# Patient Record
Sex: FEMALE | Race: WHITE | Employment: FULL TIME | URBAN - METROPOLITAN AREA
[De-identification: names, ages, dates, MRNs, and addresses within clinical notes are randomized per-mention and may not be internally consistent; named-entity substitution may affect disease eponyms.]

---

## 2017-10-02 RX ORDER — BUPROPION HYDROCHLORIDE 300 MG/1
TABLET ORAL
Qty: 90 TAB | Refills: 3 | Status: SHIPPED | OUTPATIENT
Start: 2017-10-02 | End: 2018-11-15 | Stop reason: SDUPTHER

## 2017-10-02 RX ORDER — SPIRONOLACTONE 50 MG/1
TABLET, FILM COATED ORAL
Qty: 90 TAB | Refills: 3 | Status: SHIPPED | OUTPATIENT
Start: 2017-10-02 | End: 2018-11-15 | Stop reason: SDUPTHER

## 2017-10-02 RX ORDER — FAMOTIDINE 40 MG/1
TABLET, FILM COATED ORAL
Qty: 90 TAB | Refills: 3 | Status: SHIPPED | OUTPATIENT
Start: 2017-10-02 | End: 2018-11-15 | Stop reason: SDUPTHER

## 2018-07-24 ENCOUNTER — OFFICE VISIT (OUTPATIENT)
Dept: INTERNAL MEDICINE CLINIC | Age: 48
End: 2018-07-24

## 2018-07-24 VITALS
OXYGEN SATURATION: 97 % | DIASTOLIC BLOOD PRESSURE: 80 MMHG | HEART RATE: 74 BPM | SYSTOLIC BLOOD PRESSURE: 140 MMHG | WEIGHT: 193 LBS | BODY MASS INDEX: 36.44 KG/M2 | HEIGHT: 61 IN

## 2018-07-24 DIAGNOSIS — F32.A DEPRESSION, UNSPECIFIED DEPRESSION TYPE: ICD-10-CM

## 2018-07-24 DIAGNOSIS — I10 ESSENTIAL HYPERTENSION: Primary | ICD-10-CM

## 2018-07-24 PROBLEM — E66.01 SEVERE OBESITY (BMI 35.0-39.9): Status: ACTIVE | Noted: 2018-07-24

## 2018-07-24 RX ORDER — CHOLECALCIFEROL TAB 125 MCG (5000 UNIT) 125 MCG
TAB ORAL DAILY
COMMUNITY
End: 2022-05-26 | Stop reason: SDUPTHER

## 2018-07-24 RX ORDER — LEVOCETIRIZINE DIHYDROCHLORIDE 5 MG/1
TABLET, FILM COATED ORAL
COMMUNITY

## 2018-07-24 NOTE — PROGRESS NOTES
Jaymie Talya presents with   Chief Complaint   Patient presents with    Follow-up     discuss medications   Patient here for an overdue followup & medication evaluation. States she is going through a divorce at this time and is tearful. Depression screening needs to be completed. 1. Have you been to the ER, urgent care clinic since your last visit? Hospitalized since your last visit? No    2. Have you seen or consulted any other health care providers outside of the [address] [place] [address] since your last visit? Include any pap smears or colon screening.  No

## 2018-07-24 NOTE — PATIENT INSTRUCTIONS
Depression and Chronic Disease: Care Instructions  Your Care Instructions    A chronic disease is one that you have for a long time. Some chronic diseases can be controlled, but they usually cannot be cured. Depression is common in people with chronic diseases, but it often goes unnoticed. Many people have concerns about seeking treatment for a mental health problem. You may think it's a sign of weakness, or you don't want people to know about it. It's important to overcome these reasons for not seeking treatment. Treating depression or anxiety is good for your health. Follow-up care is a key part of your treatment and safety. Be sure to make and go to all appointments, and call your doctor if you are having problems. It's also a good idea to know your test results and keep a list of the medicines you take. How can you care for yourself at home? Watch for symptoms of depression  The symptoms of depression are often subtle at first. You may think they are caused by your disease rather than depression. Or you may think it is normal to be depressed when you have a chronic disease. If you are depressed you may:  · Feel sad or hopeless. · Feel guilty or worthless. · Not enjoy the things you used to enjoy. · Feel hopeless, as though life is not worth living. · Have trouble thinking or remembering. · Have low energy, and you may not eat or sleep well. · Pull away from others. · Think often about death or killing yourself. (Keep the numbers for these national suicide hotlines: 8-819-580-TALK [1-811.226.9199] and 1-873-QVDYQGA [1-140.981.7446]. )  Get treatment  By treating your depression, you can feel more hopeful and have more energy. If you feel better, you may take better care of yourself, so your health may improve. · Talk to your doctor if you have any changes in mood during treatment for your disease. · Ask your doctor for help.  Counseling, antidepressant medicine, or a combination of the two can help most people with depression. Often a combination works best. Counseling can also help you cope with having a chronic disease. When should you call for help? Call 911 anytime you think you may need emergency care. For example, call if:    · You feel like hurting yourself or someone else.     · Someone you know has depression and is about to attempt or is attempting suicide.   [facility] your doctor now or seek immediate medical care if:    · You hear voices.     · Someone you know has depression and:  ¨ Starts to give away his or her possessions. ¨ Uses illegal drugs or drinks alcohol heavily. ¨ Talks or writes about death, including writing suicide notes or talking about guns, knives, or pills. ¨ Starts to spend a lot of time alone. ¨ Acts very aggressively or suddenly appears calm.    Watch closely for changes in your health, and be sure to contact your doctor if:    · You do not get better as expected. Where can you learn more? Go to http://anton-esperanza.info/. Enter J230 in the search box to learn more about \"Depression and Chronic Disease: Care Instructions. \"  Current as of: December 7, 2017  Content Version: 11.7  © 2544-6530 [employer]. Care instructions adapted under license by [employer] (which disclaims liability or warranty for this information). If you have questions about a medical condition or this instruction, always ask your healthcare professional. [address] any warranty or liability for your use of this information.

## 2018-07-24 NOTE — MR AVS SNAPSHOT
[address] [facility] 
 
 
 [address] [id number] [phone] Patient: Mustapha Stafford MRN: [id number] [facility]:[birth year] Visit Information Date & Time Provider Department Dept. Phone Encounter #  
 7/24/2018  3:00 PM Manuel Lopez NP 20 [facility] [phone] [id number] Follow-up Instructions Return in about 2 weeks (around 8/7/2018). Your Appointments 8/8/2018  9:30 AM  
Complete Physical with Manuel Lopez NP  
[facility] ([address]) Appt Note: CPE, fasting [address] [address] [address] [address] Upcoming Health Maintenance Date Due DTaP/Tdap/Td series (1 - Tdap) 1/30/1991 PAP AKA CERVICAL CYTOLOGY 1/30/1991 Influenza Age 5 to Adult 8/1/2018 Allergies as of 7/24/2018  Review Complete On: 7/24/2018 By: Manuel Lopez NP Severity Noted Reaction Type Reactions Sulfa Dyne  04/06/2012    Hives, Itching Current Immunizations  Never Reviewed No immunizations on file. Not reviewed this visit Vitals BP Pulse Height(growth percentile) Weight(growth percentile) LMP SpO2  
 140/80 74 5' 1\" (1.549 m) 193 lb (87.5 kg) 11/01/2012 97% BMI OB Status Smoking Status 36.47 kg/m2 Hysterectomy Never Smoker Vitals History BMI and BSA Data Body Mass Index Body Surface Area  
 36.47 kg/m 2 1.94 m 2 Preferred Pharmacy Pharmacy Name Phone Christoph Larios, [facility] [phone] Your Updated Medication List  
  
   
This list is accurate as of 7/24/18  4:05 PM.  Always use your most recent med list.  
  
  
  
  
 buPROPion  mg XL tablet Commonly known as:  WELLBUTRIN XL  
TAKE 1 TABLET DAILY calcium citrate-vitamin d3 315-200 mg-unit Tab Commonly known as:  CITRACAL+D Take 1 Tab by mouth daily (with breakfast). CENTRUM 0.4-162-18 mg Tab Generic drug:  OY-DG-RG-Fe-Min-Lycopen-Lutein Take  by mouth daily. cetirizine 10 mg tablet Commonly known as:  ZYRTEC Take 10 mg by mouth nightly as needed. cholecalciferol (VITAMIN D3) 5,000 unit Tab tablet Commonly known as:  VITAMIN D3 Take  by mouth daily. famotidine 40 mg tablet Commonly known as:  PEPCID  
TAKE 1 TABLET DAILY  
  
 lisinopril 5 mg tablet Commonly known as:  Kristy Vincenzo Take 2.5 mg by mouth daily. NASONEX 50 mcg/actuation nasal spray Generic drug:  mometasone 2 Sprays by Both Nostrils route daily. spironolactone 50 mg tablet Commonly known as:  ALDACTONE  
TAKE 1 TABLET DAILY XYZAL 5 mg tablet Generic drug:  levocetirizine Take  by mouth. Follow-up Instructions Return in about 2 weeks (around 8/7/2018). Introducing [facility] HEALTH SERVICES! Dear Jonathan Duke: Thank you for requesting a Zhanzuo account. Our records indicate that you already have an active Zhanzuo account. You can access your account anytime at [email or web address] Did you know that you can access your hospital and ER discharge instructions at any time in Zhanzuo? You can also review all of your test results from your hospital stay or ER visit. Additional Information If you have questions, please visit the Frequently Asked Questions section of the Zhanzuo website at [email or web address] Remember, Zhanzuo is NOT to be used for urgent needs. For medical emergencies, dial 911. Now available from your iPhone and Android! Please provide this summary of care documentation to your next provider. Your primary care clinician is listed as Felix Ivory. If you have any questions after today's visit, please call [phone].

## 2018-07-24 NOTE — PROGRESS NOTES
Subjective:  Ms. Stephanie Ojeda is a 50year old lady who comes in today to discuss her ongoing depression. She tells me that she is currently going through a divorce and in the past week it has become a little more difficult. She is finding herself being much more tearful. She denies any suicidal thoughts. In the past she has been managed effectively on Wellbutrin  mg daily. She is feeling less energetic. She does have a friend that has been very supportive and she tells me that her family is also very supportive. Despite all of this she is having difficulty coping. Past Medical History:   Diagnosis Date    Asthma     SYMPTOM-FREE SINCE 2007    Chronic pain     lower back    Depression     TYPE A DEPRESSION    GERD (gastroesophageal reflux disease)     Hypertension     Other ill-defined conditions(799.89)     VITILIGO HANDS,FACE, JOINTS    Psychiatric disorder     DEPRESSION     Past Surgical History:   Procedure Laterality Date    HX CHOLECYSTECTOMY      HX GYN  7/09    NOVASURE ABLATION    HX GYN      D & C    HX HYSTERECTOMY      2012    HX TONSILLECTOMY  AGE 7       Current Outpatient Prescriptions on File Prior to Visit   Medication Sig Dispense Refill    spironolactone (ALDACTONE) 50 mg tablet TAKE 1 TABLET DAILY 90 Tab 3    buPROPion XL (WELLBUTRIN XL) 300 mg XL tablet TAKE 1 TABLET DAILY 90 Tab 3    famotidine (PEPCID) 40 mg tablet TAKE 1 TABLET DAILY 90 Tab 3    JG-LZ-VJ-Fe-Min-Lycopen-Lutein (CENTRUM) 0.4-162-18 mg Tab Take  by mouth daily.  lisinopril (PRINIVIL, ZESTRIL) 5 mg tablet Take 2.5 mg by mouth daily.  cetirizine (ZYRTEC) 10 mg tablet Take 10 mg by mouth nightly as needed.  mometasone (NASONEX) 50 mcg/actuation nasal spray 2 Sprays by Both Nostrils route daily.  calcium citrate-vitamin d3 (CITRACAL+D) 315-200 mg-unit Tab Take 1 Tab by mouth daily (with breakfast). No current facility-administered medications on file prior to visit. Allergies   Allergen Reactions    Sulfa Dyne Hives and Itching     Physical Examination:  GENERAL:  Pleasant lady in no acute distress. She is alert and oriented. VITALS:  BP: initially 155/97, repeated by myself 140/80. P: 74.  O2 sat: 97%. HEENT:  Normocephalic, atraumatic. NECK:  Supple without adenopathy. CHEST:  Lungs clear to auscultation, no rales or wheezes. CV:  Heart regular rhythm without murmur. EXTREMITIES:  No edema or calf tenderness. Distal pulses were present. Impression:  1. Hypertension. 2. Depression. 3. Allergic rhinitis. Plan:  1. I have opted to refer her to Joanie Vanegas, psychiatric nurse practitioner. She is in agreement. 2. She will come back in two weeks for blood pressure check, at which time we will do her H&P, as well as lab studies since she we have not seen her for almost two years. She is in agreement.

## 2018-08-08 ENCOUNTER — OFFICE VISIT (OUTPATIENT)
Dept: INTERNAL MEDICINE CLINIC | Age: 48
End: 2018-08-08

## 2018-08-08 VITALS
BODY MASS INDEX: 36.63 KG/M2 | SYSTOLIC BLOOD PRESSURE: 130 MMHG | HEIGHT: 61 IN | OXYGEN SATURATION: 96 % | DIASTOLIC BLOOD PRESSURE: 82 MMHG | HEART RATE: 65 BPM | WEIGHT: 194 LBS

## 2018-08-08 DIAGNOSIS — F32.A DEPRESSION, UNSPECIFIED DEPRESSION TYPE: ICD-10-CM

## 2018-08-08 DIAGNOSIS — E78.5 HYPERLIPIDEMIA, UNSPECIFIED HYPERLIPIDEMIA TYPE: ICD-10-CM

## 2018-08-08 DIAGNOSIS — Z13.29 SCREENING FOR THYROID DISORDER: ICD-10-CM

## 2018-08-08 DIAGNOSIS — E55.9 VITAMIN D DEFICIENCY: ICD-10-CM

## 2018-08-08 DIAGNOSIS — Z13.1 SCREENING FOR DIABETES MELLITUS: ICD-10-CM

## 2018-08-08 DIAGNOSIS — I10 ESSENTIAL HYPERTENSION: Primary | ICD-10-CM

## 2018-08-08 DIAGNOSIS — J30.9 ALLERGIC RHINITIS, UNSPECIFIED SEASONALITY, UNSPECIFIED TRIGGER: ICD-10-CM

## 2018-08-08 PROBLEM — F32.1 MODERATE MAJOR DEPRESSION (HCC): Status: ACTIVE | Noted: 2018-08-08

## 2018-08-08 LAB
BACTERIA UA POCT, BACTPOCT: NORMAL
BILIRUB UR QL STRIP: NEGATIVE
CASTS UA POCT: 0
CLUE CELLS, CLUEPOCT: NEGATIVE
CRYSTALS UA POCT, CRYSPOCT: NEGATIVE
EPITHELIAL CELLS POCT: NEGATIVE
GLUCOSE UR-MCNC: NEGATIVE MG/DL
GRAN# POC: 5.6 K/UL (ref 2–7.8)
GRAN% POC: 74.5 % (ref 37–92)
HCT VFR BLD CALC: 43.4 % (ref 37–51)
HGB BLD-MCNC: 14.6 G/DL (ref 12–18)
KETONES P FAST UR STRIP-MCNC: NEGATIVE MG/DL
LY# POC: 1.5 K/UL (ref 0.6–4.1)
LY% POC: 22 % (ref 10–58.5)
MCH RBC QN: 31.2 PG (ref 26–32)
MCHC RBC-ENTMCNC: 33.6 G/DL (ref 30–36)
MCV RBC: 93 FL (ref 80–97)
MID #, POC: 0.2 K/UL (ref 0–1.8)
MID% POC: 3.5 % (ref 0.1–24)
MUCUS UA POCT, MUCPOCT: NORMAL
PH UR STRIP: 6 [PH] (ref 5–7)
PLATELET # BLD: 260 K/UL (ref 140–440)
PROT UR QL STRIP: NEGATIVE
RBC # BLD: 4.67 M/UL (ref 4.2–6.3)
RBC UA POCT, RBCPOCT: 0
SP GR UR STRIP: 1.01 (ref 1.01–1.02)
TRICH UA POCT, TRICHPOC: NEGATIVE
UA UROBILINOGEN AMB POC: NORMAL (ref 0.2–1)
URINALYSIS CLARITY POC: CLEAR
URINALYSIS COLOR POC: NORMAL
URINE BLOOD POC: NEGATIVE
URINE CULT COMMENT, POCT: NORMAL
URINE LEUKOCYTES POC: NEGATIVE
URINE NITRITES POC: NEGATIVE
WBC # BLD: 7.3 K/UL (ref 4.1–10.9)
WBC UA POCT, WBCPOCT: 0
YEAST UA POCT, YEASTPOC: NEGATIVE

## 2018-08-08 NOTE — MR AVS SNAPSHOT
[address] [facility] 
 
 
 [address] [address] [phone] Patient: Raj Spivey MRN: [id number] MIGDALIA:[birth year] Visit Information Date & Time Provider Department Dept. Phone Encounter #  
 8/8/2018  9:30 AM [phone] [id number] Follow-up Instructions Return in about 6 months (around 2/8/2019). Your Appointments 2/11/2019  8:30 AM  
Follow Up with NIKO[facility] ([address]) Appt Note: 6 month follow-up [address] [address] [address] . [facility] [address] Upcoming Health Maintenance Date Due DTaP/Tdap/Td series (1 - Tdap) 1/30/1991 PAP AKA CERVICAL CYTOLOGY 1/30/1991 Influenza Age 5 to Adult 8/1/2018 Allergies as of 8/8/2018  Review Complete On: 8/8/2018 By: Sheela Mcknight NP Severity Noted Reaction Type Reactions Sulfa Dyne  04/06/2012    Hives, Itching Current Immunizations  Never Reviewed No immunizations on file. Not reviewed this visit You Were Diagnosed With   
  
 Codes Comments Essential hypertension    -  Primary ICD-10-CM: I10 
ICD-9-CM: 401.9 Depression, unspecified depression type     ICD-10-CM: F32.9 ICD-9-CM: 060 Allergic rhinitis, unspecified seasonality, unspecified trigger     ICD-10-CM: J30.9 ICD-9-CM: 477.9 Vitamin D deficiency     ICD-10-CM: E55.9 ICD-9-CM: 268.9 Hyperlipidemia, unspecified hyperlipidemia type     ICD-10-CM: E78.5 ICD-9-CM: 272.4 Screening for thyroid disorder     ICD-10-CM: Z13.29 ICD-9-CM: V77.0 Screening for diabetes mellitus     ICD-10-CM: Z13.1 ICD-9-CM: V77.1 Vitals BP Pulse Height(growth percentile) Weight(growth percentile) LMP SpO2 130/82 65 5' 1\" (1.549 m) 194 lb (88 kg) 11/01/2012 96% BMI OB Status Smoking Status 36.66 kg/m2 Hysterectomy Never Smoker Vitals History BMI and BSA Data Body Mass Index Body Surface Area  
 36.66 kg/m 2 1.95 m 2 Preferred Pharmacy Pharmacy Name Phone Christoph Larios, [facility] [phone] Your Updated Medication List  
  
   
This list is accurate as of 8/8/18 11:17 AM.  Always use your most recent med list.  
  
  
  
  
 buPROPion  mg XL tablet Commonly known as:  WELLBUTRIN XL  
TAKE 1 TABLET DAILY  
  
 calcium citrate-vitamin d3 315-200 mg-unit Tab Commonly known as:  CITRACAL+D Take 1 Tab by mouth daily (with breakfast). CENTRUM 0.4-162-18 mg Tab Generic drug:  AO-VN-CX-Fe-Min-Lycopen-Lutein Take  by mouth daily. cetirizine 10 mg tablet Commonly known as:  ZYRTEC Take 10 mg by mouth nightly as needed. cholecalciferol (VITAMIN D3) 5,000 unit Tab tablet Commonly known as:  VITAMIN D3 Take  by mouth daily. famotidine 40 mg tablet Commonly known as:  PEPCID  
TAKE 1 TABLET DAILY  
  
 NASONEX 50 mcg/actuation nasal spray Generic drug:  mometasone 2 Sprays by Both Nostrils route daily. spironolactone 50 mg tablet Commonly known as:  ALDACTONE  
TAKE 1 TABLET DAILY TRINTELLIX 5 mg tablet Generic drug:  vortioxetine Take  by mouth daily. XYZAL 5 mg tablet Generic drug:  levocetirizine Take  by mouth. We Performed the Following AMB POC COMPLETE CBC,AUTOMATED ENTER [id number] CPT(R)] AMB POC URINALYSIS DIP STICK AUTO W/ MICRO  [06454 CPT(R)] HEMOGLOBIN A1C WITH EAG [56995 CPT(R)] LIPID PANEL [75946 CPT(R)] METABOLIC PANEL, COMPREHENSIVE [47002 CPT(R)] T4, FREE [id number] CPT(R)] TSH 3RD GENERATION [91574 CPT(R)] VITAMIN D, 25 HYDROXY [id number] CPT(R)] Follow-up Instructions Return in about 6 months (around 2/8/2019). Patient Instructions Seasonal Allergies: Care Instructions Your Care Instructions Allergies occur when your body's defense system (immune system) overreacts to certain substances. The immune system treats a harmless substance as if it were a harmful germ or virus. Many things can cause this to happen. Examples include pollens, medicine, food, dust, animal dander, and mold. Your allergies are seasonal if you have symptoms just at certain times of the year. In that case, you are probably allergic to pollens from certain trees, grasses, or weeds. Allergies can be mild or severe. Over-the-counter allergy medicine may help with some symptoms. Read and follow all instructions on the label. Managing your allergies is an important part of staying healthy. Your doctor may suggest that you have tests to help find the cause of your allergies. When you know what things trigger your symptoms, you can avoid them. This can prevent allergy symptoms and other health problems. In some cases, immunotherapy might help. For this treatment, you get shots or use pills that have a small amount of certain allergens in them. Your body \"gets used to\" the allergen, so you react less to it over time. This kind of treatment may help prevent or reduce some allergy symptoms. Follow-up care is a key part of your treatment and safety. Be sure to make and go to all appointments, and call your doctor if you are having problems. It's also a good idea to know your test results and keep a list of the medicines you take. How can you care for yourself at home? · Be safe with medicines. Take your medicines exactly as prescribed. Call your doctor if you think you are having a problem with your medicine. · During your allergy season, keep windows closed. If you need to use air-conditioning, change or clean all filters every month.  Take a shower and change your clothes after you have been outside. · Stay inside when pollen counts are high. Vacuum once or twice a week. Use a vacuum  with a HEPA filter or a double-thickness filter. When should you call for help? Give an epinephrine shot if: 
  · You think you are having a severe allergic reaction.  
 After giving an epinephrine shot, call 911, even if you feel better. 
 Call 911 if: 
  · You have symptoms of a severe allergic reaction. These may include: 
¨ Sudden raised, red areas (hives) all over your body. ¨ Swelling of the throat, mouth, lips, or tongue. ¨ Trouble breathing. ¨ Passing out (losing consciousness). Or you may feel very lightheaded or suddenly feel weak, confused, or restless.  
  · You have been given an epinephrine shot, even if you feel better.  
 Call your doctor now or seek immediate medical care if: 
  · You have symptoms of an allergic reaction, such as: ¨ A rash or hives (raised, red areas on the skin). ¨ Itching. ¨ Swelling. ¨ Belly pain, nausea, or vomiting.  
 Watch closely for changes in your health, and be sure to contact your doctor if: 
  · You do not get better as expected. Where can you learn more? Go to http://anton-esperanza.info/. Enter J912 in the search box to learn more about \"Seasonal Allergies: Care Instructions. \" Current as of: October 6, 2017 Content Version: 11.7 © 8733-7130 [employer]. Care instructions adapted under license by [employer] (which disclaims liability or warranty for this information). If you have questions about a medical condition or this instruction, always ask your healthcare professional. [address] any warranty or liability for your use of this information. Introducing [facility]! Dear Kacie Wilburn: Thank you for requesting a LaunchTrack account. Our records indicate that you already have an active LaunchTrack account.   You can access your account anytime at [email or web address] Did you know that you can access your hospital and ER discharge instructions at any time in CloudCover? You can also review all of your test results from your hospital stay or ER visit. Additional Information If you have questions, please visit the Frequently Asked Questions section of the CloudCover website at [email or web address] Remember, CloudCover is NOT to be used for urgent needs. For medical emergencies, dial 911. Now available from your iPhone and Android! Please provide this summary of care documentation to your next provider. Your primary care clinician is listed as Debby Dias. If you have any questions after today's visit, please call [phone].

## 2018-08-08 NOTE — PROGRESS NOTES
Tresa Grecia presents with   Chief Complaint   Patient presents with    Complete Physical   Patient here for a complete physical.  She is fasting. ALLERGY to Sulfa Dyne. She saw Branden Mcgraw on Monday & was started on Trintellix 5mg. Has followup with her in 6 weeks. Medication reviewed & updated. Flowsheets & Questionnaires reviewed & updated. Medical, social, family & surgical history reviewed & updated. She is overdue for Mammogram and pap & pelvic at [facility]. Encouraged to make that appointment. 1. Have you been to the ER, urgent care clinic since your last visit? Hospitalized since your last visit? No    2. Have you seen or consulted any other health care providers outside of the [address] [place] [address] since your last visit? Include any pap smears or colon screening.  Yes When: 08/06/2018 Where: Brnaden Mcgraw Reason for visit: anxiety

## 2018-08-08 NOTE — PATIENT INSTRUCTIONS
Seasonal Allergies: Care Instructions  Your Care Instructions  Allergies occur when your body's defense system (immune system) overreacts to certain substances. The immune system treats a harmless substance as if it were a harmful germ or virus. Many things can cause this to happen. Examples include pollens, medicine, food, dust, animal dander, and mold. Your allergies are seasonal if you have symptoms just at certain times of the year. In that case, you are probably allergic to pollens from certain trees, grasses, or weeds. Allergies can be mild or severe. Over-the-counter allergy medicine may help with some symptoms. Read and follow all instructions on the label. Managing your allergies is an important part of staying healthy. Your doctor may suggest that you have tests to help find the cause of your allergies. When you know what things trigger your symptoms, you can avoid them. This can prevent allergy symptoms and other health problems. In some cases, immunotherapy might help. For this treatment, you get shots or use pills that have a small amount of certain allergens in them. Your body \"gets used to\" the allergen, so you react less to it over time. This kind of treatment may help prevent or reduce some allergy symptoms. Follow-up care is a key part of your treatment and safety. Be sure to make and go to all appointments, and call your doctor if you are having problems. It's also a good idea to know your test results and keep a list of the medicines you take. How can you care for yourself at home? · Be safe with medicines. Take your medicines exactly as prescribed. Call your doctor if you think you are having a problem with your medicine. · During your allergy season, keep windows closed. If you need to use air-conditioning, change or clean all filters every month. Take a shower and change your clothes after you have been outside. · Stay inside when pollen counts are high.  Vacuum once or twice a week. Use a vacuum  with a HEPA filter or a double-thickness filter. When should you call for help? Give an epinephrine shot if:    · You think you are having a severe allergic reaction.    After giving an epinephrine shot, call 911, even if you feel better.   Call 911 if:    · You have symptoms of a severe allergic reaction. These may include:  ¨ Sudden raised, red areas (hives) all over your body. ¨ Swelling of the throat, mouth, lips, or tongue. ¨ Trouble breathing. ¨ Passing out (losing consciousness). Or you may feel very lightheaded or suddenly feel weak, confused, or restless.     · You have been given an epinephrine shot, even if you feel better.    Call your doctor now or seek immediate medical care if:    · You have symptoms of an allergic reaction, such as:  ¨ A rash or hives (raised, red areas on the skin). ¨ Itching. ¨ Swelling. ¨ Belly pain, nausea, or vomiting.    Watch closely for changes in your health, and be sure to contact your doctor if:    · You do not get better as expected. Where can you learn more? Go to http://anton-esperanza.info/. Enter J912 in the search box to learn more about \"Seasonal Allergies: Care Instructions. \"  Current as of: October 6, 2017  Content Version: 11.7  © 0618-1823 [employer]. Care instructions adapted under license by [employer] (which disclaims liability or warranty for this information). If you have questions about a medical condition or this instruction, always ask your healthcare professional. [place] [address] any warranty or liability for your use of this information.

## 2018-08-08 NOTE — PROGRESS NOTES
Subjective:  Ms. Natali Mederos is a pleasant 50year old lady who comes in today for a complete H&P. History of Present Illness:  I saw Ms. Natali Mederos two weeks ago in regards to her ongoing depression. She at that time was having some difficulty as she was going through a divorce. I did refer her to Ankit Felton, psychiatric nurse practitioner, and she started her on Trintellix 5 mg daily. She is also on Wellbutrin  mg daily. She tells me that meeting with Sean was very helpful. She is feeling much better. Past Medical History:   Diagnosis Date    Asthma     SYMPTOM-FREE SINCE 2007    Chronic pain     lower back    Depression     TYPE A DEPRESSION    GERD (gastroesophageal reflux disease)     Hypertension     Other ill-defined conditions(519.21)     VITILIGO HANDS,FACE, JOINTS    Psychiatric disorder     DEPRESSION     Past Surgical History:   Procedure Laterality Date    HX CHOLECYSTECTOMY      HX GYN  7/09    NOVASURE ABLATION    HX GYN      D & C    HX HYSTERECTOMY      2012    HX TONSILLECTOMY  AGE 7       Current Outpatient Prescriptions on File Prior to Visit   Medication Sig Dispense Refill    cholecalciferol, VITAMIN D3, (VITAMIN D3) 5,000 unit tab tablet Take  by mouth daily.  levocetirizine (XYZAL) 5 mg tablet Take  by mouth.  spironolactone (ALDACTONE) 50 mg tablet TAKE 1 TABLET DAILY 90 Tab 3    buPROPion XL (WELLBUTRIN XL) 300 mg XL tablet TAKE 1 TABLET DAILY 90 Tab 3    famotidine (PEPCID) 40 mg tablet TAKE 1 TABLET DAILY 90 Tab 3    HW-NZ-AK-Fe-Min-Lycopen-Lutein (CENTRUM) 0.4-162-18 mg Tab Take  by mouth daily.  calcium citrate-vitamin d3 (CITRACAL+D) 315-200 mg-unit Tab Take 1 Tab by mouth daily (with breakfast).  cetirizine (ZYRTEC) 10 mg tablet Take 10 mg by mouth nightly as needed.  mometasone (NASONEX) 50 mcg/actuation nasal spray 2 Sprays by Both Nostrils route daily.         lisinopril (PRINIVIL, ZESTRIL) 5 mg tablet Take 2.5 mg by mouth daily. No current facility-administered medications on file prior to visit. Allergies   Allergen Reactions    Sulfa Dyne Hives and Itching     Past Medical History/Surgeries:    1. Partial hysterectomy. 2. Cholecystectomy. 3. T&A. Illnesses:  1. Hypertension, managed on Spironolactone 50 mg daily. 2. Chronic depression as noted previously. 3. GERD. 4. Vitamin D deficiency. 5. Allergic rhinitis. 6. History of Lyme disease in 2013 with resulting intermittent arthritis. Family History:  Father is 70years of age, alive with hypertension. He had a sarcoma many years ago, but has done well from that standpoint. Mother is 70years of age, alive with hypertension, diabetes and heart disease. Two siblings are living and well. Social History:  She is . She lives alone. She has two children. She works as an  for [employer] [place]. Allergies:  Sulfa drug, which causes a rash. Medications:  1. Trintellix 5 mg daily. 2. Vitamin D3 5,000 units daily. 3. Xyzal prn  4. Spironolactone 50 mg daily. 5. Wellbutrin  mg daily. 6. Pepcid 40 mg daily. 7. Multivitamin daily. 8. Citracal plus D 315/200 daily. 9. Zyrtec 10 mg daily as needed. 10. Nasonex nasal spray, two sprays both nostrils daily. Habits:  Nonsmoker and non drinker. Review of Systems:  HEENT:  Denies any headaches, dizziness or blurred vision. She does wear contact lenses. She does have an upcoming eye appointment. CVR:  Denies any chest pain or palpitations. Denies any shortness of breath, cough, wheezing, PND or orthopnea. She notes occasional ankle edema. GI:  Appetite is good, weight is stable. Does have a normal bowel pattern without presence of blood in stools or melena. :  Denies any urinary symptoms. Nocturia x one. GYN:  She is in need of getting a pelvic and pap, as well as mammogram.     Physical Examination:  GENERAL:  Pleasant lady in no acute distress.   She is alert and oriented. VITALS:  BP: initially 141/92, repeated by myself 130/82. P: 65.  O2 sat: 96.  WT: 194 lbs. HT: 5'1\". HEENT:  Normocephalic, atraumatic. PERRLA, EOMI. TMs normal.  Mouth mucosa pink. Tongue midline. Pharynx normal.  NECK:  Supple without adenopathy, thyromegaly or carotid bruits. CHEST:  Lungs clear to ausculation, no rales or wheezes. CV:  Heart regular rhythm without murmur or gallop. BREASTS:  Symmetrical without mass or nipple discharge. There is no axillary, infra or supraclavicular adenopathy noted. ABDOMEN:  Bowel sounds present. Soft, non tender, no organomegaly or masses. EXTREMITIES:  No edema or calf tenderness. Distal pulses were present and symmetrical.  NEUROLOGIC:  Cranial nerves II-XII intact. Excellent strength in the upper and lower extremities against resistance. Reflexes 2+ and symmetrical.  Sensation is preserved. Romberg is negative. She had excellent coordination. SKIN:  She has vitiligo on hands, face, as well as trunk. Impression:  1. Hypertension. 2. GERD. 3. Chronic depression. 4. Allergic rhinitis. 5. Vitamin D deficiency. 6. Vitiligo. Plan:  1. She was fasting this morning so therefore it was opted to get all of her lab results. I will call her as soon as I have the results. 2. [facility] for her pelvic, pap and mammogram.  3. She did have a TDAP through [facility], so therefore will obtain her results. 4. Otherwise I will continue to see her every six months.

## 2018-08-09 LAB
25(OH)D3+25(OH)D2 SERPL-MCNC: 59 NG/ML (ref 30–100)
ALBUMIN SERPL-MCNC: 4.9 G/DL (ref 3.5–5.5)
ALBUMIN/GLOB SERPL: 2.5 {RATIO} (ref 1.2–2.2)
ALP SERPL-CCNC: 120 IU/L (ref 39–117)
ALT SERPL-CCNC: 17 IU/L (ref 0–32)
AST SERPL-CCNC: 17 IU/L (ref 0–40)
BILIRUB SERPL-MCNC: 0.7 MG/DL (ref 0–1.2)
BUN SERPL-MCNC: 14 MG/DL (ref 6–24)
BUN/CREAT SERPL: 14 (ref 9–23)
CALCIUM SERPL-MCNC: 9.7 MG/DL (ref 8.7–10.2)
CHLORIDE SERPL-SCNC: 105 MMOL/L (ref 96–106)
CHOLEST SERPL-MCNC: 178 MG/DL (ref 100–199)
CO2 SERPL-SCNC: 23 MMOL/L (ref 20–29)
CREAT SERPL-MCNC: 1.03 MG/DL (ref 0.57–1)
EST. AVERAGE GLUCOSE BLD GHB EST-MCNC: 97 MG/DL
GLOBULIN SER CALC-MCNC: 2 G/DL (ref 1.5–4.5)
GLUCOSE SERPL-MCNC: 84 MG/DL (ref 65–99)
HBA1C MFR BLD: 5 % (ref 4.8–5.6)
HDLC SERPL-MCNC: 77 MG/DL
LDLC SERPL CALC-MCNC: 88 MG/DL (ref 0–99)
POTASSIUM SERPL-SCNC: 4.7 MMOL/L (ref 3.5–5.2)
PROT SERPL-MCNC: 6.9 G/DL (ref 6–8.5)
SODIUM SERPL-SCNC: 143 MMOL/L (ref 134–144)
T4 FREE SERPL-MCNC: 1.11 NG/DL (ref 0.82–1.77)
TRIGL SERPL-MCNC: 65 MG/DL (ref 0–149)
TSH SERPL DL<=0.005 MIU/L-ACNC: 1.16 UIU/ML (ref 0.45–4.5)
VLDLC SERPL CALC-MCNC: 13 MG/DL (ref 5–40)

## 2018-11-15 RX ORDER — BUPROPION HYDROCHLORIDE 300 MG/1
300 TABLET ORAL DAILY
Qty: 90 TAB | Refills: 3 | Status: SHIPPED | OUTPATIENT
Start: 2018-11-15 | End: 2019-11-21 | Stop reason: SDUPTHER

## 2018-11-15 RX ORDER — SPIRONOLACTONE 50 MG/1
50 TABLET, FILM COATED ORAL DAILY
Qty: 90 TAB | Refills: 3 | Status: SHIPPED | OUTPATIENT
Start: 2018-11-15 | End: 2019-11-21 | Stop reason: SDUPTHER

## 2018-11-15 RX ORDER — FAMOTIDINE 40 MG/1
40 TABLET, FILM COATED ORAL DAILY
Qty: 90 TAB | Refills: 3 | Status: SHIPPED | OUTPATIENT
Start: 2018-11-15 | End: 2019-11-21 | Stop reason: SDUPTHER

## 2019-02-11 ENCOUNTER — OFFICE VISIT (OUTPATIENT)
Dept: INTERNAL MEDICINE CLINIC | Age: 49
End: 2019-02-11

## 2019-02-11 VITALS
OXYGEN SATURATION: 98 % | HEART RATE: 73 BPM | SYSTOLIC BLOOD PRESSURE: 123 MMHG | DIASTOLIC BLOOD PRESSURE: 81 MMHG | HEIGHT: 61 IN | BODY MASS INDEX: 40.44 KG/M2 | WEIGHT: 214.2 LBS | TEMPERATURE: 98 F

## 2019-02-11 DIAGNOSIS — F32.1 MODERATE MAJOR DEPRESSION (HCC): ICD-10-CM

## 2019-02-11 DIAGNOSIS — I10 ESSENTIAL HYPERTENSION: Primary | ICD-10-CM

## 2019-02-11 DIAGNOSIS — E66.01 SEVERE OBESITY WITH BODY MASS INDEX (BMI) OF 35.0 TO 39.9 WITH SERIOUS COMORBIDITY (HCC): ICD-10-CM

## 2019-02-11 RX ORDER — LISINOPRIL 2.5 MG/1
2.5 TABLET ORAL DAILY
Qty: 30 TAB | Refills: 2 | Status: SHIPPED | OUTPATIENT
Start: 2019-02-11 | End: 2019-04-07 | Stop reason: SDUPTHER

## 2019-02-11 NOTE — PROGRESS NOTES
Felicia Carrillo presents with Chief Complaint Patient presents with  Hypertension 6 month followup, fasting Patient here for a six month followup and fasting labs. Medications reviewed & updated. States her home BP readings have been trending high. 1. Have you been to the ER, urgent care clinic since your last visit? Hospitalized since your last visit? No 
 
2. Have you seen or consulted any other health care providers outside of the [address] [place] [address] since your last visit? Include any pap smears or colon screening.  No

## 2019-02-11 NOTE — PROGRESS NOTES
Subjective:  Ms. Nida Smith is a pleasant 52year old lady who comes in today concerned because in the last two months or so her blood pressure has persistently been in the upper 224W systolic and upper 01V diastolic. She was seen at her dentist a few days ago and her blood pressure was 150/90. In the past she had been managed effectively on Spironolactone 50 mg daily. She denies any headaches, dizziness or blurred vision. She denies chest pain or palpitations. She denies shortness of breath, cough, wheezing, PND or orthopnea. Denies any ankle edema. She has gained some weight in the last few months. Past Medical History:  
Diagnosis Date  Asthma SYMPTOM-FREE SINCE 2007  Chronic pain   
 lower back  Depression TYPE A DEPRESSION  
 GERD (gastroesophageal reflux disease)  Hypertension  Other ill-defined conditions(799.89) VITILIGO HANDS,FACE, JOINTS  Psychiatric disorder DEPRESSION Past Surgical History:  
Procedure Laterality Date  HX CHOLECYSTECTOMY  HX GYN  7/09 NOVASURE ABLATION  
 HX GYN    
 D & C  
 HX HYSTERECTOMY    
 2012  HX TONSILLECTOMY  AGE 7 Current Outpatient Medications on File Prior to Visit Medication Sig Dispense Refill  fluticasone propionate (FLONASE NA) by Nasal route daily.  buPROPion XL (WELLBUTRIN XL) 300 mg XL tablet Take 1 Tab by mouth daily. 90 Tab 3  
 famotidine (PEPCID) 40 mg tablet Take 1 Tab by mouth daily. 90 Tab 3  
 spironolactone (ALDACTONE) 50 mg tablet Take 1 Tab by mouth daily. 90 Tab 3  
 vortioxetine (TRINTELLIX) 5 mg tablet Take  by mouth daily.  cholecalciferol, VITAMIN D3, (VITAMIN D3) 5,000 unit tab tablet Take  by mouth daily.  levocetirizine (XYZAL) 5 mg tablet Take  by mouth.  OS-QH-WO-Fe-Min-Lycopen-Lutein (CENTRUM) 0.4-162-18 mg Tab Take  by mouth daily.  cetirizine (ZYRTEC) 10 mg tablet Take 10 mg by mouth nightly as needed.  mometasone (NASONEX) 50 mcg/actuation nasal spray 2 Sprays by Both Nostrils route daily.  calcium citrate-vitamin d3 (CITRACAL+D) 315-200 mg-unit Tab Take 1 Tab by mouth daily (with breakfast). No current facility-administered medications on file prior to visit. Allergies Allergen Reactions  Sulfa Dyne Hives and Itching Physical Examination: 
GENERAL:  Pleasant, obese lady in no acute distress. She is alert and oriented. She answers my questions appropriately. VITALS:  BP: initially 123/81. P: 73.  O2 sat: 98%. T: 98.  BP repeated by myself with a large cuff is 140/90. NECK:  Supple without adenopathy, thyromegaly or carotid bruits. CHEST:  Lungs clear to auscultation, no rales or wheezes. CV:  Heart regular rhythm without murmur. EXTREMITIES:  No edema or calf tenderness. Distal pulses were present. Impression: 1. Hypertension. Plan: 1. It was opted to start Lisinopril 2.5 mg daily. She has taken it in the past without any difficulty. She was taken off Lisinopril after she lost 40 lbs. Unfortunately in this past year she has been under a tremendous amount of stress, undergoing a divorce and this has not helped her diet. 2. I do want to see her back in two weeks. 3. While in the office today I did check a basic metabolic and we will call her as soon as I have the results. 4. We also discussed the importance of a prudent diet, exercise and weight loss to keep her BMI within an acceptable level.

## 2019-02-11 NOTE — PATIENT INSTRUCTIONS
Body Mass Index: Care Instructions Your Care Instructions Body mass index (BMI) can help you see if your weight is raising your risk for health problems. It uses a formula to compare how much you weigh with how tall you are. · A BMI lower than 18.5 is considered underweight. · A BMI between 18.5 and 24.9 is considered healthy. · A BMI between 25 and 29.9 is considered overweight. A BMI of 30 or higher is considered obese. If your BMI is in the normal range, it means that you have a lower risk for weight-related health problems. If your BMI is in the overweight or obese range, you may be at increased risk for weight-related health problems, such as high blood pressure, heart disease, stroke, arthritis or joint pain, and diabetes. If your BMI is in the underweight range, you may be at increased risk for health problems such as fatigue, lower protection (immunity) against illness, muscle loss, bone loss, hair loss, and hormone problems. BMI is just one measure of your risk for weight-related health problems. You may be at higher risk for health problems if you are not active, you eat an unhealthy diet, or you drink too much alcohol or use tobacco products. Follow-up care is a key part of your treatment and safety. Be sure to make and go to all appointments, and call your doctor if you are having problems. It's also a good idea to know your test results and keep a list of the medicines you take. How can you care for yourself at home? · Practice healthy eating habits. This includes eating plenty of fruits, vegetables, whole grains, lean protein, and low-fat dairy. · If your doctor recommends it, get more exercise. Walking is a good choice. Bit by bit, increase the amount you walk every day. Try for at least 30 minutes on most days of the week. · Do not smoke. Smoking can increase your risk for health problems.  If you need help quitting, talk to your doctor about stop-smoking programs and medicines. These can increase your chances of quitting for good. · Limit alcohol to 2 drinks a day for men and 1 drink a day for women. Too much alcohol can cause health problems. If you have a BMI higher than 25 · Your doctor may do other tests to check your risk for weight-related health problems. This may include measuring the distance around your waist. A waist measurement of more than 40 inches in men or 35 inches in women can increase the risk of weight-related health problems. · Talk with your doctor about steps you can take to stay healthy or improve your health. You may need to make lifestyle changes to lose weight and stay healthy, such as changing your diet and getting regular exercise. If you have a BMI lower than 18.5 · Your doctor may do other tests to check your risk for health problems. · Talk with your doctor about steps you can take to stay healthy or improve your health. You may need to make lifestyle changes to gain or maintain weight and stay healthy, such as getting more healthy foods in your diet and doing exercises to build muscle. Where can you learn more? Go to http://anton-esperanza.info/. Enter S176 in the search box to learn more about \"Body Mass Index: Care Instructions. \" Current as of: June 25, 2018 Content Version: 11.9 © 8634-1168 [employer]. Care instructions adapted under license by [employer] (which disclaims liability or warranty for this information). If you have questions about a medical condition or this instruction, always ask your healthcare professional. [address] any warranty or liability for your use of this information.

## 2019-02-12 LAB
BUN SERPL-MCNC: 15 MG/DL (ref 6–24)
BUN/CREAT SERPL: 16 (ref 9–23)
CALCIUM SERPL-MCNC: 9.2 MG/DL (ref 8.7–10.2)
CHLORIDE SERPL-SCNC: 105 MMOL/L (ref 96–106)
CO2 SERPL-SCNC: 20 MMOL/L (ref 20–29)
CREAT SERPL-MCNC: 0.95 MG/DL (ref 0.57–1)
GLUCOSE SERPL-MCNC: 86 MG/DL (ref 65–99)
POTASSIUM SERPL-SCNC: 4.2 MMOL/L (ref 3.5–5.2)
SODIUM SERPL-SCNC: 140 MMOL/L (ref 134–144)

## 2019-04-07 DIAGNOSIS — I10 ESSENTIAL HYPERTENSION: ICD-10-CM

## 2019-04-08 RX ORDER — LISINOPRIL 2.5 MG/1
TABLET ORAL
Qty: 30 TAB | Refills: 1 | Status: SHIPPED | OUTPATIENT
Start: 2019-04-08 | End: 2019-04-23 | Stop reason: SDUPTHER

## 2019-04-23 DIAGNOSIS — I10 ESSENTIAL HYPERTENSION: ICD-10-CM

## 2019-04-23 RX ORDER — LISINOPRIL 2.5 MG/1
2.5 TABLET ORAL DAILY
Qty: 90 TAB | Refills: 3 | Status: SHIPPED | OUTPATIENT
Start: 2019-04-23 | End: 2020-04-10

## 2019-07-25 ENCOUNTER — HOSPITAL ENCOUNTER (OUTPATIENT)
Dept: GENERAL RADIOLOGY | Age: 49
Discharge: HOME | End: 2019-07-25
Attending: PAIN MEDICINE
Payer: COMMERCIAL

## 2019-07-25 DIAGNOSIS — M47.817 LUMBOSACRAL SPONDYLOSIS WITHOUT MYELOPATHY: ICD-10-CM

## 2019-07-25 DIAGNOSIS — M62.830 BACK MUSCLE SPASM: ICD-10-CM

## 2019-07-25 DIAGNOSIS — M47.816 LUMBAR SPONDYLOSIS: ICD-10-CM

## 2019-07-25 PROCEDURE — 72114 X-RAY EXAM L-S SPINE BENDING: CPT

## 2019-10-16 ENCOUNTER — OFFICE VISIT (OUTPATIENT)
Dept: INTERNAL MEDICINE CLINIC | Age: 49
End: 2019-10-16

## 2019-10-16 VITALS
BODY MASS INDEX: 38.48 KG/M2 | TEMPERATURE: 98.5 F | DIASTOLIC BLOOD PRESSURE: 82 MMHG | HEIGHT: 61 IN | SYSTOLIC BLOOD PRESSURE: 140 MMHG | OXYGEN SATURATION: 97 % | HEART RATE: 82 BPM | WEIGHT: 203.8 LBS | RESPIRATION RATE: 16 BRPM

## 2019-10-16 DIAGNOSIS — R05.9 COUGH: Primary | ICD-10-CM

## 2019-10-16 DIAGNOSIS — J20.9 ACUTE BRONCHITIS, UNSPECIFIED ORGANISM: ICD-10-CM

## 2019-10-16 RX ORDER — PREDNISONE 10 MG/1
TABLET ORAL
Qty: 21 TAB | Refills: 0 | Status: SHIPPED | OUTPATIENT
Start: 2019-10-16 | End: 2020-01-06

## 2019-10-16 RX ORDER — IBUPROFEN AND FAMOTIDINE 26.6; 8 MG/1; MG/1
TABLET, FILM COATED ORAL
COMMUNITY
End: 2020-01-06

## 2019-10-16 RX ORDER — METHOCARBAMOL 500 MG/1
500 TABLET, FILM COATED ORAL DAILY
COMMUNITY
Start: 2019-09-26 | End: 2022-05-26

## 2019-10-16 RX ORDER — ALBUTEROL SULFATE 90 UG/1
2 AEROSOL, METERED RESPIRATORY (INHALATION)
Qty: 1 INHALER | Refills: 6 | Status: SHIPPED | OUTPATIENT
Start: 2019-10-16 | End: 2020-05-13

## 2019-10-16 RX ORDER — AZITHROMYCIN 250 MG/1
TABLET, FILM COATED ORAL
Qty: 6 TAB | Refills: 0 | Status: SHIPPED | OUTPATIENT
Start: 2019-10-16 | End: 2020-01-06

## 2019-10-16 RX ORDER — HYDROCODONE POLISTIREX AND CHLORPHENIRAMINE POLISTIREX 10; 8 MG/5ML; MG/5ML
1 SUSPENSION, EXTENDED RELEASE ORAL
Qty: 100 ML | Refills: 0 | Status: SHIPPED | OUTPATIENT
Start: 2019-10-16 | End: 2019-10-26

## 2019-10-16 NOTE — PROGRESS NOTES
Subjective:  Ms. Fer Mariee is a pleasant 52year old lady who comes in today with a one week history of what started out as some nasal congestion and postnasal drainage, has now finally settled down in her chest.  She is now coughing this yellowish sputum. She denies any shortness of breath, but has had some wheezing and chest tight ness. Denies any chills or fever. Denies nausea or vomiting. Past Medical History:   Diagnosis Date    Asthma     SYMPTOM-FREE SINCE 2007    Chronic pain     lower back    Depression     TYPE A DEPRESSION    GERD (gastroesophageal reflux disease)     Hypertension     Other ill-defined conditions(799.89)     VITILIGO HANDS,FACE, JOINTS    Psychiatric disorder     DEPRESSION     Past Surgical History:   Procedure Laterality Date    HX CHOLECYSTECTOMY      HX GYN  7/09    NOVASURE ABLATION    HX GYN      D & C    HX HYSTERECTOMY      2012    HX TONSILLECTOMY  AGE 7       Current Outpatient Medications on File Prior to Visit   Medication Sig Dispense Refill    levocetirizine dihydrochloride (XYZAL PO) Take 5 mg by mouth daily.  methocarbamol (ROBAXIN) 500 mg tablet Take 500 mg by mouth daily.  ibuprofen-famotidine (DUEXIS) 800-26.6 mg tab Take  by mouth.  lisinopril (PRINIVIL, ZESTRIL) 2.5 mg tablet Take 1 Tab by mouth daily. 90 Tab 3    fluticasone propionate (FLONASE NA) by Nasal route daily.  buPROPion XL (WELLBUTRIN XL) 300 mg XL tablet Take 1 Tab by mouth daily. 90 Tab 3    famotidine (PEPCID) 40 mg tablet Take 1 Tab by mouth daily. 90 Tab 3    spironolactone (ALDACTONE) 50 mg tablet Take 1 Tab by mouth daily. 90 Tab 3    cholecalciferol, VITAMIN D3, (VITAMIN D3) 5,000 unit tab tablet Take  by mouth daily.  PO-IH-UL-Fe-Min-Lycopen-Lutein (CENTRUM) 0.4-162-18 mg Tab Take  by mouth daily.  vortioxetine (TRINTELLIX) 5 mg tablet Take  by mouth daily.  levocetirizine (XYZAL) 5 mg tablet Take  by mouth.       cetirizine (ZYRTEC) 10 mg tablet Take 10 mg by mouth nightly as needed.  mometasone (NASONEX) 50 mcg/actuation nasal spray 2 Sprays by Both Nostrils route daily.  calcium citrate-vitamin d3 (CITRACAL+D) 315-200 mg-unit Tab Take 1 Tab by mouth daily (with breakfast). No current facility-administered medications on file prior to visit. Allergies   Allergen Reactions    Sulfa Dyne Hives and Itching       Physical Examination:  GENERAL:  Pleasant lady in no acute distress. She is alert and oriented. She answers my questions appropriately. VITALS:  Blood pressure:  Initially 156/94, repeated by myself 140/82. Pulse:  82.  Respirations:  16.  Temperature:  98.5. O2 sat:  97%. HEENT:  Normocephalic, atraumatic. TMs normal.  Mouth mucosa pink. Tongue midline. Pharynx minimally injected without presence of exudates. No sinus tenderness. NECK:  Supple without adenopathy. CHEST:  Lungs clear to auscultation, no rales or wheezes. Good chest excursion. CV:  Heart regular rhythm without murmur. EXTREMITIES:  No edema or calf tenderness. Distal pulses were present. Impression:  1. Acute bronchitis. Plan:  1. It was opted to start her on a Z-Rogerio along with a steroid pack. 2. She is to increase fluids and rest.  Additionally I did give her a prescription for some Tussionex to use at bedtime for cough. 3. She may use Tylenol alternating with ibuprofen as needed for fever or aches. 4. She certainly will contact us should she fail to improve or if her condition worsens.

## 2019-10-16 NOTE — PATIENT INSTRUCTIONS
Bronchitis: Care Instructions  Your Care Instructions    Bronchitis is inflammation of the bronchial tubes, which carry air to the lungs. The tubes swell and produce mucus, or phlegm. The mucus and inflamed bronchial tubes make you cough. You may have trouble breathing. Most cases of bronchitis are caused by viruses like those that cause colds. Antibiotics usually do not help and they may be harmful. Bronchitis usually develops rapidly and lasts about 2 to 3 weeks in otherwise healthy people. Follow-up care is a key part of your treatment and safety. Be sure to make and go to all appointments, and call your doctor if you are having problems. It's also a good idea to know your test results and keep a list of the medicines you take. How can you care for yourself at home? · Take all medicines exactly as prescribed. Call your doctor if you think you are having a problem with your medicine. · Get some extra rest.  · Take an over-the-counter pain medicine, such as acetaminophen (Tylenol), ibuprofen (Advil, Motrin), or naproxen (Aleve) to reduce fever and relieve body aches. Read and follow all instructions on the label. · Do not take two or more pain medicines at the same time unless the doctor told you to. Many pain medicines have acetaminophen, which is Tylenol. Too much acetaminophen (Tylenol) can be harmful. · Take an over-the-counter cough medicine that contains dextromethorphan to help quiet a dry, hacking cough so that you can sleep. Avoid cough medicines that have more than one active ingredient. Read and follow all instructions on the label. · Breathe moist air from a humidifier, hot shower, or sink filled with hot water. The heat and moisture will thin mucus so you can cough it out. · Do not smoke. Smoking can make bronchitis worse. If you need help quitting, talk to your doctor about stop-smoking programs and medicines. These can increase your chances of quitting for good.   When should you call for help? Call 911 anytime you think you may need emergency care. For example, call if:    · You have severe trouble breathing.    Call your doctor now or seek immediate medical care if:    · You have new or worse trouble breathing.     · You cough up dark brown or bloody mucus (sputum).     · You have a new or higher fever.     · You have a new rash.    Watch closely for changes in your health, and be sure to contact your doctor if:    · You cough more deeply or more often, especially if you notice more mucus or a change in the color of your mucus.     · You are not getting better as expected. Where can you learn more? Go to http://anton-esperanza.info/. Enter H333 in the search box to learn more about \"Bronchitis: Care Instructions. \"  Current as of: June 9, 2019  Content Version: 12.2  © 6371-4566 [employer]. Care instructions adapted under license by [employer] (which disclaims liability or warranty for this information). If you have questions about a medical condition or this instruction, always ask your healthcare professional. [address] any warranty or liability for your use of this information.

## 2019-10-16 NOTE — PROGRESS NOTES
Chief Complaint   Patient presents with    Cough     congestion, feels like its tight in her chest, sweats         1. Have you been to the ER, urgent care clinic since your last visit? Hospitalized since your last visit? no    2. Have you seen or consulted any other health care providers outside of the [address] [place] [address] since your last visit? Include any pap smears or colon screening.  no

## 2019-10-21 RX ORDER — PREDNISONE 10 MG/1
TABLET ORAL
Qty: 18 TAB | Refills: 0 | Status: SHIPPED | OUTPATIENT
Start: 2019-10-21 | End: 2020-01-06

## 2019-10-21 RX ORDER — CEFUROXIME AXETIL 500 MG/1
500 TABLET ORAL 2 TIMES DAILY
Qty: 20 TAB | Refills: 0 | Status: SHIPPED | OUTPATIENT
Start: 2019-10-21 | End: 2020-01-06

## 2019-11-21 RX ORDER — FAMOTIDINE 40 MG/1
40 TABLET, FILM COATED ORAL DAILY
Qty: 90 TAB | Refills: 3 | Status: SHIPPED | OUTPATIENT
Start: 2019-11-21 | End: 2020-12-16

## 2019-11-21 RX ORDER — BUPROPION HYDROCHLORIDE 300 MG/1
300 TABLET ORAL DAILY
Qty: 90 TAB | Refills: 3 | Status: SHIPPED | OUTPATIENT
Start: 2019-11-21 | End: 2020-12-16

## 2019-11-21 RX ORDER — SPIRONOLACTONE 50 MG/1
50 TABLET, FILM COATED ORAL DAILY
Qty: 90 TAB | Refills: 3 | Status: SHIPPED | OUTPATIENT
Start: 2019-11-21 | End: 2020-12-16

## 2019-11-21 NOTE — TELEPHONE ENCOUNTER
PCP: James Swann NP    Last appt: 10/16/2019  No future appointments. Requested Prescriptions     Pending Prescriptions Disp Refills    famotidine (PEPCID) 40 mg tablet 90 Tab 3     Sig: Take 1 Tab by mouth daily.  buPROPion XL (WELLBUTRIN XL) 300 mg XL tablet 90 Tab 3     Sig: Take 1 Tab by mouth daily.  spironolactone (ALDACTONE) 50 mg tablet 90 Tab 3     Sig: Take 1 Tab by mouth daily.        Prior labs and Blood pressures:  BP Readings from Last 3 Encounters:   10/16/19 140/82   02/11/19 123/81   08/08/18 130/82     Lab Results   Component Value Date/Time    Sodium 140 02/11/2019 09:16 AM    Potassium 4.2 02/11/2019 09:16 AM    Chloride 105 02/11/2019 09:16 AM    CO2 20 02/11/2019 09:16 AM    Anion gap 6 11/02/2012 01:55 PM    Glucose 86 02/11/2019 09:16 AM    BUN 15 02/11/2019 09:16 AM    Creatinine 0.95 02/11/2019 09:16 AM    BUN/Creatinine ratio 16 02/11/2019 09:16 AM    GFR est AA 81 02/11/2019 09:16 AM    GFR est non-AA 71 02/11/2019 09:16 AM    Calcium 9.2 02/11/2019 09:16 AM     Lab Results   Component Value Date/Time    Hemoglobin A1c 5.0 08/08/2018 10:50 AM     Lab Results   Component Value Date/Time    Cholesterol, total 178 08/08/2018 10:50 AM    HDL Cholesterol 77 08/08/2018 10:50 AM    LDL, calculated 88 08/08/2018 10:50 AM    VLDL, calculated 13 08/08/2018 10:50 AM    Triglyceride 65 08/08/2018 10:50 AM     Lab Results   Component Value Date/Time    VITAMIN D, 25-HYDROXY 59.0 08/08/2018 10:50 AM       Lab Results   Component Value Date/Time    TSH 1.160 08/08/2018 10:50 AM

## 2020-01-06 ENCOUNTER — OFFICE VISIT (OUTPATIENT)
Dept: URGENT CARE | Age: 50
End: 2020-01-06

## 2020-01-06 VITALS
HEART RATE: 92 BPM | DIASTOLIC BLOOD PRESSURE: 73 MMHG | HEIGHT: 60 IN | TEMPERATURE: 98.6 F | WEIGHT: 207 LBS | BODY MASS INDEX: 40.64 KG/M2 | OXYGEN SATURATION: 98 % | SYSTOLIC BLOOD PRESSURE: 140 MMHG | RESPIRATION RATE: 16 BRPM

## 2020-01-06 DIAGNOSIS — J20.9 ACUTE BRONCHITIS, UNSPECIFIED ORGANISM: Primary | ICD-10-CM

## 2020-01-06 RX ORDER — PREDNISONE 10 MG/1
TABLET ORAL
Qty: 21 TAB | Refills: 0 | Status: SHIPPED | OUTPATIENT
Start: 2020-01-06 | End: 2020-02-13

## 2020-01-06 RX ORDER — AZITHROMYCIN 250 MG/1
TABLET, FILM COATED ORAL
Qty: 6 TAB | Refills: 0 | Status: SHIPPED | OUTPATIENT
Start: 2020-01-06 | End: 2020-02-13

## 2020-01-06 RX ORDER — BENZONATATE 200 MG/1
200 CAPSULE ORAL
Qty: 21 CAP | Refills: 0 | Status: SHIPPED | OUTPATIENT
Start: 2020-01-06 | End: 2020-01-13

## 2020-01-06 NOTE — PROGRESS NOTES
The history is provided by the patient. Cough   The history is provided by the patient. This is a new problem. The current episode started more than 1 week ago. The problem occurs every few minutes. The problem has not changed since onset. The cough is non-productive. There has been no fever. Associated symptoms include sore throat, shortness of breath and wheezing. She has tried cough syrup and decongestants for the symptoms. The treatment provided mild relief. She is not a smoker. Her past medical history is significant for bronchitis and asthma. Her past medical history does not include pneumonia.         Past Medical History:   Diagnosis Date    Asthma     SYMPTOM-FREE SINCE     Chronic pain     lower back    Depression     TYPE A DEPRESSION    GERD (gastroesophageal reflux disease)     Hypertension     Other ill-defined conditions(799.89)     VITILIGO HANDS,FACE, JOINTS    Psychiatric disorder     DEPRESSION        Past Surgical History:   Procedure Laterality Date    HX CHOLECYSTECTOMY      HX GYN      NOVASURE ABLATION    HX GYN      D & C    HX HYSTERECTOMY          HX TONSILLECTOMY  AGE 7         Family History   Problem Relation Age of Onset    Diabetes Mother     Thyroid Disease Mother     Other Mother         MVP    Hypertension Mother     Heart Disease Mother     Cancer Father         MULTIPLE SKIN, SARCOMA    Hypertension Father     Heart Attack Maternal Grandfather     Cancer Paternal Grandfather          OF LUNG CA    Ulcerative Colitis Son         Social History     Socioeconomic History    Marital status:      Spouse name: Not on file    Number of children: Not on file    Years of education: Not on file    Highest education level: Not on file   Occupational History    Not on file   Social Needs    Financial resource strain: Not on file    Food insecurity:     Worry: Not on file     Inability: Not on file    Transportation needs:     Medical: Not on file     Non-medical: Not on file   Tobacco Use    Smoking status: Never Smoker    Smokeless tobacco: Never Used   Substance and Sexual Activity    Alcohol use: Yes     Alcohol/week: 3.0 standard drinks     Types: 3 Shots of liquor per week     Frequency: 2-4 times a month     Drinks per session: 1 or 2     Comment: on saturdays    Drug use: No    Sexual activity: Yes     Partners: Male     Birth control/protection: Surgical   Lifestyle    Physical activity:     Days per week: Not on file     Minutes per session: Not on file    Stress: Not on file   Relationships    Social connections:     Talks on phone: Not on file     Gets together: Not on file     Attends Zoroastrianism service: Not on file     Active member of club or organization: Not on file     Attends meetings of clubs or organizations: Not on file     Relationship status: Not on file    Intimate partner violence:     Fear of current or ex partner: Not on file     Emotionally abused: Not on file     Physically abused: Not on file     Forced sexual activity: Not on file   Other Topics Concern    Not on file   Social History Narrative    Not on file                ALLERGIES: Sulfa dyne    Review of Systems   HENT: Positive for congestion and sore throat. Respiratory: Positive for cough, chest tightness, shortness of breath and wheezing. All other systems reviewed and are negative. Vitals:    01/06/20 1103   BP: 140/73   Pulse: 92   Resp: 16   Temp: 98.6 °F (37 °C)   SpO2: 98%   Weight: 207 lb (93.9 kg)   Height: 5' (1.524 m)       Physical Exam  Vitals signs and nursing note reviewed. Constitutional:       General: She is not in acute distress. HENT:      Right Ear: Tympanic membrane and ear canal normal.      Left Ear: Tympanic membrane and ear canal normal.      Nose: Nose normal.      Mouth/Throat:      Pharynx: No oropharyngeal exudate or posterior oropharyngeal erythema. Eyes:      General:         Right eye: No discharge. Left eye: No discharge. Conjunctiva/sclera: Conjunctivae normal.   Neck:      Musculoskeletal: Neck supple. Pulmonary:      Effort: Pulmonary effort is normal. No respiratory distress. Breath sounds: Decreased air movement present. Decreased breath sounds, wheezing and rhonchi present. No rales. Lymphadenopathy:      Cervical: No cervical adenopathy. Skin:     Findings: No rash. MDM    Procedures      ICD-10-CM ICD-9-CM    1. Acute bronchitis, unspecified organism J20.9 466.0      Medications Ordered Today   Medications    predniSONE (STERAPRED DS) 10 mg dose pack     Sig: As directed     Dispense:  21 Tab     Refill:  0    azithromycin (ZITHROMAX Z-LISA) 250 mg tablet     Sig: As directed     Dispense:  6 Tab     Refill:  0    benzonatate (TESSALON) 200 mg capsule     Sig: Take 1 Cap by mouth three (3) times daily as needed for Cough for up to 7 days. Dispense:  21 Cap     Refill:  0     No results found for any visits on 01/06/20. The patients condition was discussed with the patient and they understand. The patient is to follow up with primary care doctor. If signs and symptoms become worse the pt is to go to the ER. The patient is to take medications as prescribed.

## 2020-01-06 NOTE — PATIENT INSTRUCTIONS
Bronchitis: Care Instructions  Your Care Instructions    Bronchitis is inflammation of the bronchial tubes, which carry air to the lungs. The tubes swell and produce mucus, or phlegm. The mucus and inflamed bronchial tubes make you cough. You may have trouble breathing. Most cases of bronchitis are caused by viruses like those that cause colds. Antibiotics usually do not help and they may be harmful. Bronchitis usually develops rapidly and lasts about 2 to 3 weeks in otherwise healthy people. Follow-up care is a key part of your treatment and safety. Be sure to make and go to all appointments, and call your doctor if you are having problems. It's also a good idea to know your test results and keep a list of the medicines you take. How can you care for yourself at home? · Take all medicines exactly as prescribed. Call your doctor if you think you are having a problem with your medicine. · Get some extra rest.  · Take an over-the-counter pain medicine, such as acetaminophen (Tylenol), ibuprofen (Advil, Motrin), or naproxen (Aleve) to reduce fever and relieve body aches. Read and follow all instructions on the label. · Do not take two or more pain medicines at the same time unless the doctor told you to. Many pain medicines have acetaminophen, which is Tylenol. Too much acetaminophen (Tylenol) can be harmful. · Take an over-the-counter cough medicine that contains dextromethorphan to help quiet a dry, hacking cough so that you can sleep. Avoid cough medicines that have more than one active ingredient. Read and follow all instructions on the label. · Breathe moist air from a humidifier, hot shower, or sink filled with hot water. The heat and moisture will thin mucus so you can cough it out. · Do not smoke. Smoking can make bronchitis worse. If you need help quitting, talk to your doctor about stop-smoking programs and medicines. These can increase your chances of quitting for good.   When should you call for help? Call 911 anytime you think you may need emergency care. For example, call if:    · You have severe trouble breathing.    Call your doctor now or seek immediate medical care if:    · You have new or worse trouble breathing.     · You cough up dark brown or bloody mucus (sputum).     · You have a new or higher fever.     · You have a new rash.    Watch closely for changes in your health, and be sure to contact your doctor if:    · You cough more deeply or more often, especially if you notice more mucus or a change in the color of your mucus.     · You are not getting better as expected. Where can you learn more? Go to http://anton-esperanza.info/. Enter H333 in the search box to learn more about \"Bronchitis: Care Instructions. \"  Current as of: June 9, 2019  Content Version: 12.2  © 4984-0396 [employer]. Care instructions adapted under license by [employer] (which disclaims liability or warranty for this information). If you have questions about a medical condition or this instruction, always ask your healthcare professional. [address] any warranty or liability for your use of this information.

## 2020-02-13 ENCOUNTER — OFFICE VISIT (OUTPATIENT)
Dept: URGENT CARE | Age: 50
End: 2020-02-13

## 2020-02-13 VITALS
SYSTOLIC BLOOD PRESSURE: 158 MMHG | WEIGHT: 206 LBS | BODY MASS INDEX: 40.44 KG/M2 | HEIGHT: 60 IN | DIASTOLIC BLOOD PRESSURE: 78 MMHG | RESPIRATION RATE: 17 BRPM | OXYGEN SATURATION: 98 % | HEART RATE: 86 BPM | TEMPERATURE: 98.7 F

## 2020-02-13 DIAGNOSIS — J20.9 ACUTE WHEEZY BRONCHITIS: Primary | ICD-10-CM

## 2020-02-13 RX ORDER — PREDNISONE 10 MG/1
TABLET ORAL
Qty: 21 TAB | Refills: 0 | Status: SHIPPED | OUTPATIENT
Start: 2020-02-13 | End: 2021-01-15

## 2020-02-13 RX ORDER — DOXYCYCLINE 100 MG/1
100 CAPSULE ORAL 2 TIMES DAILY
Qty: 14 CAP | Refills: 0 | Status: SHIPPED | OUTPATIENT
Start: 2020-02-13 | End: 2020-02-20

## 2020-02-13 NOTE — PATIENT INSTRUCTIONS
Follow up with PCP without improvement over next 5-7 days sooner/immediately for new or worsening       Bronchitis: Care Instructions  Your Care Instructions    Bronchitis is inflammation of the bronchial tubes, which carry air to the lungs. The tubes swell and produce mucus, or phlegm. The mucus and inflamed bronchial tubes make you cough. You may have trouble breathing. Most cases of bronchitis are caused by viruses like those that cause colds. Antibiotics usually do not help and they may be harmful. Bronchitis usually develops rapidly and lasts about 2 to 3 weeks in otherwise healthy people. Follow-up care is a key part of your treatment and safety. Be sure to make and go to all appointments, and call your doctor if you are having problems. It's also a good idea to know your test results and keep a list of the medicines you take. How can you care for yourself at home? · Take all medicines exactly as prescribed. Call your doctor if you think you are having a problem with your medicine. · Get some extra rest.  · Take an over-the-counter pain medicine, such as acetaminophen (Tylenol), ibuprofen (Advil, Motrin), or naproxen (Aleve) to reduce fever and relieve body aches. Read and follow all instructions on the label. · Do not take two or more pain medicines at the same time unless the doctor told you to. Many pain medicines have acetaminophen, which is Tylenol. Too much acetaminophen (Tylenol) can be harmful. · Take an over-the-counter cough medicine that contains dextromethorphan to help quiet a dry, hacking cough so that you can sleep. Avoid cough medicines that have more than one active ingredient. Read and follow all instructions on the label. · Breathe moist air from a humidifier, hot shower, or sink filled with hot water. The heat and moisture will thin mucus so you can cough it out. · Do not smoke. Smoking can make bronchitis worse.  If you need help quitting, talk to your doctor about stop-smoking programs and medicines. These can increase your chances of quitting for good. When should you call for help? Call 911 anytime you think you may need emergency care. For example, call if:    · You have severe trouble breathing.    Call your doctor now or seek immediate medical care if:    · You have new or worse trouble breathing.     · You cough up dark brown or bloody mucus (sputum).     · You have a new or higher fever.     · You have a new rash.    Watch closely for changes in your health, and be sure to contact your doctor if:    · You cough more deeply or more often, especially if you notice more mucus or a change in the color of your mucus.     · You are not getting better as expected. Where can you learn more? Go to http://anton-esperanza.info/. Enter H333 in the search box to learn more about \"Bronchitis: Care Instructions. \"  Current as of: June 9, 2019  Content Version: 12.2  © 3717-5197 [employer]. Care instructions adapted under license by [employer] (which disclaims liability or warranty for this information). If you have questions about a medical condition or this instruction, always ask your healthcare professional. [address] any warranty or liability for your use of this information.

## 2020-02-13 NOTE — PROGRESS NOTES
47 yo female here for continued cough for 1 week after having cold like symptoms for a week. Cough is dry to wheezy  Has been using albuterol throughout the day today with only temporary relief  Denies any fever, chills, labored breathing, pleuritic pain, LE edema  Symptoms overall worsening  Hx of GERD, asthma, HTN             Past Medical History:   Diagnosis Date    Asthma     SYMPTOM-FREE SINCE     Chronic pain     lower back    Depression     TYPE A DEPRESSION    GERD (gastroesophageal reflux disease)     Hypertension     Other ill-defined conditions(799.89)     VITILIGO HANDS,FACE, JOINTS    Psychiatric disorder     DEPRESSION        Past Surgical History:   Procedure Laterality Date    HX CHOLECYSTECTOMY      HX GYN      NOVASURE ABLATION    HX GYN      D & C    HX HYSTERECTOMY          HX TONSILLECTOMY  AGE 7         Family History   Problem Relation Age of Onset    Diabetes Mother     Thyroid Disease Mother     Other Mother         MVP    Hypertension Mother     Heart Disease Mother     Cancer Father         MULTIPLE SKIN, SARCOMA    Hypertension Father     Heart Attack Maternal Grandfather     Cancer Paternal Grandfather          OF LUNG CA    Ulcerative Colitis Son         Social History     Socioeconomic History    Marital status:      Spouse name: Not on file    Number of children: Not on file    Years of education: Not on file    Highest education level: Not on file   Occupational History    Not on file   Social Needs    Financial resource strain: Not on file    Food insecurity:     Worry: Not on file     Inability: Not on file    Transportation needs:     Medical: Not on file     Non-medical: Not on file   Tobacco Use    Smoking status: Never Smoker    Smokeless tobacco: Never Used   Substance and Sexual Activity    Alcohol use:  Yes     Alcohol/week: 3.0 standard drinks     Types: 3 Shots of liquor per week     Frequency: 2-4 times a month Drinks per session: 1 or 2     Comment: on saturdays    Drug use: No    Sexual activity: Yes     Partners: Male     Birth control/protection: Surgical   Lifestyle    Physical activity:     Days per week: Not on file     Minutes per session: Not on file    Stress: Not on file   Relationships    Social connections:     Talks on phone: Not on file     Gets together: Not on file     Attends Jew service: Not on file     Active member of club or organization: Not on file     Attends meetings of clubs or organizations: Not on file     Relationship status: Not on file    Intimate partner violence:     Fear of current or ex partner: Not on file     Emotionally abused: Not on file     Physically abused: Not on file     Forced sexual activity: Not on file   Other Topics Concern    Not on file   Social History Narrative    Not on file                ALLERGIES: Sulfa dyne    Review of Systems   All other systems reviewed and are negative. Vitals:    02/13/20 1331   BP: 158/78   Pulse: 86   Resp: 17   Temp: 98.7 °F (37.1 °C)   SpO2: 98%   Weight: 206 lb (93.4 kg)   Height: 5' (1.524 m)       Physical Exam  Vitals signs reviewed. Constitutional:       General: She is not in acute distress. Appearance: She is not diaphoretic. HENT:      Right Ear: Tympanic membrane and ear canal normal.      Left Ear: Tympanic membrane and ear canal normal.      Nose: Nose normal. No rhinorrhea. Mouth/Throat:      Mouth: Mucous membranes are moist.      Pharynx: Oropharynx is clear. No oropharyngeal exudate or posterior oropharyngeal erythema. Eyes:      Extraocular Movements: Extraocular movements intact. Pupils: Pupils are equal, round, and reactive to light. Neck:      Musculoskeletal: Normal range of motion and neck supple. No neck rigidity. Cardiovascular:      Rate and Rhythm: Normal rate and regular rhythm. Pulses: Normal pulses. Heart sounds: Normal heart sounds. No murmur.  No friction rub. No gallop. Pulmonary:      Effort: Pulmonary effort is normal. No respiratory distress. Breath sounds: No stridor. Wheezing present. No rhonchi or rales. Comments: Scattered wheeze throughout lung fields bilat  Musculoskeletal:      Right lower leg: No edema. Left lower leg: No edema. Lymphadenopathy:      Cervical: No cervical adenopathy. Skin:     Capillary Refill: Capillary refill takes less than 2 seconds. Neurological:      General: No focal deficit present. Mental Status: She is alert and oriented to person, place, and time. Psychiatric:         Mood and Affect: Mood normal.         Behavior: Behavior normal.         Thought Content: Thought content normal.         MDM     Differential Diagnosis; Clinical Impression; Plan:       CLINICAL IMPRESSION:  (J20.9) Acute wheezy bronchitis  (primary encounter diagnosis)    Orders Placed This Encounter      predniSONE (STERAPRED DS) 10 mg dose pack          Sig: See administration instruction per 10mg dose pack          Dispense:  21 Tab          Refill:  0      doxycycline (VIBRAMYCIN) 100 mg capsule          Sig: Take 1 Cap by mouth two (2) times a day for 7 days. Dispense:  14 Cap          Refill:  0      Plan:  Continue albuterol as prescribed  Start prednisone and doxycycline  Review handouts  Consider CXR for any worsening or non improvement. Today seems more reactive cough/bronchitis. We have reviewed concerning signs/symptoms, normal vs abnormal progression of medical condition and when to seek immediate medical attention. Schedule with PCP or Urgent Care immediately for worsening or new symptoms. See your PCP if there is not at least some improvement in symptoms within the next 1 week  You should see your PCP for updates on your routine health maintenance.              Procedures

## 2020-04-09 DIAGNOSIS — I10 ESSENTIAL HYPERTENSION: ICD-10-CM

## 2020-04-10 RX ORDER — LISINOPRIL 2.5 MG/1
TABLET ORAL
Qty: 90 TAB | Refills: 3 | Status: SHIPPED | OUTPATIENT
Start: 2020-04-10 | End: 2021-04-02

## 2020-05-13 RX ORDER — ALBUTEROL SULFATE 90 UG/1
AEROSOL, METERED RESPIRATORY (INHALATION)
Qty: 8.5 INHALER | Refills: 6 | Status: SHIPPED | OUTPATIENT
Start: 2020-05-13 | End: 2021-01-15 | Stop reason: SDUPTHER

## 2020-12-07 ENCOUNTER — TRANSCRIBE ORDER (OUTPATIENT)
Dept: SCHEDULING | Age: 50
End: 2020-12-07

## 2020-12-07 DIAGNOSIS — M54.50 PAIN, LOW BACK: Primary | ICD-10-CM

## 2020-12-07 DIAGNOSIS — M54.50 LOW BACK PAIN: Primary | ICD-10-CM

## 2020-12-16 RX ORDER — SPIRONOLACTONE 50 MG/1
TABLET, FILM COATED ORAL
Qty: 90 TAB | Refills: 3 | Status: SHIPPED | OUTPATIENT
Start: 2020-12-16 | End: 2021-12-23 | Stop reason: SDUPTHER

## 2020-12-16 RX ORDER — FAMOTIDINE 40 MG/1
TABLET, FILM COATED ORAL
Qty: 90 TAB | Refills: 3 | Status: SHIPPED | OUTPATIENT
Start: 2020-12-16 | End: 2021-12-23 | Stop reason: SDUPTHER

## 2020-12-16 RX ORDER — BUPROPION HYDROCHLORIDE 300 MG/1
TABLET ORAL
Qty: 90 TAB | Refills: 3 | Status: SHIPPED | OUTPATIENT
Start: 2020-12-16 | End: 2021-12-23 | Stop reason: SDUPTHER

## 2020-12-16 NOTE — TELEPHONE ENCOUNTER
Last Visit: 10/16/2019  Next Visit: NONE    Requested Prescriptions     Pending Prescriptions Disp Refills    buPROPion XL (WELLBUTRIN XL) 300 mg XL tablet [Pharmacy Med Name: BUPROPION HCL  MG TABLET] 90 Tab 3     Sig: TAKE 1 TABLET BY MOUTH EVERY DAY    spironolactone (ALDACTONE) 50 mg tablet [Pharmacy Med Name: SPIRONOLACTONE 50 MG TABLET] 90 Tab 3     Sig: TAKE 1 TABLET BY MOUTH EVERY DAY    famotidine (PEPCID) 40 mg tablet [Pharmacy Med Name: FAMOTIDINE 40 MG TABLET] 90 Tab 3     Sig: TAKE 1 TABLET BY MOUTH EVERY DAY

## 2020-12-18 ENCOUNTER — HOSPITAL ENCOUNTER (OUTPATIENT)
Dept: MRI IMAGING | Age: 50
Discharge: HOME | End: 2020-12-18
Payer: COMMERCIAL

## 2020-12-18 DIAGNOSIS — M54.50 LOW BACK PAIN: ICD-10-CM

## 2020-12-18 PROCEDURE — 72148 MRI LUMBAR SPINE W/O DYE: CPT

## 2021-01-15 ENCOUNTER — OFFICE VISIT (OUTPATIENT)
Dept: URGENT CARE | Age: 51
End: 2021-01-15
Payer: COMMERCIAL

## 2021-01-15 VITALS — OXYGEN SATURATION: 98 % | RESPIRATION RATE: 16 BRPM | TEMPERATURE: 98.8 F | HEART RATE: 88 BPM

## 2021-01-15 DIAGNOSIS — Z20.822 ENCOUNTER FOR LABORATORY TESTING FOR COVID-19 VIRUS: Primary | ICD-10-CM

## 2021-01-15 LAB — SARS-COV-2 POC: POSITIVE

## 2021-01-15 PROCEDURE — 99213 OFFICE O/P EST LOW 20 MIN: CPT | Performed by: NURSE PRACTITIONER

## 2021-01-15 PROCEDURE — 87426 SARSCOV CORONAVIRUS AG IA: CPT | Performed by: NURSE PRACTITIONER

## 2021-01-15 RX ORDER — GABAPENTIN 300 MG/1
300 CAPSULE ORAL 3 TIMES DAILY
COMMUNITY
End: 2022-04-05

## 2021-01-15 RX ORDER — ALBUTEROL SULFATE 90 UG/1
AEROSOL, METERED RESPIRATORY (INHALATION)
Qty: 8.5 INHALER | Refills: 6 | Status: SHIPPED | OUTPATIENT
Start: 2021-01-15 | End: 2021-10-09 | Stop reason: ALTCHOICE

## 2021-01-15 NOTE — PROGRESS NOTES
Patient presents with a request for COVID Testing. She was exposed to a COVID positive person (). She is symptomatic with fever, chills, headache and chest tightness. This patient was seen in [facility] at [address] [place] [address] [facility] while in their vehicle due to COVID-19 pandemic with PPE and focused examination in order to decrease community viral transmission. The patient/guardian gave verbal consent to treat. The history is provided by the patient. Flu  This is a new problem. The current episode started more than 2 days ago. The problem occurs constantly. The problem has not changed since onset. Associated symptoms include headaches and shortness of breath. Pertinent negatives include no chest pain.         Past Medical History:   Diagnosis Date    Asthma     SYMPTOM-FREE SINCE     Chronic pain     lower back    Depression     TYPE A DEPRESSION    GERD (gastroesophageal reflux disease)     Hypertension     Other ill-defined conditions(799.89)     VITILIGO HANDS,FACE, JOINTS    Psychiatric disorder     DEPRESSION        Past Surgical History:   Procedure Laterality Date    HX CHOLECYSTECTOMY      HX GYN      NOVASURE ABLATION    HX GYN      D & C    HX HYSTERECTOMY          HX TONSILLECTOMY  AGE 7         Family History   Problem Relation Age of Onset    Diabetes Mother     Thyroid Disease Mother     Other Mother         MVP    Hypertension Mother     Heart Disease Mother     Cancer Father         MULTIPLE SKIN, SARCOMA    Hypertension Father     Heart Attack Maternal Grandfather     Cancer Paternal Grandfather          OF LUNG CA    Ulcerative Colitis Son         Social History     Socioeconomic History    Marital status:      Spouse name: Not on file    Number of children: Not on file    Years of education: Not on file    Highest education level: Not on file   Occupational History    Not on file   Social Needs    Financial resource strain: Not on file    Food insecurity     Worry: Not on file     Inability: Not on file    Transportation needs     Medical: Not on file     Non-medical: Not on file   Tobacco Use    Smoking status: Never Smoker    Smokeless tobacco: Never Used   Substance and Sexual Activity    Alcohol use: Yes     Alcohol/week: 3.0 standard drinks     Types: 3 Shots of liquor per week     Frequency: 2-4 times a month     Drinks per session: 1 or 2     Comment: on saturdays    Drug use: No    Sexual activity: Yes     Partners: Male     Birth control/protection: Surgical   Lifestyle    Physical activity     Days per week: Not on file     Minutes per session: Not on file    Stress: Not on file   Relationships    Social connections     Talks on phone: Not on file     Gets together: Not on file     Attends Congregational service: Not on file     Active member of club or organization: Not on file     Attends meetings of clubs or organizations: Not on file     Relationship status: Not on file    Intimate partner violence     Fear of current or ex partner: Not on file     Emotionally abused: Not on file     Physically abused: Not on file     Forced sexual activity: Not on file   Other Topics Concern    Not on file   Social History Narrative    Not on file                ALLERGIES: Sulfa dyne    Review of Systems   Constitutional: Positive for chills and fever. Negative for activity change, appetite change and fatigue. HENT: Negative for congestion, rhinorrhea, sinus pressure and sinus pain. Respiratory: Positive for shortness of breath. Negative for cough and chest tightness. Cardiovascular: Negative for chest pain. Gastrointestinal: Negative for diarrhea, nausea and vomiting. Neurological: Positive for headaches. Vitals:    01/15/21 1612   Pulse: 88   Resp: 16   Temp: 98.8 °F (37.1 °C)   SpO2: 98%       Physical Exam  Constitutional:       General: She is not in acute distress.      Appearance: She is well-developed. She is ill-appearing. HENT:      Head: Normocephalic. Right Ear: No drainage. Tympanic membrane is not erythematous or bulging. Left Ear: No drainage. Tympanic membrane is not erythematous or bulging. Nose: Nose normal. No congestion or rhinorrhea. Cardiovascular:      Rate and Rhythm: Normal rate. Pulmonary:      Effort: Pulmonary effort is normal. No respiratory distress. Breath sounds: No decreased breath sounds or rhonchi. Neurological:      Mental Status: She is alert. MDM    ICD-10-CM ICD-9-CM    1. Encounter for laboratory testing for COVID-19 virus  Z20.822 V01.79 AMB POC SARS-COV-2     No orders of the defined types were placed in this encounter. No results found for any visits on 01/15/21. The patients condition was discussed with the patient and they understand. The patient is to follow up with primary care doctor. If signs and symptoms become worse the pt is to go to the ER. The patient is to take medications as prescribed.      Procedures

## 2021-03-02 ENCOUNTER — OFFICE VISIT (OUTPATIENT)
Dept: INTERNAL MEDICINE CLINIC | Age: 51
End: 2021-03-02
Payer: COMMERCIAL

## 2021-03-02 VITALS
BODY MASS INDEX: 42.37 KG/M2 | WEIGHT: 215.8 LBS | DIASTOLIC BLOOD PRESSURE: 76 MMHG | HEIGHT: 60 IN | HEART RATE: 87 BPM | OXYGEN SATURATION: 98 % | TEMPERATURE: 98.6 F | RESPIRATION RATE: 16 BRPM | SYSTOLIC BLOOD PRESSURE: 120 MMHG

## 2021-03-02 DIAGNOSIS — E78.2 MIXED HYPERLIPIDEMIA: ICD-10-CM

## 2021-03-02 DIAGNOSIS — I10 ESSENTIAL HYPERTENSION: Primary | ICD-10-CM

## 2021-03-02 DIAGNOSIS — Z11.59 NEED FOR HEPATITIS C SCREENING TEST: ICD-10-CM

## 2021-03-02 DIAGNOSIS — Z13.1 SCREENING FOR DIABETES MELLITUS: ICD-10-CM

## 2021-03-02 DIAGNOSIS — R53.83 FATIGUE, UNSPECIFIED TYPE: ICD-10-CM

## 2021-03-02 DIAGNOSIS — E55.9 VITAMIN D DEFICIENCY: ICD-10-CM

## 2021-03-02 LAB
25(OH)D3 SERPL-MCNC: 57 NG/ML (ref 30–96)
A-G RATIO,AGRAT: 1.9 RATIO
ALBUMIN SERPL-MCNC: 4.4 G/DL (ref 3.9–5.4)
ALP SERPL-CCNC: 88 U/L (ref 38–126)
ALT SERPL-CCNC: 23 U/L (ref 0–35)
ANION GAP SERPL CALC-SCNC: 11 MMOL/L
AST SERPL W P-5'-P-CCNC: 24 U/L (ref 14–36)
BILIRUB SERPL-MCNC: 0.6 MG/DL (ref 0.2–1.3)
BILIRUB UR QL: NEGATIVE
BUN SERPL-MCNC: 14 MG/DL (ref 7–17)
BUN/CREATININE RATIO,BUCR: 14 RATIO
CALCIUM SERPL-MCNC: 9.5 MG/DL (ref 8.4–10.2)
CHLORIDE SERPL-SCNC: 105 MMOL/L (ref 98–107)
CHOL/HDL RATIO,CHHD: 3 RATIO (ref 0–4)
CHOLEST SERPL-MCNC: 182 MG/DL (ref 0–200)
CLARITY: CLEAR
CO2 SERPL-SCNC: 25 MMOL/L (ref 22–32)
COLOR UR: NORMAL
CREAT SERPL-MCNC: 1 MG/DL (ref 0.7–1.2)
ERYTHROCYTE [DISTWIDTH] IN BLOOD BY AUTOMATED COUNT: 13.4 %
GLOBULIN,GLOB: 2.3
GLUCOSE 24H UR-MRATE: NEGATIVE G/(24.H)
GLUCOSE SERPL-MCNC: 84 MG/DL (ref 65–105)
HBA1C MFR BLD HPLC: 5.3 % (ref 4–5.7)
HCT VFR BLD AUTO: 40.7 % (ref 37–51)
HDLC SERPL-MCNC: 59 MG/DL (ref 35–130)
HGB BLD-MCNC: 13.4 G/DL (ref 12–18)
HGB UR QL STRIP: NEGATIVE
KETONES UR QL STRIP.AUTO: NEGATIVE
LDL/HDL RATIO,LDHD: 2 RATIO
LDLC SERPL CALC-MCNC: 106 MG/DL (ref 0–130)
LEUKOCYTE ESTERASE: NEGATIVE
LYMPHOCYTES ABSOLUTE: 1.9 K/UL (ref 0.6–4.1)
LYMPHOCYTES NFR BLD: 23.1 % (ref 10–58.5)
MCH RBC QN AUTO: 30.4 PG (ref 26–32)
MCHC RBC AUTO-ENTMCNC: 32.9 G/DL (ref 30–36)
MCV RBC AUTO: 92.2 FL (ref 80–97)
MONOCYTES ABS-DIF,2141: 0.7 K/UL (ref 0–1.8)
MONOCYTES NFR BLD: 8.5 % (ref 0.1–24)
NEUTROPHILS # BLD: 68.4 % (ref 37–92)
NEUTROPHILS ABS,2156: 5.5 K/UL (ref 2–7.8)
NITRITE UR QL STRIP.AUTO: NEGATIVE
PH UR STRIP: 6 [PH] (ref 5–7)
PLATELET # BLD AUTO: 263 K/UL (ref 140–440)
POTASSIUM SERPL-SCNC: 4.3 MMOL/L (ref 3.6–5)
PROT SERPL-MCNC: 6.7 G/DL (ref 6.3–8.2)
PROT UR STRIP-MCNC: NEGATIVE MG/DL
RBC # BLD AUTO: 4.41 M/UL (ref 4.2–6.3)
SODIUM SERPL-SCNC: 141 MMOL/L (ref 137–145)
SP GR UR REFRACTOMETRY: 1.01 (ref 1–1.03)
T4 FREE SERPL-MCNC: 0.86 NG/DL (ref 0.58–2.3)
TRIGL SERPL-MCNC: 87 MG/DL (ref 0–200)
TSH SERPL DL<=0.05 MIU/L-ACNC: 1.51 UIU/ML (ref 0.34–5.6)
UROBILINOGEN UR QL STRIP.AUTO: NEGATIVE
VLDLC SERPL CALC-MCNC: 17 MG/DL
WBC # BLD AUTO: 8.1 K/UL (ref 4.1–10.9)

## 2021-03-02 PROCEDURE — 82306 VITAMIN D 25 HYDROXY: CPT | Performed by: NURSE PRACTITIONER

## 2021-03-02 PROCEDURE — 81003 URINALYSIS AUTO W/O SCOPE: CPT | Performed by: NURSE PRACTITIONER

## 2021-03-02 PROCEDURE — 85025 COMPLETE CBC W/AUTO DIFF WBC: CPT | Performed by: NURSE PRACTITIONER

## 2021-03-02 PROCEDURE — 80061 LIPID PANEL: CPT | Performed by: NURSE PRACTITIONER

## 2021-03-02 PROCEDURE — 84443 ASSAY THYROID STIM HORMONE: CPT | Performed by: NURSE PRACTITIONER

## 2021-03-02 PROCEDURE — 84439 ASSAY OF FREE THYROXINE: CPT | Performed by: NURSE PRACTITIONER

## 2021-03-02 PROCEDURE — 83036 HEMOGLOBIN GLYCOSYLATED A1C: CPT | Performed by: NURSE PRACTITIONER

## 2021-03-02 PROCEDURE — 99214 OFFICE O/P EST MOD 30 MIN: CPT | Performed by: NURSE PRACTITIONER

## 2021-03-02 PROCEDURE — 80053 COMPREHEN METABOLIC PANEL: CPT | Performed by: NURSE PRACTITIONER

## 2021-03-02 RX ORDER — METAXALONE 800 MG/1
800 TABLET ORAL 2 TIMES DAILY
COMMUNITY
End: 2022-05-26 | Stop reason: SDUPTHER

## 2021-03-02 NOTE — PROGRESS NOTES
Subjective:  Ms. Aracelis Woodward is a pleasant 20-year-old lady who comes in today upon my request since she has not been seen here in several months. She is here for follow up of her medical problems. She has no complaints. 1. Asthma, managed on Albuterol inhaler, two puffs every four to six hours as needed. She is doing well from that standpoint. She denies any shortness of breath, cough, wheezing or hemoptysis. 2. Hypertension, managed on Spironolactone 50 mg daily, along with Lisinopril 2.5 mg daily. Denies any headaches, dizziness or blurred vision. She is in need of getting her eye exam.  She denies any chest pain or palpitations. She denies any PND or orthopnea. 3. GERD, managed on Pepcid 40 mg daily. Her appetite remains good. Her stools are normal.      Past Medical History:   Diagnosis Date    Asthma     SYMPTOM-FREE SINCE 2007    Chronic pain     lower back    Depression     TYPE A DEPRESSION    GERD (gastroesophageal reflux disease)     Hypertension     Other ill-defined conditions(799.89)     VITILIGO HANDS,FACE, JOINTS    Psychiatric disorder     DEPRESSION     Past Surgical History:   Procedure Laterality Date    HX CHOLECYSTECTOMY      HX GYN  7/09    NOVASURE ABLATION    HX GYN      D & C    HX HYSTERECTOMY      2012    HX TONSILLECTOMY  AGE 7       Current Outpatient Medications on File Prior to Visit   Medication Sig Dispense Refill    metaxalone (SKELAXIN) 800 mg tablet Take 800 mg by mouth three (3) times daily.  gabapentin (NEURONTIN) 300 mg capsule Take 300 mg by mouth three (3) times daily.       albuterol (PROVENTIL HFA, VENTOLIN HFA, PROAIR HFA) 90 mcg/actuation inhaler INHALE 2 PUFFS BY MOUTH EVERY 4 HOURS AS NEEDED FOR WHEEZE 8.5 Inhaler 6    buPROPion XL (WELLBUTRIN XL) 300 mg XL tablet TAKE 1 TABLET BY MOUTH EVERY DAY 90 Tab 3    spironolactone (ALDACTONE) 50 mg tablet TAKE 1 TABLET BY MOUTH EVERY DAY 90 Tab 3    famotidine (PEPCID) 40 mg tablet TAKE 1 TABLET BY MOUTH EVERY DAY 90 Tab 3    lisinopriL (PRINIVIL, ZESTRIL) 2.5 mg tablet TAKE 1 TABLET BY MOUTH EVERY DAY 90 Tab 3    cholecalciferol, VITAMIN D3, (VITAMIN D3) 5,000 unit tab tablet Take  by mouth daily.  levocetirizine (XYZAL) 5 mg tablet Take  by mouth.  XL-LK-TF-Fe-Min-Lycopen-Lutein (CENTRUM) 0.4-162-18 mg Tab Take  by mouth daily.  methocarbamol (ROBAXIN) 500 mg tablet Take 500 mg by mouth daily. No current facility-administered medications on file prior to visit. Allergies   Allergen Reactions    Sulfa Dyne Hives and Itching     Physical Examination:  GENERAL:  Pleasant lady in no acute distress. She is alert and oriented. She answers my questions appropriately. VITALS:  Blood Pressure:  120/76. Pulse:  87. Temperature:  98.6. O2 sat:  98. HEENT:  Normocephalic, atraumatic. NECK:  Supple without adenopathy. No thyromegaly or carotid bruits. CHEST:  Lungs clear to auscultation, no rales or wheezes. CV:  Heart regular rhythm without murmur or gallop. EXTREMITIES:  No edema or calf tenderness. Distal pulses were present. Impression:  1. Hypertension, stable. 2. GERD. 3. Asthma. 4. Chronic low back pain. 5. Chronic depression. Plan:  1. She was fasting this morning, so it was opted to do all of her lab studies. I will call her as soon as I have her results. Otherwise she will return at a later date for her updated history and physical.  2. We discussed the importance of getting her mammogram done. 3. We will get her records from [facility] in regards to her follow up for colonoscopy.

## 2021-03-02 NOTE — PATIENT INSTRUCTIONS

## 2021-03-02 NOTE — PROGRESS NOTES
Clair Lovelace is a 46 y.o. female     Chief Complaint   Patient presents with    Hypertension     follow up       Visit Vitals  /76 (BP 1 Location: Left upper arm, BP Patient Position: Sitting, BP Cuff Size: Large adult)   Pulse 87   Temp 98.6 °F (37 °C) (Temporal)   Resp 16   Ht 5' (1.524 m)   Wt 215 lb 12.8 oz (97.9 kg)   LMP 11/01/2012   SpO2 98%   BMI 42.15 kg/m²       Health Maintenance Due   Topic Date Due    Hepatitis C Screening  Never done    COVID-19 Vaccine (1 of 2) Never done    PAP AKA CERVICAL CYTOLOGY  Never done    Shingrix Vaccine Age 50> (1 of 2) Never done    Colorectal Cancer Screening Combo  Never done    Breast Cancer Screen Mammogram  01/30/2020    Flu Vaccine (1) Never done       1. Have you been to the ER, urgent care clinic since your last visit? Hospitalized since your last visit? No    2. Have you seen or consulted any other health care providers outside of the [address] [place] [address] since your last visit? Include any pap smears or colon screening.  No

## 2021-03-03 LAB — HCV AB S/CO SERPL IA: <0.1 S/CO RATIO (ref 0–0.9)

## 2021-03-04 NOTE — PROGRESS NOTES
Can you tell Mrs Kathie Parmar that her lab studies are all normal. I left her a message to call the office.

## 2021-03-04 NOTE — PROGRESS NOTES
Spoke to patient and gave her her results. She wanted to let you know that she talked her father into getting the covid vaccine and he is scheduled to get his first shot on Monday.

## 2021-04-02 DIAGNOSIS — I10 ESSENTIAL HYPERTENSION: ICD-10-CM

## 2021-04-02 RX ORDER — LISINOPRIL 2.5 MG/1
TABLET ORAL
Qty: 90 TAB | Refills: 3 | Status: SHIPPED | OUTPATIENT
Start: 2021-04-02 | End: 2021-12-23 | Stop reason: SDUPTHER

## 2021-04-27 ENCOUNTER — APPOINTMENT (OUTPATIENT)
Dept: CT IMAGING | Age: 51
End: 2021-04-27
Attending: EMERGENCY MEDICINE
Payer: COMMERCIAL

## 2021-04-27 ENCOUNTER — HOSPITAL ENCOUNTER (EMERGENCY)
Age: 51
Discharge: HOME | End: 2021-04-27
Attending: EMERGENCY MEDICINE
Payer: COMMERCIAL

## 2021-04-27 ENCOUNTER — APPOINTMENT (OUTPATIENT)
Dept: GENERAL RADIOLOGY | Age: 51
End: 2021-04-27
Attending: EMERGENCY MEDICINE
Payer: COMMERCIAL

## 2021-04-27 VITALS
BODY MASS INDEX: 40.44 KG/M2 | DIASTOLIC BLOOD PRESSURE: 89 MMHG | OXYGEN SATURATION: 99 % | HEART RATE: 73 BPM | TEMPERATURE: 98.4 F | SYSTOLIC BLOOD PRESSURE: 139 MMHG | HEIGHT: 60 IN | RESPIRATION RATE: 8 BRPM | WEIGHT: 206 LBS

## 2021-04-27 DIAGNOSIS — K29.00 ACUTE GASTRITIS WITHOUT HEMORRHAGE, UNSPECIFIED GASTRITIS TYPE: ICD-10-CM

## 2021-04-27 DIAGNOSIS — R19.7 DIARRHEA, UNSPECIFIED TYPE: ICD-10-CM

## 2021-04-27 DIAGNOSIS — R07.9 ACUTE CHEST PAIN: Primary | ICD-10-CM

## 2021-04-27 LAB
ALBUMIN SERPL-MCNC: 4.3 G/DL (ref 3.5–5)
ALBUMIN/GLOB SERPL: 1.4 {RATIO} (ref 1.1–2.2)
ALP SERPL-CCNC: 100 U/L (ref 45–117)
ALT SERPL-CCNC: 36 U/L (ref 12–78)
ANION GAP SERPL CALC-SCNC: 4 MMOL/L (ref 5–15)
AST SERPL-CCNC: 22 U/L (ref 15–37)
ATRIAL RATE: 84 BPM
BASOPHILS # BLD: 0.1 K/UL (ref 0–0.1)
BASOPHILS NFR BLD: 1 % (ref 0–1)
BILIRUB SERPL-MCNC: 0.3 MG/DL (ref 0.2–1)
BUN SERPL-MCNC: 14 MG/DL (ref 6–20)
BUN/CREAT SERPL: 15 (ref 12–20)
CALCIUM SERPL-MCNC: 8.8 MG/DL (ref 8.5–10.1)
CALCULATED P AXIS, ECG09: 60 DEGREES
CALCULATED R AXIS, ECG10: 22 DEGREES
CALCULATED T AXIS, ECG11: 40 DEGREES
CHLORIDE SERPL-SCNC: 107 MMOL/L (ref 97–108)
CO2 SERPL-SCNC: 25 MMOL/L (ref 21–32)
CREAT SERPL-MCNC: 0.93 MG/DL (ref 0.55–1.02)
DIAGNOSIS, 93000: NORMAL
DIFFERENTIAL METHOD BLD: NORMAL
EOSINOPHIL # BLD: 0.4 K/UL (ref 0–0.4)
EOSINOPHIL NFR BLD: 6 % (ref 0–7)
ERYTHROCYTE [DISTWIDTH] IN BLOOD BY AUTOMATED COUNT: 12.3 % (ref 11.5–14.5)
GLOBULIN SER CALC-MCNC: 3 G/DL (ref 2–4)
GLUCOSE SERPL-MCNC: 95 MG/DL (ref 65–100)
HCT VFR BLD AUTO: 39 % (ref 35–47)
HGB BLD-MCNC: 13.6 G/DL (ref 11.5–16)
IMM GRANULOCYTES # BLD AUTO: 0 K/UL (ref 0–0.04)
IMM GRANULOCYTES NFR BLD AUTO: 0 % (ref 0–0.5)
LYMPHOCYTES # BLD: 1.4 K/UL (ref 0.8–3.5)
LYMPHOCYTES NFR BLD: 19 % (ref 12–49)
MCH RBC QN AUTO: 31.1 PG (ref 26–34)
MCHC RBC AUTO-ENTMCNC: 34.9 G/DL (ref 30–36.5)
MCV RBC AUTO: 89.2 FL (ref 80–99)
MONOCYTES # BLD: 0.5 K/UL (ref 0–1)
MONOCYTES NFR BLD: 7 % (ref 5–13)
NEUTS SEG # BLD: 5 K/UL (ref 1.8–8)
NEUTS SEG NFR BLD: 67 % (ref 32–75)
NRBC # BLD: 0 K/UL (ref 0–0.01)
NRBC BLD-RTO: 0 PER 100 WBC
P-R INTERVAL, ECG05: 148 MS
PLATELET # BLD AUTO: 290 K/UL (ref 150–400)
PMV BLD AUTO: 9.5 FL (ref 8.9–12.9)
POTASSIUM SERPL-SCNC: 4.1 MMOL/L (ref 3.5–5.1)
PROT SERPL-MCNC: 7.3 G/DL (ref 6.4–8.2)
Q-T INTERVAL, ECG07: 390 MS
QRS DURATION, ECG06: 82 MS
QTC CALCULATION (BEZET), ECG08: 460 MS
RBC # BLD AUTO: 4.37 M/UL (ref 3.8–5.2)
SODIUM SERPL-SCNC: 136 MMOL/L (ref 136–145)
TROPONIN I SERPL-MCNC: <0.05 NG/ML
TROPONIN I SERPL-MCNC: <0.05 NG/ML
VENTRICULAR RATE, ECG03: 84 BPM
WBC # BLD AUTO: 7.4 K/UL (ref 3.6–11)

## 2021-04-27 PROCEDURE — 71046 X-RAY EXAM CHEST 2 VIEWS: CPT

## 2021-04-27 PROCEDURE — 84484 ASSAY OF TROPONIN QUANT: CPT

## 2021-04-27 PROCEDURE — 74011250636 HC RX REV CODE- 250/636: Performed by: EMERGENCY MEDICINE

## 2021-04-27 PROCEDURE — 87506 IADNA-DNA/RNA PROBE TQ 6-11: CPT

## 2021-04-27 PROCEDURE — 85025 COMPLETE CBC W/AUTO DIFF WBC: CPT

## 2021-04-27 PROCEDURE — 89055 LEUKOCYTE ASSESSMENT FECAL: CPT

## 2021-04-27 PROCEDURE — 74177 CT ABD & PELVIS W/CONTRAST: CPT

## 2021-04-27 PROCEDURE — 96374 THER/PROPH/DIAG INJ IV PUSH: CPT

## 2021-04-27 PROCEDURE — 93005 ELECTROCARDIOGRAM TRACING: CPT

## 2021-04-27 PROCEDURE — 96375 TX/PRO/DX INJ NEW DRUG ADDON: CPT

## 2021-04-27 PROCEDURE — 74011000636 HC RX REV CODE- 636: Performed by: EMERGENCY MEDICINE

## 2021-04-27 PROCEDURE — 36415 COLL VENOUS BLD VENIPUNCTURE: CPT

## 2021-04-27 PROCEDURE — 99285 EMERGENCY DEPT VISIT HI MDM: CPT

## 2021-04-27 PROCEDURE — 80053 COMPREHEN METABOLIC PANEL: CPT

## 2021-04-27 RX ORDER — MORPHINE SULFATE 2 MG/ML
2 INJECTION, SOLUTION INTRAMUSCULAR; INTRAVENOUS
Status: DISCONTINUED | OUTPATIENT
Start: 2021-04-27 | End: 2021-04-27 | Stop reason: HOSPADM

## 2021-04-27 RX ORDER — ONDANSETRON 2 MG/ML
4 INJECTION INTRAMUSCULAR; INTRAVENOUS
Status: COMPLETED | OUTPATIENT
Start: 2021-04-27 | End: 2021-04-27

## 2021-04-27 RX ORDER — SUCRALFATE 1 G/1
1 TABLET ORAL 4 TIMES DAILY
Qty: 30 TAB | Refills: 0 | Status: SHIPPED | OUTPATIENT
Start: 2021-04-27 | End: 2021-05-04 | Stop reason: SDUPTHER

## 2021-04-27 RX ADMIN — ONDANSETRON 4 MG: 2 INJECTION INTRAMUSCULAR; INTRAVENOUS at 14:48

## 2021-04-27 RX ADMIN — IOPAMIDOL 100 ML: 755 INJECTION, SOLUTION INTRAVENOUS at 16:26

## 2021-04-27 RX ADMIN — SODIUM CHLORIDE 1000 ML: 9 INJECTION, SOLUTION INTRAVENOUS at 15:20

## 2021-04-27 NOTE — ED NOTES
Opal Johnson RN reviewed discharge instructions with the patient. The patient verbalized understanding. All questions and concerns were addressed. The patient declined discharge via wheelchair in the care of family members with instructions and prescriptions in hand. Pt is alert and oriented x 4. Respirations are clear and unlabored.

## 2021-04-27 NOTE — ED PROVIDER NOTES
EMERGENCY DEPARTMENT HISTORY AND PHYSICAL EXAM      Date: 4/27/2021  Patient Name: Paul Wilson    History of Presenting Illness     Chief Complaint   Patient presents with    Chest Pain     sitting at desk in a work from home meeting; began with sweating and chest pain ; she thought belching would help. pain midsternal. given ntg and aspirin pta by rescue       History Provided By: Patient    HPI: Paul Wilson, 46 y.o. female presents to the ED with cc of chest pain and diarrhea. Patient's diarrhea started 9 days ago. Initially, she used Imodium to help with the symptoms, but it stopped working. She states that she has had 10 loose stools today. She denies any recent antibiotic use and also denies abdominal pain. She developed chest pain while sitting at a desk in a work meeting. It came on at 11:30 AM and was associated with diaphoresis. It is located in the midsternal region and there was no radiation of pain to the neck, back, jaw or arms. She also denies shortness of breath or nausea. She has never had a stress test in the past.  She denies cough, fever, leg pain or leg edema. Her pain was an 8 out of 10 in severity. She received aspirin and nitro via EMS. Now her pain is a 2 out of 10. She describes it as a pressure sensation. There are no other complaints, changes, or physical findings at this time. PCP: Nasir Lizarraga NP    No current facility-administered medications on file prior to encounter. Current Outpatient Medications on File Prior to Encounter   Medication Sig Dispense Refill    lisinopriL (PRINIVIL, ZESTRIL) 2.5 mg tablet TAKE 1 TABLET BY MOUTH EVERY DAY 90 Tab 3    metaxalone (SKELAXIN) 800 mg tablet Take 800 mg by mouth three (3) times daily.  gabapentin (NEURONTIN) 300 mg capsule Take 300 mg by mouth three (3) times daily.       albuterol (PROVENTIL HFA, VENTOLIN HFA, PROAIR HFA) 90 mcg/actuation inhaler INHALE 2 PUFFS BY MOUTH EVERY 4 HOURS AS NEEDED FOR WHEEZE 8.5 Inhaler 6    buPROPion XL (WELLBUTRIN XL) 300 mg XL tablet TAKE 1 TABLET BY MOUTH EVERY DAY 90 Tab 3    spironolactone (ALDACTONE) 50 mg tablet TAKE 1 TABLET BY MOUTH EVERY DAY 90 Tab 3    famotidine (PEPCID) 40 mg tablet TAKE 1 TABLET BY MOUTH EVERY DAY 90 Tab 3    methocarbamol (ROBAXIN) 500 mg tablet Take 500 mg by mouth daily.  cholecalciferol, VITAMIN D3, (VITAMIN D3) 5,000 unit tab tablet Take  by mouth daily.  levocetirizine (XYZAL) 5 mg tablet Take  by mouth.  JV-SN-UV-Fe-Min-Lycopen-Lutein (CENTRUM) 0.4-162-18 mg Tab Take  by mouth daily. Past History     Past Medical History:  Past Medical History:   Diagnosis Date    Asthma     SYMPTOM-FREE SINCE     Chronic pain     lower back    Depression     TYPE A DEPRESSION    GERD (gastroesophageal reflux disease)     Hypertension     Other ill-defined conditions(799.89)     VITILIGO HANDS,FACE, JOINTS    Psychiatric disorder     DEPRESSION       Past Surgical History:  Past Surgical History:   Procedure Laterality Date    HX CHOLECYSTECTOMY      HX GYN      NOVASURE ABLATION    HX GYN      D & C    HX HYSTERECTOMY      2012    HX TONSILLECTOMY  AGE 7       Family History:  Family History   Problem Relation Age of Onset    Diabetes Mother     Thyroid Disease Mother     Other Mother         MVP    Hypertension Mother     Heart Disease Mother     Cancer Father         MULTIPLE SKIN, SARCOMA    Hypertension Father     Heart Attack Maternal Grandfather     Cancer Paternal Grandfather          OF LUNG CA    Ulcerative Colitis Son        Social History:  Social History     Tobacco Use    Smoking status: Never Smoker    Smokeless tobacco: Never Used   Substance Use Topics    Alcohol use: Yes     Alcohol/week: 3.0 standard drinks     Types: 3 Shots of liquor per week     Frequency: 2-4 times a month     Drinks per session: 1 or 2     Comment: on     Drug use:  No Allergies: Allergies   Allergen Reactions    Sulfa Dyne Hives and Itching         Review of Systems   Review of Systems   Constitutional: Positive for diaphoresis. Negative for fever. HENT: Negative for congestion. Eyes: Negative. Respiratory: Negative for shortness of breath. Cardiovascular: Positive for chest pain. Gastrointestinal: Positive for diarrhea. Endocrine: Negative for heat intolerance. Genitourinary: Negative. Musculoskeletal: Negative for back pain. Skin: Negative for rash. Allergic/Immunologic: Negative for immunocompromised state. Neurological: Positive for light-headedness. Hematological: Does not bruise/bleed easily. Psychiatric/Behavioral: Negative. All other systems reviewed and are negative. Physical Exam   Physical Exam  Vitals signs and nursing note reviewed. Constitutional:       General: She is not in acute distress. Appearance: She is well-developed. HENT:      Head: Normocephalic. Neck:      Musculoskeletal: Normal range of motion and neck supple. Cardiovascular:      Rate and Rhythm: Normal rate and regular rhythm. Heart sounds: Normal heart sounds. Pulmonary:      Effort: Pulmonary effort is normal.      Breath sounds: Normal breath sounds. Chest:      Chest wall: No tenderness. Abdominal:      General: Bowel sounds are normal.      Palpations: Abdomen is soft. Tenderness: There is no abdominal tenderness. Musculoskeletal: Normal range of motion. Skin:     General: Skin is warm and dry. Neurological:      General: No focal deficit present. Mental Status: She is alert and oriented to person, place, and time.    Psychiatric:         Mood and Affect: Mood normal.         Behavior: Behavior normal.         Diagnostic Study Results     Labs -     Recent Results (from the past 12 hour(s))   EKG, 12 LEAD, INITIAL    Collection Time: 04/27/21 12:55 PM   Result Value Ref Range    Ventricular Rate 84 BPM    Atrial Rate 84 BPM    P-R Interval 148 ms    QRS Duration 82 ms    Q-T Interval 390 ms    QTC Calculation (Bezet) 460 ms    Calculated P Axis 60 degrees    Calculated R Axis 22 degrees    Calculated T Axis 40 degrees    Diagnosis       Normal sinus rhythm  Normal ECG    No previous ECGs available  Confirmed by Esperanza Thornton ([id number]) on 4/27/2021 4:13:22 PM     CBC WITH AUTOMATED DIFF    Collection Time: 04/27/21  1:24 PM   Result Value Ref Range    WBC 7.4 3.6 - 11.0 K/uL    RBC 4.37 3.80 - 5.20 M/uL    HGB 13.6 11.5 - 16.0 g/dL    HCT 39.0 35.0 - 47.0 %    MCV 89.2 80.0 - 99.0 FL    MCH 31.1 26.0 - 34.0 PG    MCHC 34.9 30.0 - 36.5 g/dL    RDW 12.3 11.5 - 14.5 %    PLATELET 750 402 - 747 K/uL    MPV 9.5 8.9 - 12.9 FL    NRBC 0.0 0  WBC    ABSOLUTE NRBC 0.00 0.00 - 0.01 K/uL    NEUTROPHILS 67 32 - 75 %    LYMPHOCYTES 19 12 - 49 %    MONOCYTES 7 5 - 13 %    EOSINOPHILS 6 0 - 7 %    BASOPHILS 1 0 - 1 %    IMMATURE GRANULOCYTES 0 0.0 - 0.5 %    ABS. NEUTROPHILS 5.0 1.8 - 8.0 K/UL    ABS. LYMPHOCYTES 1.4 0.8 - 3.5 K/UL    ABS. MONOCYTES 0.5 0.0 - 1.0 K/UL    ABS. EOSINOPHILS 0.4 0.0 - 0.4 K/UL    ABS. BASOPHILS 0.1 0.0 - 0.1 K/UL    ABS. IMM. GRANS. 0.0 0.00 - 0.04 K/UL    DF AUTOMATED     METABOLIC PANEL, COMPREHENSIVE    Collection Time: 04/27/21  1:24 PM   Result Value Ref Range    Sodium 136 136 - 145 mmol/L    Potassium 4.1 3.5 - 5.1 mmol/L    Chloride 107 97 - 108 mmol/L    CO2 25 21 - 32 mmol/L    Anion gap 4 (L) 5 - 15 mmol/L    Glucose 95 65 - 100 mg/dL    BUN 14 6 - 20 MG/DL    Creatinine 0.93 0.55 - 1.02 MG/DL    BUN/Creatinine ratio 15 12 - 20      GFR est AA >60 >60 ml/min/1.73m2    GFR est non-AA >60 >60 ml/min/1.73m2    Calcium 8.8 8.5 - 10.1 MG/DL    Bilirubin, total 0.3 0.2 - 1.0 MG/DL    ALT (SGPT) 36 12 - 78 U/L    AST (SGOT) 22 15 - 37 U/L    Alk.  phosphatase 100 45 - 117 U/L    Protein, total 7.3 6.4 - 8.2 g/dL    Albumin 4.3 3.5 - 5.0 g/dL    Globulin 3.0 2.0 - 4.0 g/dL    A-G Ratio 1.4 1.1 - 2.2 TROPONIN I    Collection Time: 04/27/21  1:24 PM   Result Value Ref Range    Troponin-I, Qt. <0.05 <0.05 ng/mL   TROPONIN I    Collection Time: 04/27/21  2:45 PM   Result Value Ref Range    Troponin-I, Qt. <0.05 <0.05 ng/mL       Radiologic Studies -   CT ABD PELV W CONT   Final Result   Mild gastritis   No focal bowel process identified   Grade 1 anterolisthesis of L4 on a degenerative basis         XR CHEST PA LAT   Final Result    No acute cardiopulmonary disease radiographically. .  . CT Results  (Last 48 hours)    None        CXR Results  (Last 48 hours)               04/27/21 1348  XR CHEST PA LAT Final result    Impression:   No acute cardiopulmonary disease radiographically. .  . Narrative:  INDICATION:  chest pain midsternal.       EXAM: 2 VIEW CHEST RADIOGRAPH. COMPARISON: None. FINDINGS: Frontal and lateral views of the chest show clear lungs. . The heart,   mediastinum and pulmonary vasculature are normal.  The bony thorax is   unremarkable for age. ..                 Medical Decision Making   I am the first provider for this patient. I reviewed the vital signs, available nursing notes, past medical history, past surgical history, family history and social history. Vital Signs-Reviewed the patient's vital signs. Patient Vitals for the past 12 hrs:   Temp Pulse Resp BP SpO2   04/27/21 1247 98.4 °F (36.9 °C) 88 16 139/88 96 %       EKG interpretation: (Preliminary)  Rhythm: normal sinus rhythm; and regular . Rate (approx.): 84; Axis: normal; WI interval: normal; QRS interval: normal ; ST/T wave: non-specific changes; Other findings: .    Records Reviewed: Nursing Notes and Old Medical Records    Provider Notes (Medical Decision Making):   ACS, reflux, gastritis, gastroenteritis, colitis, dehydration, electrolyte abnormality    ED Course:   Initial assessment performed.  The patients presenting problems have been discussed, and they are in agreement with the care plan formulated and outlined with them. I have encouraged them to ask questions as they arise throughout their visit. Progress note:    Patient is feeling better. Her pain resolved without medication. Her results have been reviewed. She is advised to follow-up and return to ER if worse               Critical Care Time:   0    Disposition:  home    DISCHARGE PLAN:  1. Discharge Medication List as of 4/27/2021  5:04 PM      START taking these medications    Details   sucralfate (Carafate) 1 gram tablet Take 1 Tab by mouth four (4) times daily. , Normal, Disp-30 Tab, R-0         CONTINUE these medications which have NOT CHANGED    Details   lisinopriL (PRINIVIL, ZESTRIL) 2.5 mg tablet TAKE 1 TABLET BY MOUTH EVERY DAY, Normal, Disp-90 Tab, R-3      metaxalone (SKELAXIN) 800 mg tablet Take 800 mg by mouth three (3) times daily. , Historical Med      gabapentin (NEURONTIN) 300 mg capsule Take 300 mg by mouth three (3) times daily. , Historical Med      albuterol (PROVENTIL HFA, VENTOLIN HFA, PROAIR HFA) 90 mcg/actuation inhaler INHALE 2 PUFFS BY MOUTH EVERY 4 HOURS AS NEEDED FOR WHEEZE, Normal, Disp-8.5 Inhaler, R-6      buPROPion XL (WELLBUTRIN XL) 300 mg XL tablet TAKE 1 TABLET BY MOUTH EVERY DAY, Normal, Disp-90 Tab, R-3      spironolactone (ALDACTONE) 50 mg tablet TAKE 1 TABLET BY MOUTH EVERY DAY, Normal, Disp-90 Tab, R-3      famotidine (PEPCID) 40 mg tablet TAKE 1 TABLET BY MOUTH EVERY DAY, Normal, Disp-90 Tab, R-3      methocarbamol (ROBAXIN) 500 mg tablet Take 500 mg by mouth daily. , Historical Med      cholecalciferol, VITAMIN D3, (VITAMIN D3) 5,000 unit tab tablet Take  by mouth daily. , Historical Med      levocetirizine (XYZAL) 5 mg tablet Take  by mouth., Historical Med      BH-DL-AD-Fe-Min-Lycopen-Lutein (CENTRUM) 0.4-162-18 mg Tab Take  by mouth daily. , Historical Med           2.    Follow-up Information     Follow up With Specialties Details Why Contact Info    Rachelle Ross NP Nurse Practitioner  As needed [address]  [phone]      Dariel Garrett MD Cardiology, [address] [facility] V[facility] Surgery Call in 1 day  [address]      Kathy Trujillo MD Gastroenterology  As needed [address] [facility]  [address] [facility]  [phone]      [facility] EMERGENCY DEPT Emergency Medicine  If symptoms worsen [address] [facility]  [address] [facility]  [phone]        3. Return to ED if worse     Diagnosis     Clinical Impression:   1. Acute chest pain    2. Diarrhea, unspecified type    3. Acute gastritis without hemorrhage, unspecified gastritis type        Attestations:    Adrian Caballero MD    Please note that this dictation was completed with [employer], the computer voice recognition software. Quite often unanticipated grammatical, syntax, homophones, and other interpretive errors are inadvertently transcribed by the computer software. Please disregard these errors. Please excuse any errors that have escaped final proofreading. Thank you.

## 2021-04-28 ENCOUNTER — TELEPHONE (OUTPATIENT)
Dept: INTERNAL MEDICINE CLINIC | Age: 51
End: 2021-04-28

## 2021-04-28 DIAGNOSIS — R19.7 DIARRHEA, UNSPECIFIED TYPE: Primary | ICD-10-CM

## 2021-04-28 LAB
CAMPYLOBACTER SPECIES, DNA: NEGATIVE
ENTEROTOXIGEN E COLI, DNA: NEGATIVE
P SHIGELLOIDES DNA STL QL NAA+PROBE: NEGATIVE
SALMONELLA SPECIES, DNA: NEGATIVE
SHIGA TOXIN PRODUCING, DNA: NEGATIVE
SHIGELLA SP+EIEC IPAH STL QL NAA+PROBE: NEGATIVE
VIBRIO SPECIES, DNA: NEGATIVE
WBC #/AREA STL HPF: NORMAL /HPF (ref 0–4)
Y. ENTEROCOLITICA, DNA: NEGATIVE

## 2021-04-28 RX ORDER — DIPHENOXYLATE HYDROCHLORIDE AND ATROPINE SULFATE 2.5; .025 MG/1; MG/1
TABLET ORAL
Qty: 20 TAB | Refills: 0 | Status: SHIPPED | OUTPATIENT
Start: 2021-04-28 | End: 2021-05-04 | Stop reason: SDUPTHER

## 2021-04-28 NOTE — TELEPHONE ENCOUNTER
Mrs. Noel Chappell went to [address] [facility] Emergency department due to what she thought was a heartattack (she stated this during the phone call). She stated she was suffering from very high blood pressure and they performed a CAT Scan and stated she had gastritis. She stated she has had diarrhea for 2 weeks and Immodium isn't working and she would like to know if there is another medication she can take instead. She has a CPE coming up on 05-04-21 at 8:30. Please call her [phone].     Thank you,  Tavia Smith

## 2021-05-04 ENCOUNTER — OFFICE VISIT (OUTPATIENT)
Dept: INTERNAL MEDICINE CLINIC | Age: 51
End: 2021-05-04
Payer: COMMERCIAL

## 2021-05-04 VITALS
HEIGHT: 60 IN | BODY MASS INDEX: 40.5 KG/M2 | DIASTOLIC BLOOD PRESSURE: 82 MMHG | OXYGEN SATURATION: 98 % | HEART RATE: 86 BPM | TEMPERATURE: 97.6 F | WEIGHT: 206.3 LBS | SYSTOLIC BLOOD PRESSURE: 124 MMHG | RESPIRATION RATE: 16 BRPM

## 2021-05-04 DIAGNOSIS — E55.9 VITAMIN D DEFICIENCY: ICD-10-CM

## 2021-05-04 DIAGNOSIS — L40.9 PSORIASIS: ICD-10-CM

## 2021-05-04 DIAGNOSIS — E78.2 MIXED HYPERLIPIDEMIA: ICD-10-CM

## 2021-05-04 DIAGNOSIS — I10 ESSENTIAL HYPERTENSION: ICD-10-CM

## 2021-05-04 DIAGNOSIS — R19.7 DIARRHEA, UNSPECIFIED TYPE: ICD-10-CM

## 2021-05-04 DIAGNOSIS — R53.83 FATIGUE, UNSPECIFIED TYPE: ICD-10-CM

## 2021-05-04 DIAGNOSIS — Z13.1 SCREENING FOR DIABETES MELLITUS: Primary | ICD-10-CM

## 2021-05-04 DIAGNOSIS — E66.01 CLASS 3 SEVERE OBESITY DUE TO EXCESS CALORIES WITHOUT SERIOUS COMORBIDITY WITH BODY MASS INDEX (BMI) OF 40.0 TO 44.9 IN ADULT (HCC): ICD-10-CM

## 2021-05-04 PROCEDURE — 99396 PREV VISIT EST AGE 40-64: CPT | Performed by: NURSE PRACTITIONER

## 2021-05-04 RX ORDER — SUCRALFATE 1 G/1
1 TABLET ORAL 4 TIMES DAILY
Qty: 120 TAB | Refills: 0 | Status: SHIPPED | OUTPATIENT
Start: 2021-05-04 | End: 2021-05-26

## 2021-05-04 RX ORDER — DIPHENOXYLATE HYDROCHLORIDE AND ATROPINE SULFATE 2.5; .025 MG/1; MG/1
TABLET ORAL
Qty: 120 TAB | Refills: 0 | Status: SHIPPED | OUTPATIENT
Start: 2021-05-04 | End: 2022-04-05

## 2021-05-04 NOTE — PROGRESS NOTES
Tiera Courtney is a 46 y.o. female     Chief Complaint   Patient presents with    Complete Physical       Visit Vitals  /82 (BP 1 Location: Left arm, BP Patient Position: Sitting, BP Cuff Size: Adult)   Pulse 86   Temp 97.6 °F (36.4 °C) (Temporal)   Resp 16   Ht 5' (1.524 m)   Wt 206 lb 4.8 oz (93.6 kg)   LMP 11/01/2012   SpO2 98%   BMI 40.29 kg/m²       Health Maintenance Due   Topic Date Due    COVID-19 Vaccine (1) Never done    PAP AKA CERVICAL CYTOLOGY  Never done    Shingrix Vaccine Age 50> (1 of 2) Never done    Colorectal Cancer Screening Combo  Never done    Breast Cancer Screen Mammogram  01/30/2020       1. Have you been to the ER, urgent care clinic since your last visit? Hospitalized since your last visit? No    2. Have you seen or consulted any other health care providers outside of the [address] [place] [address] since your last visit? Include any pap smears or colon screening.  No

## 2021-05-04 NOTE — PROGRESS NOTES
Subjective:  Ms. Sarahi Bethea is a pleasant 40-year-old lady, who comes in today for her updated history and physical.  Further discussion with her revealed that [facility] [address] with episode of diaphoresis and chest pain. She was taken to the ER via rescue squad and given nitroglycerin and aspirin prior to her arrival in the ER. Her cardiac workup in the ER was negative. She also did give a nine-day history of some diarrhea, for which she was using Imodium, which was no longer helpful. This was not associated with any abdominal pain or nausea and vomiting. While in the ER, she had a CT scan of the abdomen, which was consistent with mild gastritis. She was using ibuprofen three times a day for management of low back pain, so she was asked to discontinue the medication. She was started on Carafate in addition to her Pepcid. She is now using Lomotil and now is only having two stools a day. She already has an appointment set up with Dr. Jenelle Andrade for both colonoscopy and endoscopy. Past Medical History:   Diagnosis Date    Asthma     SYMPTOM-FREE SINCE 2007    Chronic pain     lower back    Depression     TYPE A DEPRESSION    GERD (gastroesophageal reflux disease)     Hypertension     Other ill-defined conditions(929.29)     VITILIGO HANDS,FACE, JOINTS    Psychiatric disorder     DEPRESSION     Past Surgical History:   Procedure Laterality Date    HX CHOLECYSTECTOMY      HX GYN  7/09    NOVASURE ABLATION    HX GYN      D & C    HX HYSTERECTOMY      2012    HX TONSILLECTOMY  AGE 7       Current Outpatient Medications on File Prior to Visit   Medication Sig Dispense Refill    lisinopriL (PRINIVIL, ZESTRIL) 2.5 mg tablet TAKE 1 TABLET BY MOUTH EVERY DAY 90 Tab 3    metaxalone (SKELAXIN) 800 mg tablet Take 800 mg by mouth three (3) times daily.  gabapentin (NEURONTIN) 300 mg capsule Take 300 mg by mouth three (3) times daily.       albuterol (PROVENTIL HFA, VENTOLIN HFA, PROAIR HFA) 90 mcg/actuation inhaler INHALE 2 PUFFS BY MOUTH EVERY 4 HOURS AS NEEDED FOR WHEEZE 8.5 Inhaler 6    buPROPion XL (WELLBUTRIN XL) 300 mg XL tablet TAKE 1 TABLET BY MOUTH EVERY DAY 90 Tab 3    spironolactone (ALDACTONE) 50 mg tablet TAKE 1 TABLET BY MOUTH EVERY DAY 90 Tab 3    famotidine (PEPCID) 40 mg tablet TAKE 1 TABLET BY MOUTH EVERY DAY 90 Tab 3    cholecalciferol, VITAMIN D3, (VITAMIN D3) 5,000 unit tab tablet Take  by mouth daily.  levocetirizine (XYZAL) 5 mg tablet Take  by mouth.  SS-OX-KG-Fe-Min-Lycopen-Lutein (CENTRUM) 0.4-162-18 mg Tab Take  by mouth daily.  [DISCONTINUED] diphenoxylate-atropine (LomotiL) 2.5-0.025 mg per tablet Take one or two tablets 4 times a day as needed for diarrhea not to exceed more then 8 tablets daily 20 Tab 0    [DISCONTINUED] sucralfate (Carafate) 1 gram tablet Take 1 Tab by mouth four (4) times daily. 30 Tab 0    methocarbamol (ROBAXIN) 500 mg tablet Take 500 mg by mouth daily. No current facility-administered medications on file prior to visit. Allergies   Allergen Reactions    Sulfa Dyne Hives and Itching     Past Medical History/Surgeries:    1. Partial hysterectomy. 2. Cholecystectomy. 3. T&A. Illnesses:  1. Hypertension. 2. Chronic depression. 3. GERD. 4. Vitamin D deficiency. 5. Allergic rhinitis. 6. History of Lyme disease in 2013, resulting in arthralgia. Family History:  Father is 76years of age, alive with hypertension. He had sarcoma many years ago, but has done well from that standpoint. Mother is 76years of age, alive with hypertension, diabetes and heart disease. Two siblings are living and well. Social History:  She has remarried in November of 2020. She has two children. Her 25year old son is a type 1 diabetic. 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 Allergies:  Sulfa drug, which has caused a rash. Medications:  1.  Albuterol inhaler, two puffs every four hours as needed for wheezing. 2. Bupropion  mg daily. 3. Vitamin D3 5,000 units daily. 4. Lomotil, 1-2 tablets four times a day as needed for diarrhea. 5. Pepcid 40 mg daily. 6. Neurontin 300 mg three times a day. 7. Xyzal 5 mg daily. 8. Lisinopril 2.5 mg daily. 9. Skelaxin 800 mg daily as needed. 10. Multivitamin daily. 11. Spironolactone 50 mg daily. 12. Carafate 1 gram four times a day. Habits:  Nonsmoker and a non drinker. Review of Systems:  HEENT:  Denies any headaches, dizziness or blurred vision. She does have an upcoming appointment for an eye exam.  CVR:  She has had no further chest pain. She denies any palpitations or syncopal episode. Denies any shortness of breath, cough, wheezing, PND or orthopnea. Does note occasional ankle edema. GI:  Appetite is good, weight is stable. She has had diarrhea in the last two weeks as noted previously, for which she has an upcoming appointment with Dr. Sangeeta Raygoza. :  Denies any urinary symptoms. Nocturia x one. GYN:  She is in need of getting a pelvic and pap, as well as mammogram.    Immunizations:  She has completed her COVID vaccination. She is in need of getting her Shingrix vaccination. Physical Examination:  GENERAL:  Pleasant, obese lady in no acute distress. She is alert and oriented. She answers my questions appropriately. VITALS:  Blood Pressure:  124/82. Pulse:  86. Temperature:  97.6. O2 sat:  98. HEENT:  Normocephalic, atraumatic. PERRLA, EOMI. TMs normal.  Mouth mucosa pink. Tongue midline. Pharynx normal.  NECK:  Supple without adenopathy, thyromegaly or carotid bruits. CHEST:  Lungs clear to auscultation, no rales or wheezes. CV:  Heart regular rhythm without murmur or gallop. ABDOMEN:  Bowel sounds present in four quadrants, soft, non tender, no organomegaly or masses. N CVA tenderness. EXTREMITIES:  No edema or calf tenderness. Distal pulses were present. NEUROLOGIC:  Cranial nerves II-XII intact. Excellent strength in the upper and lower extremities against resistance. Sensation is preserved. Romberg is negative. Reflexes 2+ and symmetrical.  She had excellent coordination. SKIN: She does have vitiligo on hands, face, as well as trunk. Impression:  1. Hypertension. 2. GERD. 3. Chronic depression. 4. Allergic rhinitis. 5. Vitamin D deficiency. 6. Vitiligo. 7. Gastritis. 8. Diarrhea. Plan:  1. She was fasting this morning, so it was opted to do all of her lab results. I will call her as soon as I have the results. 2. I have asked her not to take any NSAID until she has the appointment with Dr. Brenna Hernandez and proceed with endoscopy. 3. She will continue with the Lomotil as needed until she gets a colonoscopy. 4. She will continue with a prudent diet, exercise and weight loss to keep BMI within acceptable level.   5. She is encouraged to get her shingles vaccination, as well as pelvic, pap and mammogram.

## 2021-05-04 NOTE — PATIENT INSTRUCTIONS
Body Mass Index: Care Instructions Your Care Instructions Body mass index (BMI) can help you see if your weight is raising your risk for health problems. It uses a formula to compare how much you weigh with how tall you are. · A BMI lower than 18.5 is considered underweight. · A BMI between 18.5 and 24.9 is considered healthy. · A BMI between 25 and 29.9 is considered overweight. A BMI of 30 or higher is considered obese. If your BMI is in the normal range, it means that you have a lower risk for weight-related health problems. If your BMI is in the overweight or obese range, you may be at increased risk for weight-related health problems, such as high blood pressure, heart disease, stroke, arthritis or joint pain, and diabetes. If your BMI is in the underweight range, you may be at increased risk for health problems such as fatigue, lower protection (immunity) against illness, muscle loss, bone loss, hair loss, and hormone problems. BMI is just one measure of your risk for weight-related health problems. You may be at higher risk for health problems if you are not active, you eat an unhealthy diet, or you drink too much alcohol or use tobacco products. Follow-up care is a key part of your treatment and safety. Be sure to make and go to all appointments, and call your doctor if you are having problems. It's also a good idea to know your test results and keep a list of the medicines you take. How can you care for yourself at home? · Practice healthy eating habits. This includes eating plenty of fruits, vegetables, whole grains, lean protein, and low-fat dairy. · If your doctor recommends it, get more exercise. Walking is a good choice. Bit by bit, increase the amount you walk every day. Try for at least 30 minutes on most days of the week. · Do not smoke. Smoking can increase your risk for health problems. If you need help quitting, talk to your doctor about stop-smoking programs and medicines. These can increase your chances of quitting for good. · Limit alcohol to 2 drinks a day for men and 1 drink a day for women. Too much alcohol can cause health problems. If you have a BMI higher than 25 · Your doctor may do other tests to check your risk for weight-related health problems. This may include measuring the distance around your waist. A waist measurement of more than 40 inches in men or 35 inches in women can increase the risk of weight-related health problems. · Talk with your doctor about steps you can take to stay healthy or improve your health. You may need to make lifestyle changes to lose weight and stay healthy, such as changing your diet and getting regular exercise. If you have a BMI lower than 18.5 · Your doctor may do other tests to check your risk for health problems. · Talk with your doctor about steps you can take to stay healthy or improve your health. You may need to make lifestyle changes to gain or maintain weight and stay healthy, such as getting more healthy foods in your diet and doing exercises to build muscle. Where can you learn more? Go to http://www.thompson.com/ Enter S176 in the search box to learn more about \"Body Mass Index: Care Instructions. \" Current as of: September 23, 2020               Content Version: 12.8 © 2006-2021 [employer]. Care instructions adapted under license by [employer] (which disclaims liability or warranty for this information). If you have questions about a medical condition or this instruction, always ask your healthcare professional. [place] [address] any warranty or liability for your use of this information.

## 2021-05-06 ENCOUNTER — TELEPHONE (OUTPATIENT)
Dept: INTERNAL MEDICINE CLINIC | Age: 51
End: 2021-05-06

## 2021-05-06 NOTE — TELEPHONE ENCOUNTER
Okay 2 things     1) Ms. Teri Bradley stated that she could not go to the dermatologist tomorrow so she rescheduled, but the soonest they could get her in is in July. She stated she can not wait that long. Is it possible to refer her to another dermatologist?    2) She wanted to know if [facility] can refill her medication. Betamethasone Dipropionate 0.05% 2 times daily. Apply in the morning and in the evening with gloves. Please call and advise.     Thank you,  Shirley Cohn

## 2021-05-21 RX ORDER — BETAMETHASONE DIPROPIONATE 0.5 MG/G
CREAM TOPICAL 2 TIMES DAILY
Qty: 15 G | Refills: 0 | Status: SHIPPED | OUTPATIENT
Start: 2021-05-21

## 2021-05-26 RX ORDER — SUCRALFATE 1 G/1
TABLET ORAL
Qty: 120 TABLET | Refills: 0 | Status: SHIPPED | OUTPATIENT
Start: 2021-05-26 | End: 2021-06-21

## 2021-06-21 RX ORDER — SUCRALFATE 1 G/1
TABLET ORAL
Qty: 120 TABLET | Refills: 0 | Status: SHIPPED | OUTPATIENT
Start: 2021-06-21 | End: 2021-07-23

## 2021-07-23 RX ORDER — SUCRALFATE 1 G/1
TABLET ORAL
Qty: 120 TABLET | Refills: 0 | Status: SHIPPED | OUTPATIENT
Start: 2021-07-23 | End: 2022-04-05

## 2021-10-09 ENCOUNTER — OFFICE VISIT (OUTPATIENT)
Dept: URGENT CARE | Age: 51
End: 2021-10-09
Payer: COMMERCIAL

## 2021-10-09 VITALS — HEART RATE: 89 BPM | TEMPERATURE: 98.5 F | OXYGEN SATURATION: 99 % | RESPIRATION RATE: 17 BRPM

## 2021-10-09 DIAGNOSIS — R05.9 COUGH: ICD-10-CM

## 2021-10-09 DIAGNOSIS — R06.2 WHEEZING: ICD-10-CM

## 2021-10-09 DIAGNOSIS — R09.89 CHEST CONGESTION: Primary | ICD-10-CM

## 2021-10-09 PROCEDURE — 99213 OFFICE O/P EST LOW 20 MIN: CPT | Performed by: NURSE PRACTITIONER

## 2021-10-09 RX ORDER — ALBUTEROL SULFATE 90 UG/1
2 AEROSOL, METERED RESPIRATORY (INHALATION)
Qty: 1 EACH | Refills: 0 | Status: SHIPPED | OUTPATIENT
Start: 2021-10-09 | End: 2022-04-05

## 2021-10-09 RX ORDER — AZITHROMYCIN 250 MG/1
TABLET, FILM COATED ORAL
Qty: 6 TABLET | Refills: 0 | Status: SHIPPED | OUTPATIENT
Start: 2021-10-09 | End: 2021-10-14

## 2021-10-09 NOTE — PROGRESS NOTES
OFFICE NOTE    Deshaun Alfred is a 46 y.o. female presenting today for the following:  Chief Complaint   Patient presents with    Covid Testing     Patient states she believes that she may have been exposed last weekend when with friends; patient states that she presented with symptoms of sore throat, cough, congestion, and ear ache since Wednesday      HPI     Patient is presenting to  due to possible exposure to [place] from [place] last weekend. The camp ground have a out break going on of covid. She is having symptoms of sore throat, cough, congestion and ear ache. She states she does have a history of bronchitis. She states she feels tight and congested in her chest.  Patient denies SOB or chest pain. Review of Systems   Constitutional: Negative for chills, fatigue and fever. HENT: Positive for congestion, ear pain (fullness), postnasal drip, rhinorrhea and sinus pressure. Negative for trouble swallowing and voice change. Eyes: Negative. Respiratory: Positive for cough, chest tightness and wheezing. Negative for shortness of breath. Cardiovascular: Negative for chest pain and palpitations. Neurological: Negative for dizziness, light-headedness and headaches.          History  Past Medical History:   Diagnosis Date    Asthma     SYMPTOM-FREE SINCE 2007    Chronic pain     lower back    Depression     TYPE A DEPRESSION    GERD (gastroesophageal reflux disease)     Hypertension     Other ill-defined conditions(799.89)     VITILIGO HANDS,FACE, JOINTS    Psychiatric disorder     DEPRESSION       Past Surgical History:   Procedure Laterality Date    HX CHOLECYSTECTOMY      HX GYN  7/09    NOVASURE ABLATION    HX GYN      D & C    HX HYSTERECTOMY      2012    HX TONSILLECTOMY  AGE 7       Social History     Socioeconomic History    Marital status:      Spouse name: Not on file    Number of children: Not on file    Years of education: Not on file    Highest education level: Not on file   Occupational History    Not on file   Tobacco Use    Smoking status: Never Smoker    Smokeless tobacco: Never Used   Substance and Sexual Activity    Alcohol use: Yes     Alcohol/week: 3.0 standard drinks     Types: 3 Shots of liquor per week     Comment: on saturdays    Drug use: No    Sexual activity: Yes     Partners: Male     Birth control/protection: Surgical   Other Topics Concern    Not on file   Social History Narrative    Not on file     Social Determinants of Health     Financial Resource Strain:     Difficulty of Paying Living Expenses:    Food Insecurity:     Worried About Running Out of Food in the Last Year:     [address] in the Last Year:    Transportation Needs:     Lack of Transportation (Medical):  Lack of Transportation (Non-Medical):    Physical Activity:     Days of Exercise per Week:     Minutes of Exercise per Session:    Stress:     Feeling of Stress :    Social Connections:     Frequency of Communication with Friends and Family:     Frequency of Social Gatherings with Friends and Family:     Attends Jew Services:     Active Member of Clubs or Organizations:     Attends Club or Organization Meetings:     Marital Status:    Intimate Partner Violence:     Fear of Current or Ex-Partner:     Emotionally Abused:     Physically Abused:     Sexually Abused: Allergies   Allergen Reactions    Sulfa Dyne Hives and Itching       Current Outpatient Medications   Medication Sig Dispense Refill    albuterol (PROVENTIL HFA, VENTOLIN HFA, PROAIR HFA) 90 mcg/actuation inhaler Take 2 Puffs by inhalation every four (4) hours as needed for Wheezing. 1 Each 0    azithromycin (ZITHROMAX) 250 mg tablet Take 2 tablets today, then take 1 tablet daily 6 Tablet 0    augmented betamethasone dipropionate (DIPROLENE-AF) 0.05 % topical cream Apply  to affected area two (2) times a day.  15 g 0    diphenoxylate-atropine (LomotiL) 2.5-0.025 mg per tablet Take one or two tablets 4 times a day as needed for diarrhea not to exceed more then 8 tablets daily 120 Tab 0    lisinopriL (PRINIVIL, ZESTRIL) 2.5 mg tablet TAKE 1 TABLET BY MOUTH EVERY DAY 90 Tab 3    metaxalone (SKELAXIN) 800 mg tablet Take 800 mg by mouth three (3) times daily.  buPROPion XL (WELLBUTRIN XL) 300 mg XL tablet TAKE 1 TABLET BY MOUTH EVERY DAY 90 Tab 3    spironolactone (ALDACTONE) 50 mg tablet TAKE 1 TABLET BY MOUTH EVERY DAY 90 Tab 3    famotidine (PEPCID) 40 mg tablet TAKE 1 TABLET BY MOUTH EVERY DAY 90 Tab 3    cholecalciferol, VITAMIN D3, (VITAMIN D3) 5,000 unit tab tablet Take  by mouth daily.  levocetirizine (XYZAL) 5 mg tablet Take  by mouth.  sucralfate (CARAFATE) 1 gram tablet TAKE 1 TABLET BY MOUTH FOUR TIMES A DAY (Patient not taking: Reported on 10/9/2021) 120 Tablet 0    gabapentin (NEURONTIN) 300 mg capsule Take 300 mg by mouth three (3) times daily. (Patient not taking: Reported on 10/9/2021)      methocarbamol (ROBAXIN) 500 mg tablet Take 500 mg by mouth daily.  XL-XR-WZ-Fe-Min-Lycopen-Lutein (CENTRUM) 0.4-162-18 mg Tab Take  by mouth daily. Patient Care Team:  Patient Care Team:  Lowry Cranker, NP as PCP - General (Nurse Practitioner)  Lowry Cranker, NP as PCP - [address] [place], [place] [address]jerri Mendez Provider      LABS:  No results found for any visits on 10/09/21. RADIOLOGY:  No recent results      Physical Exam  Vitals and nursing note reviewed. Constitutional:       Appearance: Normal appearance. She is well-developed. HENT:      Right Ear: Tympanic membrane normal. There is no impacted cerumen. Left Ear: Tympanic membrane normal. There is no impacted cerumen. Nose: Congestion and rhinorrhea present. Mouth/Throat:      Pharynx: No oropharyngeal exudate or posterior oropharyngeal erythema. Cardiovascular:      Rate and Rhythm: Normal rate and regular rhythm. Heart sounds: Normal heart sounds. Pulmonary:      Effort: Pulmonary effort is normal. No respiratory distress. Breath sounds: Wheezing present. No rales. Musculoskeletal:         General: Normal range of motion. Cervical back: Normal range of motion and neck supple. Lymphadenopathy:      Cervical: No cervical adenopathy. Skin:     General: Skin is warm and dry. Neurological:      Mental Status: She is alert and oriented to person, place, and time. Deep Tendon Reflexes: Reflexes are normal and symmetric. Psychiatric:         Mood and Affect: Mood normal.           Vitals:    10/09/21 1109   Pulse: 89   Resp: 17   Temp: 98.5 °F (36.9 °C)   SpO2: 99%   LMP: 11/01/2012         Assessment and Plan    1. Chest congestion    - NOVEL CORONAVIRUS (COVID-19)  - albuterol (PROVENTIL HFA, VENTOLIN HFA, PROAIR HFA) 90 mcg/actuation inhaler; Take 2 Puffs by inhalation every four (4) hours as needed for Wheezing. Dispense: 1 Each; Refill: 0  - azithromycin (ZITHROMAX) 250 mg tablet; Take 2 tablets today, then take 1 tablet daily  Dispense: 6 Tablet; Refill: 0    2. Wheezing    - NOVEL CORONAVIRUS (COVID-19)  - albuterol (PROVENTIL HFA, VENTOLIN HFA, PROAIR HFA) 90 mcg/actuation inhaler; Take 2 Puffs by inhalation every four (4) hours as needed for Wheezing. Dispense: 1 Each; Refill: 0  - azithromycin (ZITHROMAX) 250 mg tablet; Take 2 tablets today, then take 1 tablet daily  Dispense: 6 Tablet; Refill: 0    3. Cough    - NOVEL CORONAVIRUS (COVID-19)  - azithromycin (ZITHROMAX) 250 mg tablet; Take 2 tablets today, then take 1 tablet daily  Dispense: 6 Tablet; Refill: 0      MDM    Procedures      *Plan of care reviewed with patient. Patient in agreement with plan and expresses understanding. All questions answered and patient encouraged to call or RTO if further questions or concerns. Advised patient if symptoms worsen to go to nearest ER or call 911. AVS and recommendations given to patient upon discharge.

## 2021-10-11 LAB
SARS-COV-2, NAA 2 DAY TAT: NORMAL
SARS-COV-2, NAA: NOT DETECTED

## 2021-11-09 ENCOUNTER — OFFICE VISIT (OUTPATIENT)
Dept: INTERNAL MEDICINE CLINIC | Age: 51
End: 2021-11-09
Payer: COMMERCIAL

## 2021-11-09 VITALS
WEIGHT: 214.7 LBS | HEIGHT: 60 IN | RESPIRATION RATE: 16 BRPM | TEMPERATURE: 97.3 F | SYSTOLIC BLOOD PRESSURE: 128 MMHG | DIASTOLIC BLOOD PRESSURE: 78 MMHG | HEART RATE: 77 BPM | BODY MASS INDEX: 42.15 KG/M2 | OXYGEN SATURATION: 98 %

## 2021-11-09 DIAGNOSIS — I10 ESSENTIAL HYPERTENSION: Primary | ICD-10-CM

## 2021-11-09 DIAGNOSIS — K21.9 GASTROESOPHAGEAL REFLUX DISEASE WITHOUT ESOPHAGITIS: ICD-10-CM

## 2021-11-09 DIAGNOSIS — R00.2 PALPITATIONS: ICD-10-CM

## 2021-11-09 DIAGNOSIS — R53.83 FATIGUE, UNSPECIFIED TYPE: ICD-10-CM

## 2021-11-09 DIAGNOSIS — E66.01 CLASS 3 SEVERE OBESITY DUE TO EXCESS CALORIES WITHOUT SERIOUS COMORBIDITY WITH BODY MASS INDEX (BMI) OF 40.0 TO 44.9 IN ADULT (HCC): ICD-10-CM

## 2021-11-09 DIAGNOSIS — L80 VITILIGO: ICD-10-CM

## 2021-11-09 LAB
ANION GAP SERPL CALC-SCNC: 4 MMOL/L (ref 5–15)
BASOPHILS # BLD: 0.1 K/UL (ref 0–0.1)
BASOPHILS NFR BLD: 1 % (ref 0–1)
BUN SERPL-MCNC: 18 MG/DL (ref 6–20)
BUN/CREAT SERPL: 19 (ref 12–20)
CALCIUM SERPL-MCNC: 9.4 MG/DL (ref 8.5–10.1)
CHLORIDE SERPL-SCNC: 107 MMOL/L (ref 97–108)
CO2 SERPL-SCNC: 28 MMOL/L (ref 21–32)
CREAT SERPL-MCNC: 0.96 MG/DL (ref 0.55–1.02)
DIFFERENTIAL METHOD BLD: NORMAL
EOSINOPHIL # BLD: 0.4 K/UL (ref 0–0.4)
EOSINOPHIL NFR BLD: 5 % (ref 0–7)
ERYTHROCYTE [DISTWIDTH] IN BLOOD BY AUTOMATED COUNT: 12.9 % (ref 11.5–14.5)
GLUCOSE SERPL-MCNC: 85 MG/DL (ref 65–100)
HCT VFR BLD AUTO: 43.4 % (ref 35–47)
HGB BLD-MCNC: 14.2 G/DL (ref 11.5–16)
IMM GRANULOCYTES # BLD AUTO: 0 K/UL (ref 0–0.04)
IMM GRANULOCYTES NFR BLD AUTO: 0 % (ref 0–0.5)
LYMPHOCYTES # BLD: 1.8 K/UL (ref 0.8–3.5)
LYMPHOCYTES NFR BLD: 23 % (ref 12–49)
MCH RBC QN AUTO: 30.9 PG (ref 26–34)
MCHC RBC AUTO-ENTMCNC: 32.7 G/DL (ref 30–36.5)
MCV RBC AUTO: 94.6 FL (ref 80–99)
MONOCYTES # BLD: 0.6 K/UL (ref 0–1)
MONOCYTES NFR BLD: 8 % (ref 5–13)
NEUTS SEG # BLD: 4.9 K/UL (ref 1.8–8)
NEUTS SEG NFR BLD: 63 % (ref 32–75)
NRBC # BLD: 0 K/UL (ref 0–0.01)
NRBC BLD-RTO: 0 PER 100 WBC
PLATELET # BLD AUTO: 240 K/UL (ref 150–400)
PMV BLD AUTO: 10.2 FL (ref 8.9–12.9)
POTASSIUM SERPL-SCNC: 4.6 MMOL/L (ref 3.5–5.1)
RBC # BLD AUTO: 4.59 M/UL (ref 3.8–5.2)
SODIUM SERPL-SCNC: 139 MMOL/L (ref 136–145)
T4 FREE SERPL-MCNC: 0.9 NG/DL (ref 0.8–1.5)
TSH SERPL DL<=0.05 MIU/L-ACNC: 1.39 UIU/ML (ref 0.36–3.74)
WBC # BLD AUTO: 7.8 K/UL (ref 3.6–11)

## 2021-11-09 PROCEDURE — 99213 OFFICE O/P EST LOW 20 MIN: CPT | Performed by: NURSE PRACTITIONER

## 2021-11-09 RX ORDER — PREGABALIN 100 MG/1
100 CAPSULE ORAL 2 TIMES DAILY
COMMUNITY
Start: 2021-10-28 | End: 2022-05-26 | Stop reason: SDUPTHER

## 2021-11-09 RX ORDER — OMEPRAZOLE 40 MG/1
40 CAPSULE, DELAYED RELEASE ORAL
COMMUNITY
Start: 2021-10-18 | End: 2022-05-26 | Stop reason: SDUPTHER

## 2021-11-09 NOTE — PROGRESS NOTES
Deshaun Alfred is a 46 y.o. female     Chief Complaint   Patient presents with    Pain (Chronic)     6 month follow up       Visit Vitals  /78 (BP 1 Location: Left arm, BP Patient Position: Sitting, BP Cuff Size: Adult)   Pulse 77   Temp 97.3 °F (36.3 °C) (Temporal)   Resp 16   Ht 5' (1.524 m)   Wt 214 lb 11.2 oz (97.4 kg)   LMP 11/01/2012   SpO2 98%   BMI 41.93 kg/m²       Health Maintenance Due   Topic Date Due    COVID-19 Vaccine (1) 01[birth year]30/1982    Colorectal Cancer Screening Combo  Never done    Shingrix Vaccine Age 50> (1 of 2) Never done    Breast Cancer Screen Mammogram  01/30/2020       1. Have you been to the ER, urgent care clinic since your last visit? Hospitalized since your last visit? No     2. Have you seen or consulted any other health care providers outside of the [address] [place] [address] since your last visit? Include any pap smears or colon screening.  No

## 2021-11-09 NOTE — PROGRESS NOTES
Subjective:  Ms. Claudia Jeff is a pleasant 55-year-old lady, who comes in today for six month follow up of her medical problems. 1. Palpitations. Further discussion with her revealed that in the last two weeks intermittently she has had some episodes of feeling as if her heart was racing. This lasted for maybe 2-3 minutes. This was not associated with any chest pain. Denies any syncopal episode. This was not associated with shortness of breath, nausea or diaphoresis. She did have one more episode yesterday that only lasted for a few minutes. She denies any previous history of palpitations. She tells me that she was started on Lyrica about three weeks ago, but had no problem with the medication in the first week. 2. History of diarrhea, for which she underwent a full GI workup with Dr. Makenzie Zhao. She tells me that she had an endoscopy, as well as colonoscopy, which were both normal. She tells me that after she came off Gabapentin, all of her symptoms resolved. Unfortunately, I do not have any report of these studies. Her appetite has remained good, stools are back to normal without presence of blood or melena. 3. Hypertension, managed on Lisinopril 2.5 mg and Spironolactone 50 mg daily. She denies any side effects from the medication. She denies headaches, dizziness or blurred vision. 4. Chronic depression, managed on Wellbutrin 300 mg daily. She is doing well from that standpoint.     Past Medical History:   Diagnosis Date    Asthma     SYMPTOM-FREE SINCE 2007    Chronic pain     lower back    Depression     TYPE A DEPRESSION    GERD (gastroesophageal reflux disease)     Hypertension     Other ill-defined conditions(289.33)     VITILIGO HANDS,FACE, JOINTS    Psychiatric disorder     DEPRESSION     Past Surgical History:   Procedure Laterality Date    HX CHOLECYSTECTOMY      HX GYN  7/09    NOVASURE ABLATION    HX GYN      D & C    HX HYSTERECTOMY      2012    HX TONSILLECTOMY  AGE 7 Current Outpatient Medications on File Prior to Visit   Medication Sig Dispense Refill    pregabalin (LYRICA) 100 mg capsule       omeprazole (PRILOSEC) 40 mg capsule 40 mg. Every other day      albuterol (PROVENTIL HFA, VENTOLIN HFA, PROAIR HFA) 90 mcg/actuation inhaler Take 2 Puffs by inhalation every four (4) hours as needed for Wheezing. 1 Each 0    augmented betamethasone dipropionate (DIPROLENE-AF) 0.05 % topical cream Apply  to affected area two (2) times a day. 15 g 0    lisinopriL (PRINIVIL, ZESTRIL) 2.5 mg tablet TAKE 1 TABLET BY MOUTH EVERY DAY 90 Tab 3    metaxalone (SKELAXIN) 800 mg tablet Take 800 mg by mouth three (3) times daily.  buPROPion XL (WELLBUTRIN XL) 300 mg XL tablet TAKE 1 TABLET BY MOUTH EVERY DAY 90 Tab 3    spironolactone (ALDACTONE) 50 mg tablet TAKE 1 TABLET BY MOUTH EVERY DAY 90 Tab 3    famotidine (PEPCID) 40 mg tablet TAKE 1 TABLET BY MOUTH EVERY DAY 90 Tab 3    cholecalciferol, VITAMIN D3, (VITAMIN D3) 5,000 unit tab tablet Take  by mouth daily.  levocetirizine (XYZAL) 5 mg tablet Take  by mouth.  DO-ZW-TG-Fe-Min-Lycopen-Lutein (CENTRUM) 0.4-162-18 mg Tab Take  by mouth daily.  sucralfate (CARAFATE) 1 gram tablet TAKE 1 TABLET BY MOUTH FOUR TIMES A DAY (Patient not taking: Reported on 10/9/2021) 120 Tablet 0    diphenoxylate-atropine (LomotiL) 2.5-0.025 mg per tablet Take one or two tablets 4 times a day as needed for diarrhea not to exceed more then 8 tablets daily (Patient not taking: Reported on 11/9/2021) 120 Tab 0    gabapentin (NEURONTIN) 300 mg capsule Take 300 mg by mouth three (3) times daily. (Patient not taking: Reported on 10/9/2021)      methocarbamol (ROBAXIN) 500 mg tablet Take 500 mg by mouth daily. No current facility-administered medications on file prior to visit. Allergies   Allergen Reactions    Sulfa Dyne Hives and Itching     Physical Examination:  GENERAL:  Pleasant lady in no acute distress.   She is alert and oriented. She answers my questions appropriately. VITALS:  Blood Pressure:  128/78. Pulse:  77.  Respirations:  16.  Temperature:  97.3. O2 sat:  98. HEENT:  Normocephalic, atraumatic. PERRLA, EOMI. TMs normal.  Mouth mucosa pink. Tongue midline. Pharynx normal.  NECK:  Supple without adenopathy, thyromegaly or carotid bruits. CHEST:  Lungs clear to auscultation, no rales or wheezes. CV:  Heart regular rhythm without murmur or gallop. EXTREMITIES:  No edema or calf tenderness. Distal pulses were present. Impression:  1. Palpitations. 2. Hypertension, stable. 3. GERD. 4. Chronic depression. 5. Vitamin D deficiency. 6. Allergic rhinitis. 7. History of Lyme disease in 2013, resulting in arthralgia. 8. Vitiligo. Plan:  1. In terms of her palpitations, it was opted to refer her for a 24-hour Holter monitor. I will call her as soon as I have these results. 2. While in the office today, we went ahead and did a CBC, comprehensive metabolic, as well as thyroid function. I will call her as soon as I have her results. 3. Otherwise she will continue with her current regimen and I will continue to see her every six months.

## 2021-11-09 NOTE — PATIENT INSTRUCTIONS
Body Mass Index: Care Instructions  Your Care Instructions     Body mass index (BMI) can help you see if your weight is raising your risk for health problems. It uses a formula to compare how much you weigh with how tall you are. · A BMI lower than 18.5 is considered underweight. · A BMI between 18.5 and 24.9 is considered healthy. · A BMI between 25 and 29.9 is considered overweight. A BMI of 30 or higher is considered obese. If your BMI is in the normal range, it means that you have a lower risk for weight-related health problems. If your BMI is in the overweight or obese range, you may be at increased risk for weight-related health problems, such as high blood pressure, heart disease, stroke, arthritis or joint pain, and diabetes. If your BMI is in the underweight range, you may be at increased risk for health problems such as fatigue, lower protection (immunity) against illness, muscle loss, bone loss, hair loss, and hormone problems. BMI is just one measure of your risk for weight-related health problems. You may be at higher risk for health problems if you are not active, you eat an unhealthy diet, or you drink too much alcohol or use tobacco products. Follow-up care is a key part of your treatment and safety. Be sure to make and go to all appointments, and call your doctor if you are having problems. It's also a good idea to know your test results and keep a list of the medicines you take. How can you care for yourself at home? · Practice healthy eating habits. This includes eating plenty of fruits, vegetables, whole grains, lean protein, and low-fat dairy. · If your doctor recommends it, get more exercise. Walking is a good choice. Bit by bit, increase the amount you walk every day. Try for at least 30 minutes on most days of the week. · Do not smoke. Smoking can increase your risk for health problems. If you need help quitting, talk to your doctor about stop-smoking programs and medicines. These can increase your chances of quitting for good. · Limit alcohol to 2 drinks a day for men and 1 drink a day for women. Too much alcohol can cause health problems. If you have a BMI higher than 25  · Your doctor may do other tests to check your risk for weight-related health problems. This may include measuring the distance around your waist. A waist measurement of more than 40 inches in men or 35 inches in women can increase the risk of weight-related health problems. · Talk with your doctor about steps you can take to stay healthy or improve your health. You may need to make lifestyle changes to lose weight and stay healthy, such as changing your diet and getting regular exercise. If you have a BMI lower than 18.5  · Your doctor may do other tests to check your risk for health problems. · Talk with your doctor about steps you can take to stay healthy or improve your health. You may need to make lifestyle changes to gain or maintain weight and stay healthy, such as getting more healthy foods in your diet and doing exercises to build muscle. Where can you learn more? Go to http://www.thompson.com/  Enter S176 in the search box to learn more about \"Body Mass Index: Care Instructions. \"  Current as of: March 17, 2021               Content Version: 13.0  © 2006-2021 [employer]. Care instructions adapted under license by [employer] (which disclaims liability or warranty for this information). If you have questions about a medical condition or this instruction, always ask your healthcare professional. [place] [address] any warranty or liability for your use of this information.

## 2021-11-16 NOTE — PROGRESS NOTES
Lab results discussed with patient. All normal. Schedule for Holter monitor tomorrow. I will call her with results next week.

## 2021-11-17 ENCOUNTER — HOSPITAL ENCOUNTER (OUTPATIENT)
Dept: NON INVASIVE DIAGNOSTICS | Age: 51
Discharge: HOME | End: 2021-11-17
Attending: NURSE PRACTITIONER
Payer: COMMERCIAL

## 2021-11-17 DIAGNOSIS — R00.2 PALPITATIONS: ICD-10-CM

## 2021-11-17 PROCEDURE — 93225 XTRNL ECG REC<48 HRS REC: CPT

## 2021-11-23 PROCEDURE — 93227 XTRNL ECG REC<48 HR R&I: CPT | Performed by: SPECIALIST

## 2021-12-01 ENCOUNTER — TELEPHONE (OUTPATIENT)
Dept: CARDIOLOGY CLINIC | Age: 51
End: 2021-12-01

## 2021-12-01 NOTE — TELEPHONE ENCOUNTER
Left VM for pt to call to schedule an appointment with  Referral from PCP scanned in.     Thanks  Lencho Lopez

## 2021-12-23 DIAGNOSIS — I10 ESSENTIAL HYPERTENSION: ICD-10-CM

## 2021-12-23 RX ORDER — FAMOTIDINE 40 MG/1
40 TABLET, FILM COATED ORAL DAILY
Qty: 90 TABLET | Refills: 3 | Status: SHIPPED | OUTPATIENT
Start: 2021-12-23

## 2021-12-23 RX ORDER — BUPROPION HYDROCHLORIDE 300 MG/1
300 TABLET ORAL DAILY
Qty: 90 TABLET | Refills: 3 | Status: SHIPPED | OUTPATIENT
Start: 2021-12-23 | End: 2022-05-26 | Stop reason: SDUPTHER

## 2021-12-23 RX ORDER — SPIRONOLACTONE 50 MG/1
50 TABLET, FILM COATED ORAL DAILY
Qty: 90 TABLET | Refills: 3 | Status: SHIPPED | OUTPATIENT
Start: 2021-12-23 | End: 2022-05-26 | Stop reason: SDUPTHER

## 2021-12-23 RX ORDER — LISINOPRIL 2.5 MG/1
2.5 TABLET ORAL DAILY
Qty: 90 TABLET | Refills: 3 | Status: SHIPPED | OUTPATIENT
Start: 2021-12-23 | End: 2022-05-26 | Stop reason: SDUPTHER

## 2021-12-23 NOTE — TELEPHONE ENCOUNTER
PCP: Duncan Angelo NP    Last appt: 11/9/2021  Future Appointments   Date Time Provider Mariangel Pereira   1/19/2022  9:20 AM Katiuska Shin MD [facility]       Requested Prescriptions     Pending Prescriptions Disp Refills    buPROPion XL (WELLBUTRIN XL) 300 mg XL tablet 90 Tablet 3     Sig: Take 1 Tablet by mouth daily.  spironolactone (ALDACTONE) 50 mg tablet 90 Tablet 3     Sig: Take 1 Tablet by mouth daily.  lisinopriL (PRINIVIL, ZESTRIL) 2.5 mg tablet 90 Tablet 3     Sig: Take 1 Tablet by mouth daily.  famotidine (PEPCID) 40 mg tablet 90 Tablet 3     Sig: Take 1 Tablet by mouth daily.        Prior labs and Blood pressures:  BP Readings from Last 3 Encounters:   11/09/21 128/78   05/04/21 124/82   04/27/21 139/89     Lab Results   Component Value Date/Time    Sodium 139 11/09/2021 10:23 AM    Potassium 4.6 11/09/2021 10:23 AM    Chloride 107 11/09/2021 10:23 AM    CO2 28 11/09/2021 10:23 AM    Anion gap 4 (L) 11/09/2021 10:23 AM    Glucose 85 11/09/2021 10:23 AM    BUN 18 11/09/2021 10:23 AM    Creatinine 0.96 11/09/2021 10:23 AM    BUN/Creatinine ratio 19 11/09/2021 10:23 AM    GFR est AA >60 11/09/2021 10:23 AM    GFR est non-AA >60 11/09/2021 10:23 AM    Calcium 9.4 11/09/2021 10:23 AM     Lab Results   Component Value Date/Time    Hemoglobin A1c 5.3 03/02/2021 10:43 AM     Lab Results   Component Value Date/Time    Cholesterol, total 182 03/02/2021 10:42 AM    HDL Cholesterol 59 03/02/2021 10:42 AM    LDL, calculated 106 03/02/2021 10:42 AM    VLDL, calculated 13 08/08/2018 10:50 AM    VLDL 17 03/02/2021 10:42 AM    Triglyceride 87 03/02/2021 10:42 AM    CHOL/HDL Ratio 3 03/02/2021 10:42 AM     Lab Results   Component Value Date/Time    VITAMIN D, 25-HYDROXY 57 03/02/2021 10:42 AM       Lab Results   Component Value Date/Time    TSH 1.39 11/09/2021 10:23 AM    TSH, 3rd generation 1.51 03/02/2021 10:42 AM

## 2022-01-19 ENCOUNTER — OFFICE VISIT (OUTPATIENT)
Dept: CARDIOLOGY CLINIC | Age: 52
End: 2022-01-19
Payer: COMMERCIAL

## 2022-01-19 VITALS
BODY MASS INDEX: 43.62 KG/M2 | HEART RATE: 75 BPM | DIASTOLIC BLOOD PRESSURE: 86 MMHG | HEIGHT: 60 IN | WEIGHT: 222.2 LBS | OXYGEN SATURATION: 98 % | SYSTOLIC BLOOD PRESSURE: 130 MMHG | RESPIRATION RATE: 18 BRPM

## 2022-01-19 DIAGNOSIS — R00.2 PALPITATIONS: Primary | ICD-10-CM

## 2022-01-19 PROCEDURE — 93000 ELECTROCARDIOGRAM COMPLETE: CPT | Performed by: INTERNAL MEDICINE

## 2022-01-19 PROCEDURE — 99203 OFFICE O/P NEW LOW 30 MIN: CPT | Performed by: INTERNAL MEDICINE

## 2022-01-19 NOTE — PROGRESS NOTES
1. Have you been to the ER, urgent care clinic since your last visit? Hospitalized since your last visit? Yes, 4/27/21, ED, [facility]    2. Have you seen or consulted any other health care providers outside of the [address] [place] [address] since your last visit? Include any pap smears or colon screening.  No         Chief Complaint   Patient presents with    Palpitations     C/O  Had Palpitations last had in December

## 2022-01-20 NOTE — PROGRESS NOTES
r                   Subjective/HPI:     Irvin Hudson is a 46 y.o. female is here to establish care. She has a PMHx of depression, obesity. Noted palpitations a month ago. holter showed single PVC's. Since then she stopped her stimulants. Palpitations have resolved. She denies any chest pain, SOB. Does not smoke. Drinks several sodas a day. PCP Provider  Junie Redmond NP    Past Medical History:   Diagnosis Date    Asthma     SYMPTOM-FREE SINCE 2007    Chronic pain     lower back    Depression     TYPE A DEPRESSION    GERD (gastroesophageal reflux disease)     Hypertension     Other ill-defined conditions(799.89)     VITILIGO HANDS,FACE, JOINTS    Psychiatric disorder     DEPRESSION        Past Surgical History:   Procedure Laterality Date    HX CHOLECYSTECTOMY      HX GYN  7/09    NOVASURE ABLATION    HX GYN      D & C    HX HYSTERECTOMY      2012    HX TONSILLECTOMY  AGE 7        family history includes Cancer in her father and paternal grandfather; Diabetes in her mother; Heart Attack in her maternal grandfather; Heart Disease in her mother; Hypertension in her father and mother; Other in her mother; Thyroid Disease in her mother; Ulcerative Colitis in her son. Social History     Socioeconomic History    Marital status:      Spouse name: Not on file    Number of children: Not on file    Years of education: Not on file    Highest education level: Not on file   Occupational History    Not on file   Tobacco Use    Smoking status: Never Smoker    Smokeless tobacco: Never Used   Vaping Use    Vaping Use: Never used   Substance and Sexual Activity    Alcohol use:  Yes     Alcohol/week: 3.0 standard drinks     Types: 3 Shots of liquor per week     Comment: on saturdays    Drug use: No    Sexual activity: Yes     Partners: Male     Birth control/protection: Surgical   Other Topics Concern    Not on file   Social History Narrative    Not on file     Social Determinants of Health     Financial Resource Strain:     Difficulty of Paying Living Expenses: Not on file   Food Insecurity:     Worried About Running Out of Food in the Last Year: Not on file    [place] of Food in the Last Year: Not on file   Transportation Needs:     Lack of Transportation (Medical): Not on file    Lack of Transportation (Non-Medical): Not on file   Physical Activity:     Days of Exercise per Week: Not on file    Minutes of Exercise per Session: Not on file   Stress:     Feeling of Stress : Not on file   Social Connections:     Frequency of Communication with Friends and Family: Not on file    Frequency of Social Gatherings with Friends and Family: Not on file    Attends Anabaptist Services: Not on file    Active Member of [address] [place] [address] [employer] or Organizations: Not on file    Attends Club or Organization Meetings: Not on file    Marital Status: Not on file   Intimate Partner Violence:     Fear of Current or Ex-Partner: Not on file    Emotionally Abused: Not on file    Physically Abused: Not on file    Sexually Abused: Not on file   Housing Stability:     Unable to Pay for Housing in the Last Year: Not on file    Number of [place] in the Last Year: Not on file    Unstable Housing in the Last Year: Not on file       Allergies   Allergen Reactions    Sulfa (Sulfonamide Antibiotics) Hives    Sulfa Dyne Hives and Itching        Outpatient Encounter Medications as of 1/19/2022   Medication Sig Dispense Refill    buPROPion XL (WELLBUTRIN XL) 300 mg XL tablet Take 1 Tablet by mouth daily. 90 Tablet 3    spironolactone (ALDACTONE) 50 mg tablet Take 1 Tablet by mouth daily. 90 Tablet 3    lisinopriL (PRINIVIL, ZESTRIL) 2.5 mg tablet Take 1 Tablet by mouth daily. 90 Tablet 3    famotidine (PEPCID) 40 mg tablet Take 1 Tablet by mouth daily. 90 Tablet 3    pregabalin (LYRICA) 100 mg capsule Take 100 mg by mouth two (2) times a day.  omeprazole (PRILOSEC) 40 mg capsule 40 mg.  Every other day      augmented betamethasone dipropionate (DIPROLENE-AF) 0.05 % topical cream Apply  to affected area two (2) times a day. 15 g 0    metaxalone (SKELAXIN) 800 mg tablet Take 800 mg by mouth two (2) times a day.  cholecalciferol, VITAMIN D3, (VITAMIN D3) 5,000 unit tab tablet Take  by mouth daily.  levocetirizine (XYZAL) 5 mg tablet Take  by mouth.  JB-IC-PG-Fe-Min-Lycopen-Lutein (CENTRUM) 0.4-162-18 mg Tab Take  by mouth daily.  albuterol (PROVENTIL HFA, VENTOLIN HFA, PROAIR HFA) 90 mcg/actuation inhaler Take 2 Puffs by inhalation every four (4) hours as needed for Wheezing. (Patient not taking: Reported on 1/19/2022) 1 Each 0    sucralfate (CARAFATE) 1 gram tablet TAKE 1 TABLET BY MOUTH FOUR TIMES A DAY (Patient not taking: Reported on 10/9/2021) 120 Tablet 0    diphenoxylate-atropine (LomotiL) 2.5-0.025 mg per tablet Take one or two tablets 4 times a day as needed for diarrhea not to exceed more then 8 tablets daily (Patient not taking: Reported on 11/9/2021) 120 Tab 0    gabapentin (NEURONTIN) 300 mg capsule Take 300 mg by mouth three (3) times daily. (Patient not taking: Reported on 10/9/2021)      methocarbamol (ROBAXIN) 500 mg tablet Take 500 mg by mouth daily. No facility-administered encounter medications on file as of 1/19/2022. Review of Symptoms:    Review of Systems   Constitutional: Negative. Negative for chills and fever. HENT: Negative. Negative for hearing loss. Respiratory: Negative. Negative for cough, hemoptysis and shortness of breath. Cardiovascular: Negative. Negative for chest pain, palpitations, orthopnea, claudication, leg swelling and PND. Gastrointestinal: Negative. Negative for nausea and vomiting. Musculoskeletal: Negative for myalgias. Skin: Negative. Negative for rash. Neurological: Negative. Negative for dizziness, loss of consciousness and headaches.        Physical Exam:      General: Well developed, in no acute distress, cooperative and alert  HEENT: No carotid bruits, no JVD, trach is midline. Neck Supple, PEERL, EOM intact. Heart:  reg rate and rhythm; normal S1/S2; no murmurs, no gallops or rubs. Respiratory: Clear bilaterally x 4, no wheezing or rales  Abdomen:   Soft, non-tender, no distention, no masses. + BS. Extremities:  Normal cap refill, no cyanosis, atraumatic. No edema. Neuro: A&Ox3, speech clear, gait stable. Skin: Skin color is normal. No rashes or lesions. Non diaphoretic  Vascular: 2+ pulses symmetric in all extremities    Visit Vitals  /86 (BP 1 Location: Right upper arm, BP Patient Position: Sitting, BP Cuff Size: Large adult)   Pulse 75   Resp 18   Ht 5' (1.524 m)   Wt 222 lb 3.2 oz (100.8 kg)   SpO2 98%   BMI 43.40 kg/m²       ECG: sinus rythm    Cardiology Labs:    Lab Results   Component Value Date/Time    Cholesterol, total 182 03/02/2021 10:42 AM    HDL Cholesterol 59 03/02/2021 10:42 AM    VLDL, calculated 13 08/08/2018 10:50 AM    VLDL 17 03/02/2021 10:42 AM    CHOL/HDL Ratio 3 03/02/2021 10:42 AM    LDL, calculated 106 03/02/2021 10:42 AM    LDL, calculated 88 08/08/2018 10:50 AM       Lab Results   Component Value Date/Time    Hemoglobin A1c 5.3 03/02/2021 10:43 AM       Lab Results   Component Value Date/Time    Sodium 139 11/09/2021 10:23 AM    Potassium 4.6 11/09/2021 10:23 AM    Chloride 107 11/09/2021 10:23 AM    CO2 28 11/09/2021 10:23 AM    Glucose 85 11/09/2021 10:23 AM    BUN 18 11/09/2021 10:23 AM    Creatinine 0.96 11/09/2021 10:23 AM    BUN/Creatinine ratio 19 11/09/2021 10:23 AM    GFR est AA >60 11/09/2021 10:23 AM    GFR est non-AA >60 11/09/2021 10:23 AM    Calcium 9.4 11/09/2021 10:23 AM    Anion gap 4 (L) 11/09/2021 10:23 AM    Bilirubin, total 0.3 04/27/2021 01:24 PM    ALT (SGPT) 36 04/27/2021 01:24 PM    Alk.  phosphatase 100 04/27/2021 01:24 PM    Protein, total 7.3 04/27/2021 01:24 PM    Albumin 4.3 04/27/2021 01:24 PM    Globulin 3.0 04/27/2021 01:24 PM A-G Ratio 1.4 04/27/2021 01:24 PM          Assessment:     Assessment:       ICD-10-CM ICD-9-CM    1. Palpitations  R00.2 785.1 AMB POC EKG ROUTINE W/ 12 LEADS, INTER & REP        Plan:     1. Palpitations  PVC's noted on holter. Symptoms have resolved. Discussed exercise, diet. Patient reassured. No treatment needed at this time. F/u if symptoms worse.   - AMB POC EKG ROUTINE W/ 12 LEADS, INTER & REP        Dalila Pickett MD

## 2022-03-18 PROBLEM — F32.1 MODERATE MAJOR DEPRESSION (HCC): Status: ACTIVE | Noted: 2018-08-08

## 2022-04-05 ENCOUNTER — OFFICE VISIT (OUTPATIENT)
Dept: URGENT CARE | Age: 52
End: 2022-04-05
Payer: COMMERCIAL

## 2022-04-05 VITALS
DIASTOLIC BLOOD PRESSURE: 83 MMHG | RESPIRATION RATE: 16 BRPM | HEART RATE: 87 BPM | OXYGEN SATURATION: 99 % | SYSTOLIC BLOOD PRESSURE: 144 MMHG | TEMPERATURE: 98.6 F

## 2022-04-05 DIAGNOSIS — J01.00 ACUTE NON-RECURRENT MAXILLARY SINUSITIS: Primary | ICD-10-CM

## 2022-04-05 DIAGNOSIS — J40 BRONCHITIS: ICD-10-CM

## 2022-04-05 PROCEDURE — 99213 OFFICE O/P EST LOW 20 MIN: CPT | Performed by: FAMILY MEDICINE

## 2022-04-05 RX ORDER — AZITHROMYCIN 250 MG/1
TABLET, FILM COATED ORAL
Qty: 6 TABLET | Refills: 0 | Status: SHIPPED | OUTPATIENT
Start: 2022-04-05 | End: 2022-05-26

## 2022-04-05 RX ORDER — PREDNISONE 5 MG/1
TABLET ORAL
Qty: 21 TABLET | Refills: 0 | Status: SHIPPED | OUTPATIENT
Start: 2022-04-05 | End: 2022-05-26

## 2022-04-05 RX ORDER — ALBUTEROL SULFATE 90 UG/1
2 AEROSOL, METERED RESPIRATORY (INHALATION)
Qty: 1 EACH | Refills: 0 | Status: SHIPPED | OUTPATIENT
Start: 2022-04-05 | End: 2022-05-26 | Stop reason: SDUPTHER

## 2022-04-05 RX ORDER — BENZONATATE 200 MG/1
200 CAPSULE ORAL
Qty: 30 CAPSULE | Refills: 0 | Status: SHIPPED | OUTPATIENT
Start: 2022-04-05 | End: 2022-05-26

## 2022-04-05 NOTE — PROGRESS NOTES
Emmie Serrano is a 46 y.o. female who presents with cough, sinus congestion, chest tightness x 2 weeks. Denies fever, SOB, N/V/D. Has used husbands albuterol with temporary relief. The history is provided by the patient. Past Medical History:   Diagnosis Date    Asthma     SYMPTOM-FREE SINCE     Chronic pain     lower back    Depression     TYPE A DEPRESSION    GERD (gastroesophageal reflux disease)     Hypertension     Other ill-defined conditions(799.89)     VITILIGO HANDS,FACE, JOINTS    Psychiatric disorder     DEPRESSION        Past Surgical History:   Procedure Laterality Date    HX CHOLECYSTECTOMY      HX GYN      NOVASURE ABLATION    HX GYN      D & C    HX HYSTERECTOMY          HX TONSILLECTOMY  AGE 7         Family History   Problem Relation Age of Onset    Diabetes Mother     Thyroid Disease Mother     Other Mother         MVP    Hypertension Mother     Heart Disease Mother     Cancer Father         MULTIPLE SKIN, SARCOMA    Hypertension Father     Heart Attack Maternal Grandfather     Cancer Paternal Grandfather          OF LUNG CA    Ulcerative Colitis Son         Social History     Socioeconomic History    Marital status:      Spouse name: Not on file    Number of children: Not on file    Years of education: Not on file    Highest education level: Not on file   Occupational History    Not on file   Tobacco Use    Smoking status: Never Smoker    Smokeless tobacco: Never Used   Vaping Use    Vaping Use: Never used   Substance and Sexual Activity    Alcohol use:  Yes     Alcohol/week: 3.0 standard drinks     Types: 3 Shots of liquor per week     Comment: on     Drug use: No    Sexual activity: Yes     Partners: Male     Birth control/protection: Surgical   Other Topics Concern    Not on file   Social History Narrative    Not on file     Social Determinants of Health     Financial Resource Strain:     Difficulty of Paying Living Expenses: Not on file   Food Insecurity:     Worried About [email or web address] in the Last Year: Not on file    [place] of Food in the Last Year: Not on file   Transportation Needs:     Lack of Transportation (Medical): Not on file    Lack of Transportation (Non-Medical): Not on file   Physical Activity:     Days of Exercise per Week: Not on file    Minutes of Exercise per Session: Not on file   Stress:     Feeling of Stress : Not on file   Social Connections:     Frequency of Communication with Friends and Family: Not on file    Frequency of Social Gatherings with Friends and Family: Not on file    Attends Yazidism Services: Not on file    Active Member of [address] [place] [address] or Organizations: Not on file    Attends Club or Organization Meetings: Not on file    Marital Status: Not on file   Intimate Partner Violence:     Fear of Current or Ex-Partner: Not on file    Emotionally Abused: Not on file    Physically Abused: Not on file    Sexually Abused: Not on file   Housing Stability:     Unable to Pay for Housing in the Last Year: Not on file    Number of [place] in the Last Year: Not on file    Unstable Housing in the Last Year: Not on file                ALLERGIES: Sulfa (sulfonamide antibiotics) and Sulfa dyne    Review of Systems   Constitutional: Negative for activity change, appetite change and fever. HENT: Positive for congestion, sinus pressure and sinus pain. Respiratory: Positive for cough and chest tightness. Negative for shortness of breath. Gastrointestinal: Negative for diarrhea, nausea and vomiting. Vitals:    04/05/22 0855   BP: (!) 144/83   Pulse: 87   Resp: 16   Temp: 98.6 °F (37 °C)   SpO2: 99%       Physical Exam  Vitals and nursing note reviewed. Constitutional:       General: She is not in acute distress. Appearance: She is well-developed. She is not diaphoretic.    HENT:      Right Ear: Tympanic membrane, ear canal and external ear normal.      Left Ear: Tympanic membrane, ear canal and external ear normal.      Nose: Congestion present. Right Sinus: Maxillary sinus tenderness present. No frontal sinus tenderness. Left Sinus: Maxillary sinus tenderness present. No frontal sinus tenderness. Mouth/Throat:      Pharynx: Posterior oropharyngeal erythema present. No oropharyngeal exudate. Tonsils: No tonsillar abscesses. Cardiovascular:      Rate and Rhythm: Normal rate and regular rhythm. Heart sounds: Normal heart sounds. Pulmonary:      Effort: Pulmonary effort is normal. No respiratory distress. Breath sounds: No stridor. Examination of the right-middle field reveals wheezing. Examination of the left-lower field reveals wheezing. Wheezing present. No rhonchi or rales. Lymphadenopathy:      Cervical: No cervical adenopathy. Neurological:      Mental Status: She is alert. Psychiatric:         Behavior: Behavior normal.         Thought Content: Thought content normal.         Judgment: Judgment normal.         [facility]    ICD-10-CM ICD-9-CM   1. Acute non-recurrent maxillary sinusitis  J01.00 461.0   2. Bronchitis  J40 490       Orders Placed This Encounter    predniSONE (STERAPRED) 5 mg dose pack     Sig: See administration instruction per 5mg dose pack     Dispense:  21 Tablet     Refill:  0    azithromycin (ZITHROMAX) 250 mg tablet     Sig: Take two tablets today then one tablet daily     Dispense:  6 Tablet     Refill:  0    benzonatate (TESSALON) 200 mg capsule     Sig: Take 1 Capsule by mouth three (3) times daily as needed for Cough. Dispense:  30 Capsule     Refill:  0    albuterol (PROVENTIL HFA, VENTOLIN HFA, PROAIR HFA) 90 mcg/actuation inhaler     Sig: Take 2 Puffs by inhalation every six (6) hours as needed for Wheezing. Dispense:  1 Each     Refill:  0        The patient is to follow up with PCP [facility]. If signs and symptoms become worse the pt is to go to the ER.          Procedures

## 2022-04-05 NOTE — PATIENT INSTRUCTIONS
Bronchitis: Care Instructions  Your Care Instructions     Bronchitis is inflammation of the bronchial tubes, which carry air to the lungs. The tubes swell and produce mucus, or phlegm. The mucus and inflamed bronchial tubes make you cough. You may have trouble breathing. Most cases of bronchitis are caused by viruses like those that cause colds. Antibiotics usually do not help and they may be harmful. Bronchitis usually develops rapidly and lasts about 2 to 3 weeks in otherwise healthy people. Follow-up care is a key part of your treatment and safety. Be sure to make and go to all appointments, and call your doctor if you are having problems. It's also a good idea to know your test results and keep a list of the medicines you take. How can you care for yourself at home? · Take all medicines exactly as prescribed. Call your doctor if you think you are having a problem with your medicine. · Get some extra rest.  · Take an over-the-counter pain medicine, such as acetaminophen (Tylenol), ibuprofen (Advil, Motrin), or naproxen (Aleve) to reduce fever and relieve body aches. Read and follow all instructions on the label. · Do not take two or more pain medicines at the same time unless the doctor told you to. Many pain medicines have acetaminophen, which is Tylenol. Too much acetaminophen (Tylenol) can be harmful. · Take an over-the-counter cough medicine to help quiet a dry, hacking cough so that you can sleep. Avoid cough medicines that have more than one active ingredient. Read and follow all instructions on the label. · Do not smoke. Smoking can make bronchitis worse. If you need help quitting, talk to your doctor about stop-smoking programs and medicines. These can increase your chances of quitting for good. When should you call for help? Call 911 anytime you think you may need emergency care. For example, call if:    · You have severe trouble breathing.    Call your doctor now or seek immediate medical care if:    · You have new or worse trouble breathing.     · You cough up dark brown or bloody mucus (sputum).     · You have a new or higher fever.     · You have a new rash. Watch closely for changes in your health, and be sure to contact your doctor if:    · You cough more deeply or more often, especially if you notice more mucus or a change in the color of your mucus.     · You are not getting better as expected. Where can you learn more? Go to http://www.gray.com/  Enter H333 in the search box to learn more about \"Bronchitis: Care Instructions. \"  Current as of: July 6, 2021               Content Version: 13.2  © 2006-2022 [employer]. Care instructions adapted under license by [employer] (which disclaims liability or warranty for this information). If you have questions about a medical condition or this instruction, always ask your healthcare professional. [place] [address] any warranty or liability for your use of this information. Sinusitis: Care Instructions  Your Care Instructions     Sinusitis is an infection of the lining of the sinus cavities in your head. Sinusitis often follows a cold. It causes pain and pressure in your head and face. In most cases, sinusitis gets better on its own in 1 to 2 weeks. But some mild symptoms may last for several weeks. Sometimes antibiotics are needed. Follow-up care is a key part of your treatment and safety. Be sure to make and go to all appointments, and call your doctor if you are having problems. It's also a good idea to know your test results and keep a list of the medicines you take. How can you care for yourself at home? · Take an over-the-counter pain medicine, such as acetaminophen (Tylenol), ibuprofen (Advil, Motrin), or naproxen (Aleve). Read and follow all instructions on the label. · If the doctor prescribed antibiotics, take them as directed.  Do not stop taking them just because you feel better. You need to take the full course of antibiotics. · Be careful when taking over-the-counter cold or flu medicines and Tylenol at the same time. Many of these medicines have acetaminophen, which is Tylenol. Read the labels to make sure that you are not taking more than the recommended dose. Too much acetaminophen (Tylenol) can be harmful. · Breathe warm, moist air from a steamy shower, a hot bath, or a sink filled with hot water. Avoid cold, dry air. Using a humidifier in your home may help. Follow the directions for cleaning the machine. · Use saline (saltwater) nasal washes. This can help keep your nasal passages open and wash out mucus and bacteria. You can buy saline nose drops at a grocery store or drugstore. Or you can make your own at home by adding 1 teaspoon of salt and 1 teaspoon of baking soda to 2 cups of distilled water. If you make your own, fill a bulb syringe with the solution, insert the tip into your nostril, and squeeze gently. [place] [address] your nose. · Put a hot, wet towel or a warm gel pack on your face 3 or 4 times a day for 5 to 10 minutes each time. · Try a decongestant nasal spray like oxymetazoline (Afrin). Do not use it for more than 3 days in a row. Using it for more than 3 days can make your congestion worse. When should you call for help? Call your doctor now or seek immediate medical care if:    · You have new or worse swelling or redness in your face or around your eyes.     · You have a new or higher fever. Watch closely for changes in your health, and be sure to contact your doctor if:    · You have new or worse facial pain.     · The mucus from your nose becomes thicker (like pus) or has new blood in it.     · You are not getting better as expected. Where can you learn more? Go to http://www.gray.com/  Enter P081 in the search box to learn more about \"Sinusitis: Care Instructions. \"  Current as of: September 8, 2021               Content Version: 13.2  © 1579-8750 [employer]. Care instructions adapted under license by [employer] (which disclaims liability or warranty for this information). If you have questions about a medical condition or this instruction, always ask your healthcare professional. [address] any warranty or liability for your use of this information.

## 2022-05-24 NOTE — PROGRESS NOTES
Subjective:     Chief Complaint   Patient presents with   [address] is a 46 y.o. F.  She has a history of hypertension and depression. She was seen most recently in this clinic in November for complaint of palpitations. She had been complaining of her heart racing intermittently with no history of chest pain. She had also undergone a full GI work-up for a history of diarrhea with upper and lower endoscopy performed by Dr. Lashonda Cook. Her blood pressure was being managed at the time with lisinopril 2.5 mg daily and spironolactone 50 mg daily. Physical examination was notable to time mainly for obesity with a BMI 43.40 kg/m². She was referred for a 24-hour Holter monitor. Lab studies were ordered. Holter monitor performed later that month had demonstrated only sinus rhythm with an average heart rate of 79 bpm, with occasional PVCs and rare PACs. She was seen in January in the cardiology clinic for her palpitations. She was advised to improve her diet and exercise, and no treatment was felt to be necessary at the time. Today, the patient comes in for establishment as well as routine follow-up and preventive care. She reports feeling fine overall today and has no significant acute somatic complaints. Since she had undergone the colonoscopy and upper endoscopy by gastroenterology last year, she has had no recurrent symptoms of her diarrhea. She says that this is now being attributed to the use of the gabapentin, which was since discontinued, and she has had no recurrence of her symptoms. In addition, she has had no further episodes of palpitations, and continues to be without any chest pain, shortness of breath, or any further cardiopulmonary symptoms. She reports that she had completed her mammography in July of last year, and we will continue with yearly screening. She has a history of asthma, for which he uses rescue inhaler perhaps once per year.   She denies any orthopnea, paroxysmal nocturnal dyspnea, coughing, wheezing, or changes in exercise tolerance. She continues on spironolactone and lisinopril for her history of hypertension. With this, her blood pressure readings at home are similar to where they are in clinic today. No lower extremity edema or other symptoms. Bupropion continues to be effective for her history of depression. Denies any suicidal or homicidal ideation or any agitation or anxiety. She is interested today in pharmacotherapy for weight loss. She denies any personal history of medullary thyroid carcinoma, pheochromocytoma, or pancreatitis. No family history of any multiple endocrine neoplasia. Routine Healthcare Maintenance issues are reviewed and discussed with the patient as noted below. Orders to update gaps in healthcare maintenance were placed as noted below in the Assessment and Plan, where applicable. Physical examination is notable for obesity. Skin changes consistent with her history of vitiligo are noted. 10-year Cardiovascular Risk Gwendolynpollo Aldridge, 2013):   The 10-year ASCVD risk score (Fabiano Patel et al., 2013) is: 1.8%    Values used to calculate the score:      Age: 46 years      Sex: Female      Is Non- : No      Diabetic: No      Tobacco smoker: No      Systolic Blood Pressure: 454 mmHg      Is BP treated: Yes      HDL Cholesterol: 59 mg/dL      Total Cholesterol: 182 mg/dL       Past Medical History:  Past Medical History:   Diagnosis Date    Asthma     SYMPTOM-FREE SINCE 2007    Chronic pain     lower back    Depression     TYPE A DEPRESSION    GERD (gastroesophageal reflux disease)     History of acute sinusitis 04/2022    History of bronchitis 04/2022    History of palpitations     Holter 11/21 - NSR, PVCs (occ), PACs (rare); Cards f/u 1/22 no tx req'd    Hypertension     Vitiligo     VITILIGO HANDS,FACE, JOINTS       Past Surgical Histor:  Past Surgical History:   Procedure Laterality Date    HX CHOLECYSTECTOMY      HX GYN  7/09    NOVASURE ABLATION    HX GYN      D & C    HX HYSTERECTOMY      2012    HX TONSILLECTOMY  AGE 7       Allergies: Allergies   Allergen Reactions    Sulfa (Sulfonamide Antibiotics) Hives    Sulfa Dyne Hives and Itching       Medications:  Current Outpatient Medications   Medication Sig Dispense Refill    liraglutide, weight loss, (SAXENDA) 3 mg/0.5 mL (18 mg/3 mL) pen 0.6 mg daily for 7 days, THEN 1.2 mg daily for 7 days, THEN 1.8 mg daily for 7 days, THEN 2.4 mg daily for 7 days, THEN 3 mg daily for 360 days. 4 Each 11    albuterol (PROVENTIL HFA, VENTOLIN HFA, PROAIR HFA) 90 mcg/actuation inhaler Take 2 Puffs by inhalation every six (6) hours as needed for Wheezing. 1 Each 0    metaxalone (SKELAXIN) 800 mg tablet Take 1 Tablet by mouth two (2) times a day for 30 days. 60 Tablet 0    spironolactone (ALDACTONE) 50 mg tablet Take 1 Tablet by mouth daily. 90 Tablet 3    buPROPion XL (WELLBUTRIN XL) 300 mg XL tablet Take 1 Tablet by mouth daily. 90 Tablet 3    cholecalciferol (VITAMIN D3) (5000 Units/125 mcg) tab tablet Take 1 Tablet by mouth daily for 360 days. 30 Tablet 11    omeprazole (PRILOSEC) 40 mg capsule Take 1 Capsule by mouth daily for 360 days. Every other day 30 Capsule 11    lisinopriL (PRINIVIL, ZESTRIL) 2.5 mg tablet Take 1 Tablet by mouth daily. 90 Tablet 3    pregabalin (LYRICA) 100 mg capsule Take 1 Capsule by mouth two (2) times a day for 360 days. Max Daily Amount: 200 mg. 60 Capsule 11    famotidine (PEPCID) 40 mg tablet Take 1 Tablet by mouth daily. 90 Tablet 3    augmented betamethasone dipropionate (DIPROLENE-AF) 0.05 % topical cream Apply  to affected area two (2) times a day. 15 g 0    levocetirizine (XYZAL) 5 mg tablet Take  by mouth.  AE-HY-BJ-Fe-Min-Lycopen-Lutein (CENTRUM) 0.4-162-18 mg Tab Take  by mouth daily.          Social History:  Social History     Socioeconomic History    Marital status:    Tobacco Use  Smoking status: Never Smoker    Smokeless tobacco: Never Used   Vaping Use    Vaping Use: Never used   Substance and Sexual Activity    Alcohol use: Yes     Alcohol/week: 3.0 standard drinks     Types: 3 Shots of liquor per week     Comment: on     Drug use: No    Sexual activity: Yes     Partners: Male     Birth control/protection: Surgical       Family History:  Family History   Problem Relation Age of Onset    Diabetes Mother     Thyroid Disease Mother     Other Mother         MVP    Hypertension Mother     Heart Disease Mother     Cancer Father         MULTIPLE SKIN, SARCOMA    Hypertension Father     Heart Attack Maternal Grandfather     Cancer Paternal Grandfather          OF LUNG CA    Ulcerative Colitis Son        Immunizations:  Immunization History   Administered Date(s) Administered    COVID-19, Moderna Booster, PF, 0.25mL Dose 2021, 2021, 10/28/2021    Influenza Vaccine 10/28/2021    Tdap 2016        Healthcare Maintenance:  Health Maintenance   Topic Date Due    COVID-19 Vaccine (1) 1975    Pneumococcal 0-64 years (1 - PCV) Never done    Shingrix Vaccine Age 50> (1 of 2) Never done    Depression Monitoring  2022    Breast Cancer Screen Mammogram  2022    Colorectal Cancer Screening Combo  2023    Lipid Screen  2026    DTaP/Tdap/Td series (2 - Td or Tdap) 2026    Hepatitis C Screening  Completed    Flu Vaccine  Completed        Review of Systems:  ROS:  Review of Systems   Constitutional: Negative. HENT: Negative. Eyes: Negative. Respiratory: Negative. Cardiovascular: Negative. Gastrointestinal: Negative. Genitourinary: Negative. Musculoskeletal: Negative. Skin: Negative. Neurological: Negative. Endo/Heme/Allergies: Negative. Psychiatric/Behavioral: Negative.        ROS otherwise negative      Objective:     Vital Signs:  Visit Vitals  /84 (BP 1 Location: Left upper arm, BP Patient Position: Sitting, BP Cuff Size: Large adult)   Pulse 69   Temp 97.6 °F (36.4 °C) (Oral)   Resp 16   Ht 5' (1.524 m)   Wt 227 lb 3.2 oz (103.1 kg)   LMP 11/01/2012   SpO2 98%   BMI 44.37 kg/m²       BMI:  Body mass index is 44.37 kg/m². Physical Examination:  Physical Exam  Constitutional:       Appearance: Normal appearance. She is obese. HENT:      Head: Normocephalic and atraumatic. Right Ear: External ear normal.      Left Ear: External ear normal.      Nose: Nose normal.      Mouth/Throat:      Mouth: Mucous membranes are moist.      Pharynx: Oropharynx is clear. No oropharyngeal exudate or posterior oropharyngeal erythema. Cardiovascular:      Rate and Rhythm: Normal rate and regular rhythm. Pulses: Normal pulses. Heart sounds: Normal heart sounds. No murmur heard. No friction rub. No gallop. Pulmonary:      Effort: Pulmonary effort is normal. No respiratory distress. Breath sounds: Normal breath sounds. No wheezing, rhonchi or rales. Abdominal:      General: Abdomen is flat. Bowel sounds are normal. There is no distension. Palpations: Abdomen is soft. Tenderness: There is no abdominal tenderness. There is no guarding. Musculoskeletal:         General: No swelling, tenderness or deformity. Normal range of motion. Cervical back: Normal range of motion and neck supple. No rigidity or tenderness. Skin:     General: Skin is warm and dry. Findings: No erythema, lesion or rash. Neurological:      General: No focal deficit present. Mental Status: She is alert and oriented to person, place, and time. Mental status is at baseline. Sensory: No sensory deficit. Motor: No weakness.       Gait: Gait normal.      Deep Tendon Reflexes: Reflexes normal.   Psychiatric:         Mood and Affect: Mood normal.         Behavior: Behavior normal.         Judgment: Judgment normal.          Physical exam otherwise negative    Diagnostic Testing:    Laboratory Studies:  Office Visit on 11/09/2021   Component Date Value Ref Range Status    T4, Free 11/09/2021 0.9  0.8 - 1.5 NG/DL Final    TSH 11/09/2021 1.39  0.36 - 3.74 uIU/mL Final    Comment:   Due to TSH heterogeneity, both structurally and degree of glycosylation,  monoclonal antibodies used in the TSH assay may not accurately quantitate TSH. Therefore, this result should be correlated with clinical findings as well as  with other assessments of thyroid function, e.g., free T4, free T3.  Sodium 11/09/2021 139  136 - 145 mmol/L Final    Potassium 11/09/2021 4.6  3.5 - 5.1 mmol/L Final    Chloride 11/09/2021 107  97 - 108 mmol/L Final    CO2 11/09/2021 28  21 - 32 mmol/L Final    Anion gap 11/09/2021 4* 5 - 15 mmol/L Final    Glucose 11/09/2021 85  65 - 100 mg/dL Final    BUN 11/09/2021 18  6 - 20 MG/DL Final    Creatinine 11/09/2021 0.96  0.55 - 1.02 MG/DL Final    BUN/Creatinine ratio 11/09/2021 19  12 - 20   Final    GFR est AA 11/09/2021 >60  >60 ml/min/1.73m2 Final    GFR est non-AA 11/09/2021 >60  >60 ml/min/1.73m2 Final    Comment: Estimated GFR is calculated using the IDMS-traceable Modification of Diet in  Renal Disease (MDRD) Study equation, reported for both  Americans  (GFRAA) and non- Americans (GFRNA), and normalized to 1.73m2 body  surface area. The physician must decide which value applies to the patient.       Calcium 11/09/2021 9.4  8.5 - 10.1 MG/DL Final    WBC 11/09/2021 7.8  3.6 - 11.0 K/uL Final    RBC 11/09/2021 4.59  3.80 - 5.20 M/uL Final    HGB 11/09/2021 14.2  11.5 - 16.0 g/dL Final    HCT 11/09/2021 43.4  35.0 - 47.0 % Final    MCV 11/09/2021 94.6  80.0 - 99.0 FL Final    MCH 11/09/2021 30.9  26.0 - 34.0 PG Final    MCHC 11/09/2021 32.7  30.0 - 36.5 g/dL Final    RDW 11/09/2021 12.9  11.5 - 14.5 % Final    PLATELET 60/98/0449 552  150 - 400 K/uL Final    MPV 11/09/2021 10.2  8.9 - 12.9 FL Final    NRBC 11/09/2021 0.0  0  WBC Final    ABSOLUTE NRBC 11/09/2021 0.00  0.00 - 0.01 K/uL Final    NEUTROPHILS 11/09/2021 63  32 - 75 % Final    LYMPHOCYTES 11/09/2021 23  12 - 49 % Final    MONOCYTES 11/09/2021 8  5 - 13 % Final    EOSINOPHILS 11/09/2021 5  0 - 7 % Final    BASOPHILS 11/09/2021 1  0 - 1 % Final    IMMATURE GRANULOCYTES 11/09/2021 0  0.0 - 0.5 % Final    ABS. NEUTROPHILS 11/09/2021 4.9  1.8 - 8.0 K/UL Final    ABS. LYMPHOCYTES 11/09/2021 1.8  0.8 - 3.5 K/UL Final    ABS. MONOCYTES 11/09/2021 0.6  0.0 - 1.0 K/UL Final    ABS. EOSINOPHILS 11/09/2021 0.4  0.0 - 0.4 K/UL Final    ABS. BASOPHILS 11/09/2021 0.1  0.0 - 0.1 K/UL Final    ABS. IMM. GRANS. 11/09/2021 0.0  0.00 - 0.04 K/UL Final    DF 11/09/2021 AUTOMATED    Final   Office Visit on 10/09/2021   Component Date Value Ref Range Status    SARS-CoV-2, KATHARINE 10/09/2021 Not Detected  Not Detected Final    Comment: This nucleic acid amplification test was developed and its performance  characteristics determined by [facility]. Nucleic acid  amplification tests include RT-PCR and TMA. This test has not been  FDA cleared or approved. This test has been authorized by FDA under  an Emergency Use Authorization (EUA). This test is only authorized  for the duration of time the declaration that circumstances exist  justifying the authorization of the emergency use of in vitro  diagnostic tests for detection of SARS-CoV-2 virus and/or diagnosis  of COVID-19 infection under section 564(b)(1) of the Act, 21 U. S.C.  875IAP-1(S) (1), unless the authorization is terminated or revoked  sooner. When diagnostic testing is negative, the possibility of a false  negative result should be considered in the context of a patient's  recent exposures and the presence of clinical signs and symptoms  consistent with COVID-19.  An individual without symptoms of COVID-19  and who is not shedding SARS-CoV-2 virus wo                           uld expect to have a  negative (not detected) result in this assay.  SARS-CoV-2, KATHARINE 2 DAY TAT 10/09/2021 Performed   Final         Radiographic Studies:  XR Results (most recent):  Results from Hospital Encounter encounter on 04/27/21    XR CHEST PA LAT    Narrative  INDICATION:  chest pain midsternal.    EXAM: 2 VIEW CHEST RADIOGRAPH. COMPARISON: None. FINDINGS: Frontal and lateral views of the chest show clear lungs. . The heart,  mediastinum and pulmonary vasculature are normal.  The bony thorax is  unremarkable for age. ..    Impression  No acute cardiopulmonary disease radiographically. .  . [facility] Results (most recent):  Results from [place] encounter on 09/16/15    [facility] MAMMO RT DX DIGTL    Narrative  **Final Report**      ICD Codes / Adm. Diagnosis: V76.12  575.6 / add views  Examination:  MM MAMMO DIG DX UNI RT  - [id number] - Sep 16 2015  2:38PM  Accession No:  [id number]  Reason:  add views      REPORT:  History: Abnormal mammogram.    Unilateral right digital diagnostic mammography, interpreted in conjunction  with CAD over-read,  was performed. The breast parenchymal pattern is  scattered fibroglandular densities. Asymmetry in the upper right breast is  not significantly changed when compared to multiple prior examinations. No  new masses or suspicious microcalcifications are identified. Impression  :  1. BI-RADS category 2, benign. 2. The patient should return in one year for routine bilateral mammography. 3. She was informed. Signing/Reading Doctor: Meera Lang ([id number])  Marianne Jaramillo ([id number])  Sep 16 2015  3:02PM     CT Results (most recent):  Results from Hospital Encounter encounter on 04/27/21    CT ABD PELV W CONT    Narrative  EXAM: CT ABD PELV W CONT    INDICATION: Diarrhea x9 days    COMPARISON: None    CONTRAST: 100 mL of Isovue-370. TECHNIQUE:  Following the uneventful intravenous administration of contrast, thin axial  images were obtained through the abdomen and pelvis.  Coronal and sagittal  reconstructions were generated. Oral contrast was not administered. CT dose  reduction was achieved through use of a standardized protocol tailored for this  examination and automatic exposure control for dose modulation. FINDINGS:  LOWER THORAX: No significant abnormality in the incidentally imaged lower chest.  LIVER: No mass. BILIARY TREE: Prior cholecystectomy. CBD is not dilated. SPLEEN: within normal limits. PANCREAS: No mass or ductal dilatation. ADRENALS: Unremarkable. KIDNEYS: No mass, calculus, or hydronephrosis. STOMACH: Mild thickening of the distal stomach. SMALL BOWEL: No dilatation or wall thickening. COLON: No dilatation or wall thickening. APPENDIX: Normal on coronal image 42  PERITONEUM: No ascites or pneumoperitoneum. RETROPERITONEUM: No lymphadenopathy or aortic aneurysm. REPRODUCTIVE ORGANS: Prior hysterectomy  URINARY BLADDER: No mass or calculus. BONES: No destructive bone lesion. Erosive changes at the L5-S1 levels  bilaterally. 5.6 mm anterolisthesis of L5 relative to the sacrum. ABDOMINAL WALL: No mass or hernia. ADDITIONAL COMMENTS: N/A    Impression  Mild gastritis  No focal bowel process identified  Grade 1 anterolisthesis of L4 on a degenerative basis     DEXA Results (most recent):  No results found for this or any previous visit. MRI Results (most recent):  Results from [place] encounter on 12/18/20    MRI LUMB SPINE WO CONT    Narrative  EXAM: MRI LUMB SPINE WO CONT    INDICATION: Low back pain, hysterectomy. COMPARISON: None    TECHNIQUE: MR imaging of the lumbar spine was performed using the following  sequences: sagittal T1, T2, STIR;  axial T1, T2.    CONTRAST:  None. FINDINGS:    5 mm grade 1 anterolisthesis of L5-S1. Facet arthrosis, no spondylolysis. Vertebral body heights are maintained. Marrow signal is normal. Mild disc  desiccation at L5-S1. The conus medullaris terminates at L1-L2.  Signal and caliber of the distal  spinal cord are within normal limits. The paraspinal soft tissues are within normal limits. Lower thoracic spine: No herniation or stenosis. L1-L2: Diffuse disc bulge. No stenosis. L2-L3: No herniation or stenosis. L3-L4: Diffuse disc bulge, facet arthrosis, and thickened ligamentum flavum. Mild central spinal canal stenosis. L4-L5: Diffuse disc bulge, facet arthrosis, and thickened ligamentum flavum. No  stenosis. L5-S1: Uncovered disc. Facet arthrosis. No stenosis. Impression  IMPRESSION:    1. L3-L4 mild central spinal canal stenosis. 2. L5-S1 grade 1 anterolisthesis due to facet arthrosis. Assessment/Plan:       ICD-10-CM ICD-9-CM    1. Routine adult health maintenance  Z00.00 V70.0    2. Essential hypertension  I10 401.9 spironolactone (ALDACTONE) 50 mg tablet      lisinopriL (PRINIVIL, ZESTRIL) 2.5 mg tablet   3. Class 3 severe obesity due to excess calories without serious comorbidity with body mass index (BMI) of 40.0 to 44.9 in adult ([facility])  E66.01 278.01 REFERRAL TO DIETITIAN    Z68.41 V85.41 liraglutide, weight loss, (SAXENDA) 3 mg/0.5 mL (18 mg/3 mL) pen   4. Mild intermittent asthma without complication  F80.75 716.03 albuterol (PROVENTIL HFA, VENTOLIN HFA, PROAIR HFA) 90 mcg/actuation inhaler   5. Gastroesophageal reflux disease without esophagitis  K21.9 530.81 omeprazole (PRILOSEC) 40 mg capsule   6. Encounter for immunization  Z23 V03.89 ADMIN PNEUMOCOCCAL VACCINE      PNEUMOCOCCAL CONJUGATE PCV20, PF (PREVNAR 20)      ZOSTER VACC RECOMBINANT ADJUVANTED   7. Screen for colon cancer  Z12.11 V76.51 CANCELED: REFERRAL FOR COLONOSCOPY   8. Encounter for screening mammogram for malignant neoplasm of breast  Z12.31 V76.12 CANCELED: [facility] MAMMO BI SCREENING INCL CAD   9. Depression, unspecified depression type  F32. A 311    10. Chronic pain syndrome  G89.4 338. 4 pregabalin (LYRICA) 100 mg capsule          Healthcare Maintenance:  - Preventive measures are reviewed as per above  - Up to date on routine interventions except as noted above  - Orders placed to update gaps as noted  - Notes: Up-to-date on colonoscopy and mammography. She is due for mammography again in the summertime. Pneumococcal and Shingrix vaccines provided today. Obesity:   - Body mass index is 44.37 kg/m². - Current Obesity Medications (if any):   Key Obesity Meds             liraglutide, weight loss, (SAXENDA) 3 mg/0.5 mL (18 mg/3 mL) pen (Taking) 0.6 mg daily for 7 days, THEN 1.2 mg daily for 7 days, THEN 1.8 mg daily for 7 days, THEN 2.4 mg daily for 7 days, THEN 3 mg daily for 360 days. spironolactone (ALDACTONE) 50 mg tablet (Taking) Take 1 Tablet by mouth daily.         - Discussed therapeutic lifestyle changes: dietary modifications, caloric restriction, increased aerobic exercise, resistance training   - Nutrition Referral: YES   - Comorbidities:   - Obstructive sleep apnea: NO   - Obesity-related hypoventilation:NO    - Diabetes Mellitus:NO   - Hypertension:YES   - Other:NO   - Qualifies for medication or bariatric surgery referral? YES   - Notes: Given BMI, she is eligible for pharmacotherapy. Referral to nutrition placed. We discussed the risks and benefits as well as adverse effects of Saxenda. She is willing to start this today. This is ordered. Essential Hypertension/Blood Pressure Management:   - Home BP Readings: not doing   - Current Control: optimal   - Target BP: <140/90 mmHg   - Relevant BP Meds:  Key CAD CHF Meds             spironolactone (ALDACTONE) 50 mg tablet (Taking) Take 1 Tablet by mouth daily.     lisinopriL (PRINIVIL, ZESTRIL) 2.5 mg tablet (Taking) Take 1 Tablet by mouth daily.         - Plan: continue current meds, dietary sodium restriction, regular aerobic exercise, weight loss   - Notes: CCM    Anxiety/Depression:   - Current PHQ: No data recorded    - Current RX:   Key Psychotherapeutic Meds             buPROPion XL (WELLBUTRIN XL) 300 mg XL tablet (Taking) Take 1 Tablet by mouth daily.         - Psychology/Psychiatry Co-managing? No   - Well controlled, no red flags, CCM       Asthma/COPD:   - Current Symptoms: taking medications as instructed, no medication side effects noted, no significant ongoing wheezing or shortness of breath, using bronchodilator MDI less than twice a week   - CAT/ACT: ---   - MMRC: 25/25   - Current Assessment: well controlled   - Current Regimen:   Key COPD Medications             albuterol (PROVENTIL HFA, VENTOLIN HFA, PROAIR HFA) 90 mcg/actuation inhaler (Taking) Take 2 Puffs by inhalation every six (6) hours as needed for Wheezing.         - Plan: current treatment plan is effective, no change in therapy, reviewed use of rescue vs controlling agents, oral and inhaled meds and potential side effects      Follow-up and Dispositions    · Return in about 3 months (around 8/26/2022). Fela Nava MD    Please note that this dictation was completed with [employer] the [employer] voice recognition software. Quite often unanticipated grammatical, syntax, homophones, and other interpretive errors are inadvertently transcribed by the computer software. Please disregard these errors. Please excuse any errors that have escaped final proofreading.      This was a complex patient and total appointment time was at least 40 minutes, to include:  - review of medical record  - history gathering, physical examination, and review of systems  - medication reconciliation  - medical decision-making  - counseling on the plan of care

## 2022-05-26 ENCOUNTER — OFFICE VISIT (OUTPATIENT)
Dept: INTERNAL MEDICINE CLINIC | Age: 52
End: 2022-05-26
Payer: COMMERCIAL

## 2022-05-26 VITALS
TEMPERATURE: 97.6 F | HEIGHT: 60 IN | RESPIRATION RATE: 16 BRPM | SYSTOLIC BLOOD PRESSURE: 135 MMHG | DIASTOLIC BLOOD PRESSURE: 84 MMHG | HEART RATE: 69 BPM | BODY MASS INDEX: 44.61 KG/M2 | OXYGEN SATURATION: 98 % | WEIGHT: 227.2 LBS

## 2022-05-26 DIAGNOSIS — Z23 ENCOUNTER FOR IMMUNIZATION: ICD-10-CM

## 2022-05-26 DIAGNOSIS — J45.20 MILD INTERMITTENT ASTHMA WITHOUT COMPLICATION: ICD-10-CM

## 2022-05-26 DIAGNOSIS — G89.4 CHRONIC PAIN SYNDROME: ICD-10-CM

## 2022-05-26 DIAGNOSIS — I10 ESSENTIAL HYPERTENSION: ICD-10-CM

## 2022-05-26 DIAGNOSIS — F32.A DEPRESSION, UNSPECIFIED DEPRESSION TYPE: ICD-10-CM

## 2022-05-26 DIAGNOSIS — Z00.00 ROUTINE ADULT HEALTH MAINTENANCE: Primary | ICD-10-CM

## 2022-05-26 DIAGNOSIS — E66.01 CLASS 3 SEVERE OBESITY DUE TO EXCESS CALORIES WITHOUT SERIOUS COMORBIDITY WITH BODY MASS INDEX (BMI) OF 40.0 TO 44.9 IN ADULT (HCC): ICD-10-CM

## 2022-05-26 DIAGNOSIS — K21.9 GASTROESOPHAGEAL REFLUX DISEASE WITHOUT ESOPHAGITIS: ICD-10-CM

## 2022-05-26 PROCEDURE — 99215 OFFICE O/P EST HI 40 MIN: CPT | Performed by: INTERNAL MEDICINE

## 2022-05-26 PROCEDURE — 90471 IMMUNIZATION ADMIN: CPT | Performed by: INTERNAL MEDICINE

## 2022-05-26 PROCEDURE — 90472 IMMUNIZATION ADMIN EACH ADD: CPT | Performed by: INTERNAL MEDICINE

## 2022-05-26 RX ORDER — LISINOPRIL 2.5 MG/1
2.5 TABLET ORAL DAILY
Qty: 90 TABLET | Refills: 3 | Status: SHIPPED | OUTPATIENT
Start: 2022-05-26

## 2022-05-26 RX ORDER — ALBUTEROL SULFATE 90 UG/1
2 AEROSOL, METERED RESPIRATORY (INHALATION)
Qty: 1 EACH | Refills: 0 | Status: SHIPPED | OUTPATIENT
Start: 2022-05-26 | End: 2022-06-30

## 2022-05-26 RX ORDER — METAXALONE 800 MG/1
800 TABLET ORAL 2 TIMES DAILY
Qty: 60 TABLET | Refills: 0 | Status: SHIPPED | OUTPATIENT
Start: 2022-05-26 | End: 2022-10-29 | Stop reason: SDUPTHER

## 2022-05-26 RX ORDER — OMEPRAZOLE 40 MG/1
40 CAPSULE, DELAYED RELEASE ORAL DAILY
Qty: 30 CAPSULE | Refills: 11 | Status: SHIPPED | OUTPATIENT
Start: 2022-05-26 | End: 2022-05-31 | Stop reason: SDUPTHER

## 2022-05-26 RX ORDER — SPIRONOLACTONE 50 MG/1
50 TABLET, FILM COATED ORAL DAILY
Qty: 90 TABLET | Refills: 3 | Status: SHIPPED | OUTPATIENT
Start: 2022-05-26

## 2022-05-26 RX ORDER — BUPROPION HYDROCHLORIDE 300 MG/1
300 TABLET ORAL DAILY
Qty: 90 TABLET | Refills: 3 | Status: SHIPPED | OUTPATIENT
Start: 2022-05-26

## 2022-05-26 RX ORDER — PREGABALIN 100 MG/1
100 CAPSULE ORAL 2 TIMES DAILY
Qty: 60 CAPSULE | Refills: 11 | Status: SHIPPED | OUTPATIENT
Start: 2022-05-26 | End: 2023-05-21

## 2022-05-26 RX ORDER — CHOLECALCIFEROL TAB 125 MCG (5000 UNIT) 125 MCG
5000 TAB ORAL DAILY
Qty: 30 TABLET | Refills: 11 | Status: SHIPPED | OUTPATIENT
Start: 2022-05-26 | End: 2023-05-21

## 2022-05-26 NOTE — PATIENT INSTRUCTIONS
Learning About the [address] [facility] Diet  What is the Mediterranean diet? The Mediterranean diet is a style of eating rather than a diet plan. It features foods eaten in [place], [place], [place], and other countries along the [facility]. It emphasizes eating foods like fish, fruits, vegetables, beans, high-fiber breads and whole grains, nuts, and olive oil. This style of eating includes limited red meat, cheese, and sweets. Why choose the Mediterranean diet? A Mediterranean-style diet may improve heart health. It contains more fat than other heart-healthy diets. But the fats are mainly from nuts, unsaturated oils (such as fish oils and olive oil), and certain nut or seed oils (such as canola, soybean, or flaxseed oil). These fats may help protect the heart and blood vessels. How can you get started on the Mediterranean diet? Here are some things you can do to switch to a more Mediterranean way of eating. What to eat  · Eat a variety of fruits and vegetables each day, such as grapes, blueberries, tomatoes, broccoli, peppers, figs, olives, spinach, eggplant, beans, lentils, and chickpeas. · Eat a variety of whole-grain foods each day, such as oats, brown rice, and whole wheat bread, pasta, and couscous. · Eat fish at least 2 times a week. Try tuna, salmon, mackerel, lake trout, herring, or sardines. · Eat moderate amounts of low-fat dairy products, such as milk, cheese, or yogurt. · Eat moderate amounts of poultry and eggs. · Choose healthy (unsaturated) fats, such as nuts, olive oil, and certain nut or seed oils like canola, soybean, and flaxseed. · Limit unhealthy (saturated) fats, such as butter, palm oil, and coconut oil. And limit fats found in animal products, such as meat and dairy products made with whole milk. Try to eat red meat only a few times a month in very small amounts. · Limit sweets and desserts to only a few times a week.  This includes sugar-sweetened drinks like soda. The Mediterranean diet may also include red wine with your meal--1 glass each day for women and up to 2 glasses a day for men. Tips for eating at home  · Use herbs, spices, garlic, lemon zest, and citrus juice instead of salt to add flavor to foods. · Add avocado slices to your sandwich instead of carrasco. · Have fish for lunch or dinner instead of red meat. Brush the fish with olive oil, and broil or grill it. · Sprinkle your salad with seeds or nuts instead of cheese. · Cook with olive or canola oil instead of butter or oils that are high in saturated fat. · Switch from 2% milk or whole milk to 1% or fat-free milk. · Dip raw vegetables in a vinaigrette dressing or hummus instead of dips made from mayonnaise or sour cream.  · Have a piece of fruit for dessert instead of a piece of cake. Try baked apples, or have some dried fruit. Tips for eating out  · Try broiled, grilled, baked, or poached fish instead of having it fried or breaded. · Ask your  to have your meals prepared with olive oil instead of butter. · Order dishes made with marinara sauce or sauces made from olive oil. Avoid sauces made from cream or mayonnaise. · Choose whole-grain breads, whole wheat pasta and pizza crust, brown rice, beans, and lentils. · Cut back on butter or margarine on bread. Instead, you can dip your bread in a small amount of olive oil. · Ask for a side salad or grilled vegetables instead of french fries or chips. Where can you learn more? Go to http://www.thompson.com/  Enter O407 in the search box to learn more about \"Learning About the Mediterranean Diet. \"  Current as of: September 8, 2021               Content Version: 13.2  © 7577-6908 Healthwise, Incorporated. Care instructions adapted under license by [employer] (which disclaims liability or warranty for this information).  If you have questions about a medical condition or this instruction, always ask your healthcare professional. [place] [address] any warranty or liability for your use of this information. Liraglutide: Patient drug information   Copyright 7371-8865 [facility] 240 rights reserved. Contributor Disclosures  (For additional information see \"Liraglutide: Drug information\" and see \"Liraglutide: Pediatric drug information\")    You must carefully read the \"Consumer Information Use and Disclaimer\" below in order to understand and correctly use this information. Brand Names: [place]    Saxenda;    Victoza  Brand Names: [place];    Victoza  Warning     This drug has been shown to cause thyroid cancer in some animals. It is not known if this happens in humans. If thyroid cancer happens, it may be deadly if not found and treated early. Call your doctor right away if you have a neck mass, trouble breathing, trouble swallowing, or have hoarseness that will not go away.   Do not use this drug if you have a health problem called Multiple Endocrine Neoplasia syndrome type 2 (MEN 2), or if you or a family member have had thyroid cancer. What is this drug used for?  Victoza:   Marta Points It is used to lower blood sugar in patients with high blood sugar (diabetes). Marta Points It is used to lower the chance of heart attack, stroke, and death in some people.  Saxenda:   Marta Points It is used to help with weight loss in certain people. What do I need to tell my doctor BEFORE I take this drug?  For all uses of this drug:     If you are allergic to this drug; any part of this drug; or any other drugs, foods, or substances. Tell your doctor about the allergy and what signs you had.   If you have or have ever had depression or thoughts of suicide.   If you are using another drug that has the same drug in it.   If you are using another drug like this one. If you are not sure, ask your doctor or pharmacist.   Marta Points If you are pregnant or may be pregnant.  Some brands of this drug are not for use during pregnancy.  If using for high blood sugar:     If you have type 1 diabetes. Do not use this drug to treat type 1 diabetes.  This is not a list of all drugs or health problems that interact with this drug.  Tell your doctor and pharmacist about all of your drugs (prescription or OTC, natural products, vitamins) and health problems. You must check to make sure that it is safe for you to take this drug with all of your drugs and health problems. Do not start, stop, or change the dose of any drug without checking with your doctor. What are some things I need to know or do while I take this drug?  For all uses of this drug:     Tell all of your health care providers that you take this drug. This includes your doctors, nurses, pharmacists, and dentists.   Have blood work checked as you have been told by the doctor. Talk with the doctor. Malena Kelley Talk with your doctor before you drink alcohol.   Kidney problems have happened. Sometimes, these may need to be treated in the hospital or with dialysis.   Gallbladder problems have happened. Sometimes, people had to be treated in the hospital. In some cases the gallbladder had to be removed. Discuss any questions with the doctor.   If you cannot drink liquids by mouth or if you have upset stomach, throwing up, or diarrhea that does not go away; you need to avoid getting dehydrated. Contact your doctor to find out what to do. Dehydration may lead to new or worse kidney problems.   This drug may prevent other drugs taken by mouth from getting into the body. If you take other drugs by mouth, you may need to take them at some other time than this drug. Talk with your doctor.   Do not share pen or cartridge devices with another person even if the needle has been changed. Sharing these devices may pass infections from one person to another. This includes infections you may not know you have.    Malena Kelley Tell your doctor if you are breast-feeding. You will need to talk about any risks to your baby.  If using for high blood sugar:     Wear disease medical alert ID (identification).   Do not drive if your blood sugar has been low. There is a greater chance of you having a crash.   Check your blood sugar as you have been told by your doctor.   It may be harder to control blood sugar during times of stress such as fever, infection, injury, or surgery. A change in physical activity, exercise, or diet may also affect blood sugar.  If using for weight loss:     If you have high blood sugar (diabetes), you will need to watch your blood sugar closely.   Weight loss during pregnancy may cause harm to the unborn baby. If you get pregnant while taking this drug or if you want to get pregnant, call your doctor right away. What are some side effects that I need to call my doctor about right away?  WARNING/CAUTION: Even though it may be rare, some people may have very bad and sometimes deadly side effects when taking a drug. Tell your doctor or get medical help right away if you have any of the following signs or symptoms that may be related to a very bad side effect:     Signs of an allergic reaction, like rash; hives; itching; red, swollen, blistered, or peeling skin with or without fever; wheezing; tightness in the chest or throat; trouble breathing, swallowing, or talking; unusual hoarseness; or swelling of the mouth, face, lips, tongue, or throat.   Signs of gallbladder problems like pain in the upper right belly area, right shoulder area, or between the shoulder blades; yellow skin or eyes; fever with chills; bloating; or very upset stomach or throwing up.   Signs of kidney problems like unable to pass urine, change in how much urine is passed, blood in the urine, or a big weight gain.   Dizziness or passing out.   Fast or abnormal heartbeat.      New or worse behavior or mood changes like depression or thoughts of suicide.   Slurred speech.   Low blood sugar can happen. The chance may be raised when this drug is used with other drugs for diabetes. Signs may be dizziness, headache, feeling sleepy or weak, shaking, fast heartbeat, confusion, hunger, or sweating. Call your doctor right away if you have any of these signs. Follow what you have been told to do for low blood sugar. This may include taking glucose tablets, liquid glucose, or some fruit juices.   Severe and sometimes deadly pancreas problems (pancreatitis) have happened with this drug. Call your doctor right away if you have severe stomach pain, severe back pain, or severe upset stomach or throwing up. What are some other side effects of this drug?  All drugs may cause side effects. However, many people have no side effects or only have minor side effects. Call your doctor or get medical help if any of these side effects or any other side effects bother you or do not go away:    If using for high blood sugar:     Constipation, diarrhea, upset stomach, throwing up, or feeling less hungry.   Irritation where the shot is given.   Headache.   Nose or throat irritation.   Back pain.  If using for weight loss:     Constipation, diarrhea, stomach pain, upset stomach, throwing up, or feeling less hungry.   Irritation where the shot is given.   Headache.   Feeling tired or weak.  These are not all of the side effects that may occur. If you have questions about side effects, call your doctor. Call your doctor for medical advice about side effects.  You may report side effects to your national health agency. How is this drug best taken?  Use this drug as ordered by your doctor. Read all information given to you. Follow all instructions closely.  For all uses of this drug:     It is given as a shot into the fatty part of the skin on the top of the thigh, belly area, or upper arm.      If you will be giving yourself the shot, your doctor or nurse will teach you how to give the shot. [facility] 84 the site where you give the shot with each shot.   Take with or without food.   Drink lots of noncaffeine liquids unless told to drink less liquid by your doctor.   Do not use if the solution is cloudy, leaking, or has particles.   Do not use if solution changes color. [facility] Wash your hands before and after use.   Prepare pen before first use as you have been told. You will also need to do this if you drop the pen.   This product may make a clicking sound as you prepare the dose. Do not prepare the dose by counting the clicks. Doing so could lead to using the wrong dose.   Remove all pen needle covers before injecting a dose (there may be 2). If you are not sure what type of pen needle you have or how to use it, talk with the doctor.   Keep taking this drug as you have been told by your doctor or other health care provider, even if you feel well.   Throw away needles in a needle/sharp disposal box. Do not reuse needles or other items. When the box is full, follow all local rules for getting rid of it. Talk with a doctor or pharmacist if you have any questions.   Attach new needle before each dose.   Follow the diet and workout plan that your doctor told you about.  If using for high blood sugar:     If you are also using insulin, you may inject this drug and the insulin in the same area of the body but not right next to each other.   Do not mix this drug in the same syringe with insulin. What do I do if I miss a dose?   Skip the missed dose and go back to your normal time.   Do not take 2 doses at the same time or extra doses.   If you miss 3 days of this drug, call your doctor to find out what to do. How do I store and/or throw out this drug?  All products:     Store unopened pens in a refrigerator. Do not freeze.      Do not use if it has been frozen.   Store opened pens at room temperature or in a refrigerator. Do not freeze.   After opening, throw away any part not used after 30 days.   Take off the needle after each shot. Do not store this device with the needle on it.   Protect from heat and light.   Keep the cap on the pen when not in use.   Keep all drugs in a safe place. Keep all drugs out of the reach of children and pets.   Throw away unused or  drugs. Do not flush down a toilet or pour down a drain unless you are told to do so. Check with your pharmacist if you have questions about the best way to throw out drugs. There may be drug take-back programs in your area.  Victoza:     Throw away drug if stored at a temperature above 86°F (30°C). General drug facts   Aure Mendenhall If your symptoms or health problems do not get better or if they become worse, call your doctor.   Do not share your drugs with others and do not take anyone else's drugs.   Some drugs may have another patient information leaflet. If you have any questions about this drug, please talk with your doctor, nurse, pharmacist, or other health care provider.   If you think there has been an overdose, call your poison control center or get medical care right away. Be ready to tell or show what was taken, how much, and when it happened. Last Reviewed [id number]   Consumer Information Use and Disclaimer    This generalized information is a limited summary of diagnosis, treatment, and/or medication information. It is not meant to be comprehensive and should be used as a tool to help the user understand and/or assess potential diagnostic and treatment options. It does NOT include all information about conditions, treatments, medications, side effects, or risks that may apply to a specific patient.  It is not intended to be medical advice or a substitute for the medical advice, diagnosis, or treatment of a health care provider based on the health care provider's examination and assessment of a patient's specific and unique circumstances. Patients must speak with a health care provider for complete information about their health, medical questions, and treatment options, including any risks or benefits regarding use of medications. This information does not endorse any treatments or medications as safe, effective, or approved for treating a specific patient. UpToDate, Inc. and its affiliates disclaim any warranty or liability relating to this information or the use thereof. The use of this information is governed by the Terms of Use, available at EnterprisePages.uy. com/en/know/clinical-effectiveness-terms.  © 2022 UpToDate, Inc. and its affiliates and/or [facility]. All rights reserved.

## 2022-05-26 NOTE — PROGRESS NOTES
Chief Complaint   Patient presents with    Establish Care     Visit Vitals  /84 (BP 1 Location: Left upper arm, BP Patient Position: Sitting, BP Cuff Size: Large adult)   Pulse 69   Temp 97.6 °F (36.4 °C) (Oral)   Resp 16   Ht 5' (1.524 m)   Wt 227 lb 3.2 oz (103.1 kg)   LMP 11/01/2012   SpO2 98%   BMI 44.37 kg/m²         1. \"Have you been to the ER, urgent care clinic since your last visit? Hospitalized since your last visit? \" No    2. \"Have you seen or consulted any other health care providers outside of the [address] [place] [address] since your last visit? \" No     3. For patients aged 39-70: Has the patient had a colonoscopy / FIT/ Cologuard? No      If the patient is female:    4. For patients aged 41-77: Has the patient had a mammogram within the past 2 years? No      5. For patients aged 21-65: Has the patient had a pap smear?  No

## 2022-05-27 PROCEDURE — 90750 HZV VACC RECOMBINANT IM: CPT | Performed by: INTERNAL MEDICINE

## 2022-05-27 PROCEDURE — 90677 PCV20 VACCINE IM: CPT | Performed by: INTERNAL MEDICINE

## 2022-05-27 NOTE — PROGRESS NOTES
After obtaining written consent and per orders of Dr. Natalie Sanchez, injection of [id number] given by Jennifer Bowman, [address] [place] [address]. Order and injection/medication verified by Rosemary Alanis. Patient tolerated procedure well. VIS was given to them. No reactions noted. After obtaining written consent and per orders of Dr. Natalie Sanchez, injection of [facility] given by Jennifer Bowman [facility]. Order and injection/medication verified by Rosemary Alanis. Patient tolerated procedure well. VIS was given to them. No reactions noted.

## 2022-05-31 DIAGNOSIS — K21.9 GASTROESOPHAGEAL REFLUX DISEASE WITHOUT ESOPHAGITIS: ICD-10-CM

## 2022-05-31 RX ORDER — OMEPRAZOLE 40 MG/1
40 CAPSULE, DELAYED RELEASE ORAL DAILY
Qty: 30 CAPSULE | Refills: 11 | Status: SHIPPED | OUTPATIENT
Start: 2022-05-31 | End: 2023-05-26

## 2022-05-31 NOTE — PROGRESS NOTES
Current Outpatient Medications   Medication Sig Dispense Refill    omeprazole (PRILOSEC) 40 mg capsule Take 1 Capsule by mouth daily for 360 days. 30 Capsule 11    liraglutide, weight loss, (SAXENDA) 3 mg/0.5 mL (18 mg/3 mL) pen 0.6 mg daily for 7 days, THEN 1.2 mg daily for 7 days, THEN 1.8 mg daily for 7 days, THEN 2.4 mg daily for 7 days, THEN 3 mg daily for 360 days. 4 Each 11    albuterol (PROVENTIL HFA, VENTOLIN HFA, PROAIR HFA) 90 mcg/actuation inhaler Take 2 Puffs by inhalation every six (6) hours as needed for Wheezing. 1 Each 0    metaxalone (SKELAXIN) 800 mg tablet Take 1 Tablet by mouth two (2) times a day for 30 days. 60 Tablet 0    spironolactone (ALDACTONE) 50 mg tablet Take 1 Tablet by mouth daily. 90 Tablet 3    buPROPion XL (WELLBUTRIN XL) 300 mg XL tablet Take 1 Tablet by mouth daily. 90 Tablet 3    cholecalciferol (VITAMIN D3) (5000 Units/125 mcg) tab tablet Take 1 Tablet by mouth daily for 360 days. 30 Tablet 11    lisinopriL (PRINIVIL, ZESTRIL) 2.5 mg tablet Take 1 Tablet by mouth daily. 90 Tablet 3    pregabalin (LYRICA) 100 mg capsule Take 1 Capsule by mouth two (2) times a day for 360 days. Max Daily Amount: 200 mg. 60 Capsule 11    famotidine (PEPCID) 40 mg tablet Take 1 Tablet by mouth daily. 90 Tablet 3    augmented betamethasone dipropionate (DIPROLENE-AF) 0.05 % topical cream Apply  to affected area two (2) times a day. 15 g 0    levocetirizine (XYZAL) 5 mg tablet Take  by mouth.  IU-SX-IK-Fe-Min-Lycopen-Lutein (CENTRUM) 0.4-162-18 mg Tab Take  by mouth daily.        Past Medical History:   Diagnosis Date    Asthma     SYMPTOM-FREE SINCE 2007    Chronic pain     lower back    Depression     TYPE A DEPRESSION    GERD (gastroesophageal reflux disease)     History of acute sinusitis 04/2022    History of bronchitis 04/2022    History of palpitations     Holter 11/21 - NSR, PVCs (occ), PACs (rare); Cards f/u 1/22 no tx req'd    Hypertension     Vitiligo     VITILIGO HANDS,FACE, [address]       ICD-10-CM ICD-9-CM    1.  Gastroesophageal reflux disease without esophagitis  K21.9 530.81 omeprazole (PRILOSEC) 40 mg capsule

## 2022-06-03 ENCOUNTER — TRANSCRIBE ORDER (OUTPATIENT)
Dept: SCHEDULING | Age: 52
End: 2022-06-03

## 2022-06-03 ENCOUNTER — TRANSCRIBE ORDER (OUTPATIENT)
Dept: GENERAL RADIOLOGY | Age: 52
End: 2022-06-03

## 2022-06-03 ENCOUNTER — HOSPITAL ENCOUNTER (OUTPATIENT)
Dept: GENERAL RADIOLOGY | Age: 52
Discharge: HOME | End: 2022-06-03
Attending: NURSE PRACTITIONER
Payer: COMMERCIAL

## 2022-06-03 DIAGNOSIS — M54.2 CERVICALGIA: Primary | ICD-10-CM

## 2022-06-03 DIAGNOSIS — M54.2 CERVICALGIA: ICD-10-CM

## 2022-06-03 PROCEDURE — 72050 X-RAY EXAM NECK SPINE 4/5VWS: CPT

## 2022-06-30 DIAGNOSIS — J45.20 MILD INTERMITTENT ASTHMA WITHOUT COMPLICATION: ICD-10-CM

## 2022-06-30 RX ORDER — ALBUTEROL SULFATE 90 UG/1
AEROSOL, METERED RESPIRATORY (INHALATION)
Qty: 6.7 EACH | Refills: 5 | Status: SHIPPED | OUTPATIENT
Start: 2022-06-30

## 2022-08-25 NOTE — PROGRESS NOTES
Subjective:     Chief Complaint   Patient presents with    Follow-up       Jaden Noel is a 46 y.o. F.  She has a past medical history of hypertension as well as depression. I reviewed and updated the medical record. I most recently saw this patient in late May for establishment as well as routine follow-up and preventive care. She was feeling generally fine at the time, and had reported completion of her mammography in July of last year. Routine yearly screening was ordered. She was noted to have hypertension, for which spironolactone and lisinopril were felt to be generally effective with her blood pressure at the time 135/84 mmHg. Severe morbid obesity with a BMI of 44 kg/m² was noted. She was continued on her usual medication regimen. For her obesity, we had started Saxenda. She was also referred to nutrition for additional therapy. Today, the patient comes in for routine follow-up and routine preventive care. She reports feeling generally well overall today and has no significant acute somatic complaints. She notes that the insurance company did not cover the JÄRVENPÄÄ, and she has not gone to nutrition as we had previously discussed. She has not been able to lose weight in the interim. This is despite trying her best to adhere to dietary modifications. She otherwise continues on Wellbutrin for her history of depression, and finds this to be helpful, without any suicidal or homicidal ideation. Her mood overall has been stable over the past few months. In addition, she continues on a combination of lisinopril and spironolactone for her hypertension. She denies having had any lightheadedness or dizziness or any muscle cramping with this medication. Her blood pressure readings today are excellent and they are similarly well controlled at home. She has a history of asthma, for which she is only prescribed albuterol.   She has not really had to resort to her albuterol over the past several months. Preventive services are reviewed with the patient today. She is up-to-date with regard to most routine screening examinations though is due for repeat mammography, which is ordered. She typically gets this done at the [facility]. She also continues to follow with her gynecologist for her routine Pap smears. Routine Healthcare Maintenance issues are reviewed and discussed with the patient as noted below. Orders to update gaps in healthcare maintenance were placed as noted below in the Assessment and Plan, where applicable. Past Medical History:  Past Medical History:   Diagnosis Date    Asthma     SYMPTOM-FREE SINCE 2007    Chronic pain     lower back    Class 3 severe obesity due to excess calories with serious comorbidity and body mass index (BMI) of 40.0 to 44.9 in adult [facility])     Depression     TYPE A DEPRESSION    GERD (gastroesophageal reflux disease)     History of acute sinusitis 04/2022    History of bronchitis 04/2022    History of palpitations     Holter 11/21 - NSR, PVCs (occ), PACs (rare); Cards f/u 1/22 no tx req'd    Hypertension     Vitamin D deficiency     Vitiligo     VITILIGO HANDS,FACE, JOINTS       Past Surgical Histor:  Past Surgical History:   Procedure Laterality Date    HX CHOLECYSTECTOMY      HX GYN  7/09    NOVASURE ABLATION    HX GYN      D & C    HX HYSTERECTOMY      2012    HX TONSILLECTOMY  AGE 7       Allergies: Allergies   Allergen Reactions    Sulfa (Sulfonamide Antibiotics) Hives    Sulfa Dyne Hives and Itching       Medications:  Current Outpatient Medications   Medication Sig Dispense Refill    albuterol (PROVENTIL HFA, VENTOLIN HFA, PROAIR HFA) 90 mcg/actuation inhaler INHALE 2 PUFFS EVERY 6 HOURS AS NEEDED FOR WHEEZE 6.7 Each 5    omeprazole (PRILOSEC) 40 mg capsule Take 1 Capsule by mouth daily for 360 days. 30 Capsule 11    spironolactone (ALDACTONE) 50 mg tablet Take 1 Tablet by mouth daily.  90 Tablet 3    buPROPion XL (WELLBUTRIN XL) 300 mg XL tablet Take 1 Tablet by mouth daily. 90 Tablet 3    cholecalciferol (VITAMIN D3) (5000 Units/125 mcg) tab tablet Take 1 Tablet by mouth daily for 360 days. 30 Tablet 11    lisinopriL (PRINIVIL, ZESTRIL) 2.5 mg tablet Take 1 Tablet by mouth daily. 90 Tablet 3    pregabalin (LYRICA) 100 mg capsule Take 1 Capsule by mouth two (2) times a day for 360 days. Max Daily Amount: 200 mg. 60 Capsule 11    famotidine (PEPCID) 40 mg tablet Take 1 Tablet by mouth daily. 90 Tablet 3    augmented betamethasone dipropionate (DIPROLENE-AF) 0.05 % topical cream Apply  to affected area two (2) times a day. 15 g 0    levocetirizine (XYZAL) 5 mg tablet Take  by mouth. EZ-QA-ER-Fe-Min-Lycopen-Lutein 0.4-162-18 mg tab Take  by mouth daily. Social History:  Social History     Socioeconomic History    Marital status:    Tobacco Use    Smoking status: Never    Smokeless tobacco: Never   Vaping Use    Vaping Use: Never used   Substance and Sexual Activity    Alcohol use:  Yes     Alcohol/week: 3.0 standard drinks     Types: 3 Shots of liquor per week     Comment: on     Drug use: No    Sexual activity: Yes     Partners: Male     Birth control/protection: Surgical       Family History:  Family History   Problem Relation Age of Onset    Diabetes Mother     Thyroid Disease Mother     Other Mother         MVP    Hypertension Mother     Heart Disease Mother     Cancer Father         MULTIPLE SKIN, SARCOMA    Hypertension Father     Heart Attack Maternal Grandfather     Cancer Paternal Grandfather          OF LUNG CA    Ulcerative Colitis Son        Immunizations:  Immunization History   Administered Date(s) Administered    COVID-19, MODERNA Booster BLUE border, (age 18y+), IM, 50mcg/0.25mL 2021, 2021, 10/28/2021    Influenza Vaccine 10/28/2021    Pneumococcal Conjugate PCV20, PF (Prevnar 20) 2022    Tdap 2016    Zoster Recombinant 2022        Healthcare Maintenance:  Health Maintenance   Topic Date Due    COVID-19 Vaccine (1) 1970    Shingrix Vaccine Age 50> (2 of 2) 07/22/2022    Breast Cancer Screen Mammogram  07/28/2022    Flu Vaccine (1) 09/01/2022    Depression Monitoring  05/26/2023    Colorectal Cancer Screening Combo  11/01/2023    Lipid Screen  03/02/2026    DTaP/Tdap/Td series (2 - Td or Tdap) 11/14/2026    Hepatitis C Screening  Completed    Pneumococcal 0-64 years  Completed        Review of Systems:  ROS:  Review of Systems   Constitutional: Negative. HENT: Negative. Eyes: Negative. Respiratory: Negative. Cardiovascular: Negative. Gastrointestinal: Negative. Genitourinary: Negative. Musculoskeletal: Negative. Skin: Negative. Neurological: Negative. Endo/Heme/Allergies: Negative. Psychiatric/Behavioral: Negative. ROS otherwise negative      Objective:     Vital Signs:  Visit Vitals  /82 (BP 1 Location: Right arm, BP Patient Position: Sitting, BP Cuff Size: Large adult)   Pulse 74   Temp 97.6 °F (36.4 °C) (Oral)   Resp 16   Ht 5' (1.524 m)   Wt 227 lb 6.4 oz (103.1 kg)   LMP 11/01/2012   SpO2 98%   BMI 44.41 kg/m²       BMI:  Body mass index is 44.41 kg/m². Physical Examination:  Physical Exam  Constitutional:       Appearance: Normal appearance. She is normal weight. HENT:      Head: Normocephalic and atraumatic. Right Ear: External ear normal.      Left Ear: External ear normal.      Nose: Nose normal.      Mouth/Throat:      Mouth: Mucous membranes are moist.      Pharynx: Oropharynx is clear. No oropharyngeal exudate or posterior oropharyngeal erythema. Cardiovascular:      Rate and Rhythm: Normal rate and regular rhythm. Pulses: Normal pulses. Heart sounds: Normal heart sounds. No murmur heard. No friction rub. No gallop. Pulmonary:      Effort: Pulmonary effort is normal. No respiratory distress. Breath sounds: Normal breath sounds. No wheezing, rhonchi or rales.    Abdominal: General: Abdomen is flat. Bowel sounds are normal. There is no distension. Palpations: Abdomen is soft. Tenderness: There is no abdominal tenderness. There is no guarding. Musculoskeletal:         General: No swelling, tenderness or deformity. Normal range of motion. Cervical back: Normal range of motion and neck supple. No rigidity or tenderness. Skin:     General: Skin is warm and dry. Findings: No erythema, lesion or rash. Comments: Chronic vitiligo changes   Neurological:      General: No focal deficit present. Mental Status: She is alert and oriented to person, place, and time. Mental status is at baseline. Sensory: No sensory deficit. Motor: No weakness. Gait: Gait normal.      Deep Tendon Reflexes: Reflexes normal.   Psychiatric:         Mood and Affect: Mood normal.         Behavior: Behavior normal.         Judgment: Judgment normal.        Physical exam otherwise negative    Diagnostic Testing:    Laboratory Studies:  Office Visit on 11/09/2021   Component Date Value Ref Range Status    T4, Free 11/09/2021 0.9  0.8 - 1.5 NG/DL Final    TSH 11/09/2021 1.39  0.36 - 3.74 uIU/mL Final    Comment:   Due to TSH heterogeneity, both structurally and degree of glycosylation,  monoclonal antibodies used in the TSH assay may not accurately quantitate TSH. Therefore, this result should be correlated with clinical findings as well as  with other assessments of thyroid function, e.g., free T4, free T3.       Sodium 11/09/2021 139  136 - 145 mmol/L Final    Potassium 11/09/2021 4.6  3.5 - 5.1 mmol/L Final    Chloride 11/09/2021 107  97 - 108 mmol/L Final    CO2 11/09/2021 28  21 - 32 mmol/L Final    Anion gap 11/09/2021 4 (A) 5 - 15 mmol/L Final    Glucose 11/09/2021 85  65 - 100 mg/dL Final    BUN 11/09/2021 18  6 - 20 MG/DL Final    Creatinine 11/09/2021 0.96  0.55 - 1.02 MG/DL Final    BUN/Creatinine ratio 11/09/2021 19  12 - 20   Final    GFR est AA 11/09/2021 >60  >60 ml/min/1.73m2 Final    GFR est non-AA 11/09/2021 >60  >60 ml/min/1.73m2 Final    Comment: Estimated GFR is calculated using the IDMS-traceable Modification of Diet in  Renal Disease (MDRD) Study equation, reported for both  Americans  (GFRAA) and non- Americans (GFRNA), and normalized to 1.73m2 body  surface area. The physician must decide which value applies to the patient. Calcium 11/09/2021 9.4  8.5 - 10.1 MG/DL Final    WBC 11/09/2021 7.8  3.6 - 11.0 K/uL Final    RBC 11/09/2021 4.59  3.80 - 5.20 M/uL Final    HGB 11/09/2021 14.2  11.5 - 16.0 g/dL Final    HCT 11/09/2021 43.4  35.0 - 47.0 % Final    MCV 11/09/2021 94.6  80.0 - 99.0 FL Final    MCH 11/09/2021 30.9  26.0 - 34.0 PG Final    MCHC 11/09/2021 32.7  30.0 - 36.5 g/dL Final    RDW 11/09/2021 12.9  11.5 - 14.5 % Final    PLATELET 14/10/7587 952  150 - 400 K/uL Final    MPV 11/09/2021 10.2  8.9 - 12.9 FL Final    NRBC 11/09/2021 0.0  0  WBC Final    ABSOLUTE NRBC 11/09/2021 0.00  0.00 - 0.01 K/uL Final    NEUTROPHILS 11/09/2021 63  32 - 75 % Final    LYMPHOCYTES 11/09/2021 23  12 - 49 % Final    MONOCYTES 11/09/2021 8  5 - 13 % Final    EOSINOPHILS 11/09/2021 5  0 - 7 % Final    BASOPHILS 11/09/2021 1  0 - 1 % Final    IMMATURE GRANULOCYTES 11/09/2021 0  0.0 - 0.5 % Final    ABS. NEUTROPHILS 11/09/2021 4.9  1.8 - 8.0 K/UL Final    ABS. LYMPHOCYTES 11/09/2021 1.8  0.8 - 3.5 K/UL Final    ABS. MONOCYTES 11/09/2021 0.6  0.0 - 1.0 K/UL Final    ABS. EOSINOPHILS 11/09/2021 0.4  0.0 - 0.4 K/UL Final    ABS. BASOPHILS 11/09/2021 0.1  0.0 - 0.1 K/UL Final    ABS. IMM. GRANS. 11/09/2021 0.0  0.00 - 0.04 K/UL Final    DF 11/09/2021 AUTOMATED    Final   Office Visit on 10/09/2021   Component Date Value Ref Range Status    SARS-CoV-2, KATHARINE 10/09/2021 Not Detected  Not Detected Final    Comment: This nucleic acid amplification test was developed and its performance  characteristics determined by [facility].  Nucleic acid  amplification tests include RT-PCR and TMA. This test has not been  FDA cleared or approved. This test has been authorized by FDA under  an Emergency Use Authorization (EUA). This test is only authorized  for the duration of time the declaration that circumstances exist  justifying the authorization of the emergency use of in vitro  diagnostic tests for detection of SARS-CoV-2 virus and/or diagnosis  of COVID-19 infection under section 564(b)(1) of the Act, 21 U. S.C.  068DYJ-9(U) (1), unless the authorization is terminated or revoked  sooner. When diagnostic testing is negative, the possibility of a false  negative result should be considered in the context of a patient's  recent exposures and the presence of clinical signs and symptoms  consistent with COVID-19. An individual without symptoms of COVID-19  and who is not shedding SARS-CoV-2 virus wo                           uld expect to have a  negative (not detected) result in this assay. SARS-CoV-2, KATHARINE 2 DAY TAT 10/09/2021 Performed   Final         Radiographic Studies:  XR Results (most recent):  Results from [place] encounter on 06/03/22    XR SPINE CERV 4 OR 5 V    Narrative  Indication: Neck pain. A total of 6 views of the cervical spine was performed. There is some  straightening of the normal cervical lordosis. There is mild degenerative disc  disease at C5-6 and C6-7. The neural foramen are patent. Prevertebral soft  tissues are normal.    No significant facet disease. Impression  1. Mild degenerative disc disease at C5-6 and C6-7. [facility] Results (most recent):  Results from [place] encounter on 09/16/15    [facility] MAMMO RT DX DIGTL    Narrative  **Final Report**      ICD Codes / Adm. Diagnosis: V76.12  575.6 / add views  Examination:  MM MAMMO DIG DX [facility] RT  - [id number] - Sep 16 2015  2:38PM  Accession No:  [id number]  Reason:  add views      REPORT:  History: Abnormal mammogram.    Unilateral right digital diagnostic mammography, interpreted in conjunction  with CAD over-read,  was performed. The breast parenchymal pattern is  scattered fibroglandular densities. Asymmetry in the upper right breast is  not significantly changed when compared to multiple prior examinations. No  new masses or suspicious microcalcifications are identified. Impression  :  1. BI-RADS category 2, benign. 2. The patient should return in one year for routine bilateral mammography. 3. She was informed. Signing/Reading Doctor: Vicky Bennett ([id number])  ApprovedLeilani Kehr ([id number])  Sep 16 2015  3:02PM     CT Results (most recent):  Results from Hospital Encounter encounter on 04/27/21    CT ABD PELV W CONT    Narrative  EXAM: CT ABD PELV W CONT    INDICATION: Diarrhea x9 days    COMPARISON: None    CONTRAST: 100 mL of Isovue-370. TECHNIQUE:  Following the uneventful intravenous administration of contrast, thin axial  images were obtained through the abdomen and pelvis. Coronal and sagittal  reconstructions were generated. Oral contrast was not administered. CT dose  reduction was achieved through use of a standardized protocol tailored for this  examination and automatic exposure control for dose modulation. FINDINGS:  LOWER THORAX: No significant abnormality in the incidentally imaged lower chest.  LIVER: No mass. BILIARY TREE: Prior cholecystectomy. CBD is not dilated. SPLEEN: within normal limits. PANCREAS: No mass or ductal dilatation. ADRENALS: Unremarkable. KIDNEYS: No mass, calculus, or hydronephrosis. STOMACH: Mild thickening of the distal stomach. SMALL BOWEL: No dilatation or wall thickening. COLON: No dilatation or wall thickening. APPENDIX: Normal on coronal image 42  PERITONEUM: No ascites or pneumoperitoneum. RETROPERITONEUM: No lymphadenopathy or aortic aneurysm. REPRODUCTIVE ORGANS: Prior hysterectomy  URINARY BLADDER: No mass or calculus. BONES: No destructive bone lesion.  Erosive changes at the L5-S1 levels  bilaterally. 5.6 mm anterolisthesis of L5 relative to the sacrum. ABDOMINAL WALL: No mass or hernia. ADDITIONAL COMMENTS: N/A    Impression  Mild gastritis  No focal bowel process identified  Grade 1 anterolisthesis of L4 on a degenerative basis     DEXA Results (most recent):  No results found for this or any previous visit. MRI Results (most recent):  Results from [place] encounter on 12/18/20    MRI LUMB SPINE WO CONT    Narrative  EXAM: MRI LUMB SPINE WO CONT    INDICATION: Low back pain, hysterectomy. COMPARISON: None    TECHNIQUE: MR imaging of the lumbar spine was performed using the following  sequences: sagittal T1, T2, STIR;  axial T1, T2.    CONTRAST:  None. FINDINGS:    5 mm grade 1 anterolisthesis of L5-S1. Facet arthrosis, no spondylolysis. Vertebral body heights are maintained. Marrow signal is normal. Mild disc  desiccation at L5-S1. The conus medullaris terminates at L1-L2. Signal and caliber of the distal  spinal cord are within normal limits. The paraspinal soft tissues are within normal limits. Lower thoracic spine: No herniation or stenosis. L1-L2: Diffuse disc bulge. No stenosis. L2-L3: No herniation or stenosis. L3-L4: Diffuse disc bulge, facet arthrosis, and thickened ligamentum flavum. Mild central spinal canal stenosis. L4-L5: Diffuse disc bulge, facet arthrosis, and thickened ligamentum flavum. No  stenosis. L5-S1: Uncovered disc. Facet arthrosis. No stenosis. Impression  IMPRESSION:    1. L3-L4 mild central spinal canal stenosis. 2. L5-S1 grade 1 anterolisthesis due to facet arthrosis. Assessment/Plan:       ICD-10-CM ICD-9-CM    1. Encounter for screening mammogram for malignant neoplasm of breast  Z12.31 V76.12 JONAH MAMMO BI SCREENING INCL CAD      2.  Routine adult health maintenance  Z00.00 V70.0 LIPID PANEL      LIPID PANEL      3. Class 3 severe obesity due to excess calories with serious comorbidity and body mass index (BMI) of 40.0 to 44.9 in adult ([facility])  E66.01 278.01 CBC WITH AUTOMATED DIFF    Z68.41 V85.41 CBC WITH AUTOMATED DIFF      4. Essential hypertension  B78 179.1 METABOLIC PANEL, COMPREHENSIVE      MICROALBUMIN, UR, RAND W/ MICROALB/CREAT RATIO      METABOLIC PANEL, COMPREHENSIVE      MICROALBUMIN, UR, RAND W/ MICROALB/CREAT RATIO      5. Other depression  F32.89 311       6. Mild intermittent asthma without complication  Y68.07 804.65                  Follow-up and Dispositions    Return in about 6 months (around 2/28/2023). Healthcare Maintenance:  - Preventive measures are reviewed as per above  - Up to date on routine interventions except as noted above  - Orders placed to update gaps as noted  - Notes: Fasting labs ordered, MMG ordered. CCM otherwise. Obesity:   - Body mass index is 44.41 kg/m².    - Current Obesity Medications (if any):   Key Obesity Meds               spironolactone (ALDACTONE) 50 mg tablet (Taking) Take 1 Tablet by mouth daily.           - Discussed therapeutic lifestyle changes: dietary modifications, caloric restriction, increased aerobic exercise, resistance training   - Patient adherent to dietary modifications x 6 months (e.g., Mediterranean Diet): YES   - Previous Nutrition Referral or formal weight loss plan x 6 m: NO   - Comorbidities:   - Obstructive sleep apnea: NO   - Obesity-related hypoventilation:NO    - Diabetes Mellitus:NO   - Hypertension:YES   - Other (GERD, OA, back pain):NO   - Qualifies for medication or bariatric surgery referral? NO   - Notes: Advised completion of nutrition referral as previously placed with consideration for Romana Alla again in 6 months if this should be unsuccesssful in helping her achieve weight loss goal.       Essential Hypertension/Blood Pressure Management:   - Home BP Readings: usual 120/80   - Current Control: optimal   - Target BP: <140/90 mmHg   - Relevant BP Meds:  Key CAD CHF Meds               spironolactone (ALDACTONE) 50 mg tablet (Taking) Take 1 Tablet by mouth daily. lisinopriL (PRINIVIL, ZESTRIL) 2.5 mg tablet (Taking) Take 1 Tablet by mouth daily.               - Plan: continue current treatment regimen, continue current meds, dietary sodium restriction, regular aerobic exercise, weight loss   - Notes: labs as above    Anxiety/Depression:   - Current PHQ: No data recorded    - Current RX:   Key Psychotherapeutic Meds               buPROPion XL (WELLBUTRIN XL) 300 mg XL tablet (Taking) Take 1 Tablet by mouth daily.           - Psychology/Psychiatry Co-managing? No   - well controlled without red flag smyptoms, no side effects from medications, continue bupropion    Asthma:   - Current Symptoms: taking medications as instructed, no medication side effects noted, no significant ongoing wheezing or shortness of breath, using bronchodilator MDI less than twice a week   - CAT/ACT: ---   - MMRC: ---   - Current Assessment: mild intermittent   - Current Regimen:   Key COPD Medications               albuterol (PROVENTIL HFA, VENTOLIN HFA, PROAIR HFA) 90 mcg/actuation inhaler (Taking) INHALE 2 PUFFS EVERY 6 HOURS AS NEEDED FOR WHEEZE           - Plan: current treatment plan is effective, no change in therapy          I have reviewed the patient's medical history in detail and updated the computerized patient record. We had a prolonged discussion about these complex clinical issues and went over the various important aspects to consider. All questions were answered. Advised the patient to call back or return to office if symptoms do not improve, change in nature, or persist.     The patient was given an after visit summary or informed of [employer] [facility] which includes patient instructions, diagnoses, current medications, & vitals. she expressed understanding with the diagnosis and plan. Sabiha Matias MD    Please note that this dictation was completed with [email or web address], the [employer] voice recognition software.   Quite often unanticipated grammatical, syntax, homophones, and other interpretive errors are inadvertently transcribed by the computer software. Please disregard these errors. Please excuse any errors that have escaped final proofreading.

## 2022-08-30 ENCOUNTER — OFFICE VISIT (OUTPATIENT)
Dept: INTERNAL MEDICINE CLINIC | Age: 52
End: 2022-08-30
Payer: COMMERCIAL

## 2022-08-30 VITALS
BODY MASS INDEX: 44.65 KG/M2 | SYSTOLIC BLOOD PRESSURE: 125 MMHG | OXYGEN SATURATION: 98 % | HEIGHT: 60 IN | RESPIRATION RATE: 16 BRPM | WEIGHT: 227.4 LBS | HEART RATE: 74 BPM | DIASTOLIC BLOOD PRESSURE: 82 MMHG | TEMPERATURE: 97.6 F

## 2022-08-30 DIAGNOSIS — Z12.31 ENCOUNTER FOR SCREENING MAMMOGRAM FOR MALIGNANT NEOPLASM OF BREAST: Primary | ICD-10-CM

## 2022-08-30 DIAGNOSIS — I10 ESSENTIAL HYPERTENSION: ICD-10-CM

## 2022-08-30 DIAGNOSIS — F32.89 OTHER DEPRESSION: ICD-10-CM

## 2022-08-30 DIAGNOSIS — J45.20 MILD INTERMITTENT ASTHMA WITHOUT COMPLICATION: ICD-10-CM

## 2022-08-30 DIAGNOSIS — E66.01 CLASS 3 SEVERE OBESITY DUE TO EXCESS CALORIES WITH SERIOUS COMORBIDITY AND BODY MASS INDEX (BMI) OF 40.0 TO 44.9 IN ADULT (HCC): ICD-10-CM

## 2022-08-30 DIAGNOSIS — Z00.00 ROUTINE ADULT HEALTH MAINTENANCE: ICD-10-CM

## 2022-08-30 PROCEDURE — 99214 OFFICE O/P EST MOD 30 MIN: CPT | Performed by: INTERNAL MEDICINE

## 2022-08-30 PROCEDURE — 99396 PREV VISIT EST AGE 40-64: CPT | Performed by: INTERNAL MEDICINE

## 2022-08-30 NOTE — PROGRESS NOTES
Chief Complaint   Patient presents with    Follow-up     1. \"Have you been to the ER, urgent care clinic since your last visit? Hospitalized since your last visit? \" No    2. \"Have you seen or consulted any other health care providers outside of the [address] [place] [address] since your last visit? \" No     3. For patients aged 39-70: Has the patient had a colonoscopy / FIT/ Cologuard? No      If the patient is female:    4. For patients aged 41-77: Has the patient had a mammogram within the past 2 years? No      5. For patients aged 21-65: Has the patient had a pap smear?  No

## 2022-08-30 NOTE — PATIENT INSTRUCTIONS
Learning About the [facility] Diet  What is the Mediterranean diet? The Mediterranean diet is a style of eating rather than a diet plan. It features foods eaten in [place], [place], [place] and [place], and other countries along the [facility]. It emphasizes eating foods like fish, fruits, vegetables, beans, high-fiber breads and whole grains, nuts, and olive oil. This style of eating includes limited red meat, cheese, and sweets. Why choose the Mediterranean diet? A Mediterranean-style diet may improve heart health. It contains more fat than other heart-healthy diets. But the fats are mainly from nuts, unsaturated oils (such as fish oils and olive oil), and certain nut or seed oils (such as canola, soybean, or flaxseed oil). These fats may help protect the heart and blood vessels. How can you get started on the Mediterranean diet? Here are some things you can do to switch to a more Mediterranean way of eating. What to eat  Eat a variety of fruits and vegetables each day, such as grapes, blueberries, tomatoes, broccoli, peppers, figs, olives, spinach, eggplant, beans, lentils, and chickpeas. Eat a variety of whole-grain foods each day, such as oats, brown rice, and whole wheat bread, pasta, and couscous. Eat fish at least 2 times a week. Try tuna, salmon, mackerel, lake trout, herring, or sardines. Eat moderate amounts of low-fat dairy products, such as milk, cheese, or yogurt. Eat moderate amounts of poultry and eggs. Choose healthy (unsaturated) fats, such as nuts, olive oil, and certain nut or seed oils like canola, soybean, and flaxseed. Limit unhealthy (saturated) fats, such as butter, palm oil, and coconut oil. And limit fats found in animal products, such as meat and dairy products made with whole milk. Try to eat red meat only a few times a month in very small amounts. Limit sweets and desserts to only a few times a week. This includes sugar-sweetened drinks like soda.   The Mediterranean diet may also include red wine with your meal--1 glass each day for women and up to 2 glasses a day for men. Tips for eating at home  Use herbs, spices, garlic, lemon zest, and citrus juice instead of salt to add flavor to foods. Add avocado slices to your sandwich instead of carrasco. Have fish for lunch or dinner instead of red meat. Brush the fish with olive oil, and broil or grill it. Sprinkle your salad with seeds or nuts instead of cheese. Cook with olive or canola oil instead of butter or oils that are high in saturated fat. Switch from 2% milk or whole milk to 1% or fat-free milk. Dip raw vegetables in a vinaigrette dressing or hummus instead of dips made from mayonnaise or sour cream.  Have a piece of fruit for dessert instead of a piece of cake. Try baked apples, or have some dried fruit. Tips for eating out  Try broiled, grilled, baked, or poached fish instead of having it fried or breaded. Ask your  to have your meals prepared with olive oil instead of butter. Order dishes made with marinara sauce or sauces made from olive oil. Avoid sauces made from cream or mayonnaise. Choose whole-grain breads, whole wheat pasta and pizza crust, brown rice, beans, and lentils. Cut back on butter or margarine on bread. Instead, you can dip your bread in a small amount of olive oil. Ask for a side salad or grilled vegetables instead of french fries or chips. Where can you learn more? Go to http://www.thompson.com/  Enter O407 in the search box to learn more about \"Learning About the Mediterranean Diet. \"  Current as of: September 8, 2021               Content Version: 13.2  © 5476-1333 Healthwise, Incorporated. Care instructions adapted under license by [employer] (which disclaims liability or warranty for this information).  If you have questions about a medical condition or this instruction, always ask your healthcare professional. Selina Hartman, [facility] disclaims any warranty or liability for your use of this information. Mammogram: About This Test  What is it? A mammogram is an X-ray of the breast that is used to screen for breast cancer. This test can find tumors that are too small for you or your doctor to feel. Cancer is most easily treated when it is found at an early stage. Why is this test done? A mammogram is done to:  Look for breast cancer when there are no symptoms. Find breast cancer when there are symptoms. Symptoms of breast cancer may include a lump or thickening in the breast, nipple discharge, or dimpling of the skin on one area of the breast.  Find an area of suspicious breast tissue to remove for an exam under a microscope (biopsy). How do you prepare for the test?  If you've had a mammogram before at another clinic, have the results sent or bring them with you to your appointment. On the day of the mammogram, don't use any deodorant. And don't use perfume, powders, or ointments near or on your breasts. The residue left on your skin by these substances may interfere with the X-rays. How is the test done? You will need to take off any jewelry that might interfere with the X-ray pictures. You will need to take off your clothes above the waist.  You will be given a cloth or paper gown to use during the test.  You probably will stand during the mammogram.  One at a time, your breasts will be placed on a flat plate. Another plate is then pressed firmly against your breast to help flatten out the breast tissue. You may be asked to lift your arm. For a few seconds while the X-ray picture is being taken, you will need to hold your breath. At least two pictures are taken of each breast. One is taken from the top and one from the side. How does having a mammogram feel? A mammogram is often uncomfortable but rarely painful.  If you have sensitive or fragile skin or a skin condition, let the technician know before you have your exam. If you have menstrual periods, the procedure is more comfortable when done within 2 weeks after your period has ended. Having your breasts flattened is usually uncomfortable, but it helps the technician get the best images. How long does the test take? The test will take about 10 to 15 minutes. You may be in the clinic for up to an hour. You may be asked to wait a few minutes while the images are checked to make sure they don't need to be redone. What happens after the test?  You will probably be able to go home right away. You can go back to your usual activities right away. Follow-up care is a key part of your treatment and safety. Be sure to make and go to all appointments, and call your doctor if you are having problems. It's also a good idea to keep a list of the medicines you take. Ask your doctor when you can expect to have your test results. Where can you learn more? Go to http://www.thompson.com/  Enter Z238 in the search box to learn more about \"Mammogram: About This Test.\"  Current as of: September 8, 2021               Content Version: 13.2  © 3931-2793 Healthwise, Incorporated. Care instructions adapted under license by [employer] (which disclaims liability or warranty for this information). If you have questions about a medical condition or this instruction, always ask your healthcare professional. [address] any warranty or liability for your use of this information.

## 2022-08-31 LAB
ALBUMIN SERPL-MCNC: 4.2 G/DL (ref 3.5–5)
ALBUMIN/GLOB SERPL: 1.5 {RATIO} (ref 1.1–2.2)
ALP SERPL-CCNC: 171 U/L (ref 45–117)
ALT SERPL-CCNC: 30 U/L (ref 12–78)
ANION GAP SERPL CALC-SCNC: 4 MMOL/L (ref 5–15)
AST SERPL-CCNC: 16 U/L (ref 15–37)
BASOPHILS # BLD: 0.1 K/UL (ref 0–0.1)
BASOPHILS NFR BLD: 1 % (ref 0–1)
BILIRUB SERPL-MCNC: 0.3 MG/DL (ref 0.2–1)
BUN SERPL-MCNC: 14 MG/DL (ref 6–20)
BUN/CREAT SERPL: 14 (ref 12–20)
CALCIUM SERPL-MCNC: 9.1 MG/DL (ref 8.5–10.1)
CHLORIDE SERPL-SCNC: 106 MMOL/L (ref 97–108)
CHOLEST SERPL-MCNC: 225 MG/DL
CO2 SERPL-SCNC: 28 MMOL/L (ref 21–32)
CREAT SERPL-MCNC: 1.01 MG/DL (ref 0.55–1.02)
CREAT UR-MCNC: 60 MG/DL
DIFFERENTIAL METHOD BLD: ABNORMAL
EOSINOPHIL # BLD: 0.5 K/UL (ref 0–0.4)
EOSINOPHIL NFR BLD: 7 % (ref 0–7)
ERYTHROCYTE [DISTWIDTH] IN BLOOD BY AUTOMATED COUNT: 13.1 % (ref 11.5–14.5)
GLOBULIN SER CALC-MCNC: 2.8 G/DL (ref 2–4)
GLUCOSE SERPL-MCNC: 91 MG/DL (ref 65–100)
HCT VFR BLD AUTO: 44.8 % (ref 35–47)
HDLC SERPL-MCNC: 63 MG/DL
HDLC SERPL: 3.6 {RATIO} (ref 0–5)
HGB BLD-MCNC: 14.6 G/DL (ref 11.5–16)
IMM GRANULOCYTES # BLD AUTO: 0 K/UL (ref 0–0.04)
IMM GRANULOCYTES NFR BLD AUTO: 1 % (ref 0–0.5)
LDLC SERPL CALC-MCNC: 134.8 MG/DL (ref 0–100)
LYMPHOCYTES # BLD: 1.6 K/UL (ref 0.8–3.5)
LYMPHOCYTES NFR BLD: 24 % (ref 12–49)
MCH RBC QN AUTO: 30.5 PG (ref 26–34)
MCHC RBC AUTO-ENTMCNC: 32.6 G/DL (ref 30–36.5)
MCV RBC AUTO: 93.5 FL (ref 80–99)
MICROALBUMIN UR-MCNC: <0.5 MG/DL
MICROALBUMIN/CREAT UR-RTO: <8 MG/G (ref 0–30)
MONOCYTES # BLD: 0.6 K/UL (ref 0–1)
MONOCYTES NFR BLD: 9 % (ref 5–13)
NEUTS SEG # BLD: 3.9 K/UL (ref 1.8–8)
NEUTS SEG NFR BLD: 58 % (ref 32–75)
NRBC # BLD: 0 K/UL (ref 0–0.01)
NRBC BLD-RTO: 0 PER 100 WBC
PLATELET # BLD AUTO: 266 K/UL (ref 150–400)
PMV BLD AUTO: 10.3 FL (ref 8.9–12.9)
POTASSIUM SERPL-SCNC: 4.9 MMOL/L (ref 3.5–5.1)
PROT SERPL-MCNC: 7 G/DL (ref 6.4–8.2)
RBC # BLD AUTO: 4.79 M/UL (ref 3.8–5.2)
SODIUM SERPL-SCNC: 138 MMOL/L (ref 136–145)
TRIGL SERPL-MCNC: 136 MG/DL (ref ?–150)
VLDLC SERPL CALC-MCNC: 27.2 MG/DL
WBC # BLD AUTO: 6.6 K/UL (ref 3.6–11)

## 2022-08-31 NOTE — PROGRESS NOTES
10-year Cardiovascular Risk Roxann Bridges 2013):   The 10-year ASCVD risk score (Lazara Frederick, et al., 2013) is: 1.9%    Values used to calculate the score:      Age: 46 years      Sex: Female      Is Non- : No      Diabetic: No      Tobacco smoker: No      Systolic Blood Pressure: 609 mmHg      Is BP treated: Yes      HDL Cholesterol: 63 MG/DL      Total Cholesterol: 225 MG/DL

## 2022-08-31 NOTE — PROGRESS NOTES
Please call the patient regarding her diagnostic evaluation. High cholesterol above target < 100 mg/dL. ASCVD risk remains low enough to not warrant specific pharmacotherapy. Recommend weight loss. Renal function stable to slightly impaired as before. CKD G3A. Her Alk Phos is elevated. Not clear why. Repeat CMP in 2 weeks. If persistent will order RUQ US; suspect fatty liver disease.

## 2022-09-13 ENCOUNTER — LAB ONLY (OUTPATIENT)
Dept: INTERNAL MEDICINE CLINIC | Age: 52
End: 2022-09-13

## 2022-09-13 DIAGNOSIS — R74.8 ALKALINE PHOSPHATASE ELEVATION: Primary | ICD-10-CM

## 2022-09-13 LAB
ALBUMIN SERPL-MCNC: 4.2 G/DL (ref 3.5–5)
ALBUMIN/GLOB SERPL: 1.4 {RATIO} (ref 1.1–2.2)
ALP SERPL-CCNC: 156 U/L (ref 45–117)
ALT SERPL-CCNC: 29 U/L (ref 12–78)
ANION GAP SERPL CALC-SCNC: 7 MMOL/L (ref 5–15)
AST SERPL-CCNC: 16 U/L (ref 15–37)
BILIRUB SERPL-MCNC: 0.4 MG/DL (ref 0.2–1)
BUN SERPL-MCNC: 13 MG/DL (ref 6–20)
BUN/CREAT SERPL: 13 (ref 12–20)
CALCIUM SERPL-MCNC: 9.4 MG/DL (ref 8.5–10.1)
CHLORIDE SERPL-SCNC: 105 MMOL/L (ref 97–108)
CO2 SERPL-SCNC: 26 MMOL/L (ref 21–32)
CREAT SERPL-MCNC: 1 MG/DL (ref 0.55–1.02)
GLOBULIN SER CALC-MCNC: 2.9 G/DL (ref 2–4)
GLUCOSE SERPL-MCNC: 79 MG/DL (ref 65–100)
POTASSIUM SERPL-SCNC: 4.2 MMOL/L (ref 3.5–5.1)
PROT SERPL-MCNC: 7.1 G/DL (ref 6.4–8.2)
SODIUM SERPL-SCNC: 138 MMOL/L (ref 136–145)

## 2022-09-14 ENCOUNTER — TELEPHONE (OUTPATIENT)
Dept: INTERNAL MEDICINE CLINIC | Age: 52
End: 2022-09-14

## 2022-09-14 NOTE — TELEPHONE ENCOUNTER
Patient had an ultrasound years ago and her alk. Phos was elevated. Patient wants to know if the reason why her alk. Phos because she doesn't absorb vitamin d. Patient is currently taking 5000 iu daily.

## 2022-09-14 NOTE — PROGRESS NOTES
Please call the patient regarding her diagnostic evaluation. Persistent but faintly improved alkaline phosphatase elevation. It is possible that this will continue to resolve on its own. The cause of this is not clear. Note persistent mild renal insufficiency, CKD G3A with GFR 58. Given minimal elevation of Alk Phos as well as patient asymptomatic status, will defer additional workup for now but advise repeat labs in a couple months; if repeat CMP at that time still shows elevated Alk Phos then would obtain GGT, bone specific alk phos, and RUQ US.

## 2022-09-23 LAB — HBA1C MFR BLD HPLC: 5.5 %

## 2022-10-17 ENCOUNTER — OFFICE VISIT (OUTPATIENT)
Dept: URGENT CARE | Age: 52
End: 2022-10-17
Payer: COMMERCIAL

## 2022-10-17 VITALS
HEART RATE: 86 BPM | TEMPERATURE: 97.7 F | DIASTOLIC BLOOD PRESSURE: 101 MMHG | RESPIRATION RATE: 18 BRPM | OXYGEN SATURATION: 99 % | SYSTOLIC BLOOD PRESSURE: 165 MMHG

## 2022-10-17 DIAGNOSIS — R10.33 PERIUMBILICAL ABDOMINAL PAIN: Primary | ICD-10-CM

## 2022-10-17 DIAGNOSIS — K59.00 CONSTIPATION, UNSPECIFIED CONSTIPATION TYPE: ICD-10-CM

## 2022-10-17 PROCEDURE — 99214 OFFICE O/P EST MOD 30 MIN: CPT | Performed by: FAMILY MEDICINE

## 2022-10-17 RX ORDER — ESTRADIOL 1 MG/1
TABLET ORAL
COMMUNITY

## 2022-10-17 NOTE — PROGRESS NOTES
Abdominal Pain   The history is provided by the Patient. This is a new problem. The current episode started more than 1 week ago. The problem occurs constantly. The problem has been gradually worsening. The pain is associated with an unknown factor. The pain is at a severity of 5/10. The pain is moderate. Associated symptoms include anorexia and constipation (not moving bowel smoothly and hard stool). Pertinent negatives include no fever, no belching, no diarrhea, no flatus, no hematochezia, no melena, no nausea, no vomiting, no dysuria, no frequency, no hematuria and no back pain. The pain is worsened by palpation and certain positions (sitting and lying down). The pain is relieved by Nothing. Past workup comments: none. Her past medical history does not include gallstones, GERD, ulcerative colitis, irritable bowel syndrome, diverticulitis, kidney stones or small bowel obstruction.       Past Medical History:   Diagnosis Date    Asthma     SYMPTOM-FREE SINCE 2007    Chronic pain     lower back    Class 3 severe obesity due to excess calories with serious comorbidity and body mass index (BMI) of 40.0 to 44.9 in adult [facility])     Depression     TYPE A DEPRESSION    GERD (gastroesophageal reflux disease)     History of acute sinusitis 04/2022    History of bronchitis 04/2022    History of palpitations     Holter 11/21 - NSR, PVCs (occ), PACs (rare); Cards f/u 1/22 no tx req'd    Hypertension     Vitamin D deficiency     Vitiligo     VITILIGO HANDS,FACE, JOINTS        Past Surgical History:   Procedure Laterality Date    HX CHOLECYSTECTOMY      HX GYN  7/09    NOVASURE ABLATION    HX GYN      D & C    HX HYSTERECTOMY      2012    HX TONSILLECTOMY  AGE 7         Family History   Problem Relation Age of Onset    Diabetes Mother     Thyroid Disease Mother     Other Mother         MVP    Hypertension Mother     Heart Disease Mother     Cancer Father         MULTIPLE SKIN, SARCOMA    Hypertension Father     Heart Attack Subjective:       Patient ID: Nagi Galan is a 34 y.o. male.    Chief Complaint: No chief complaint on file.    HPI  Review of Systems      Objective:      Physical Exam    Assessment:       Problem List Items Addressed This Visit    None  Visit Diagnoses       Encounter for vision screening    -  Primary            Plan:       Vision exam only           Maternal Grandfather     Cancer Paternal Grandfather          OF LUNG CA    Ulcerative Colitis Son         Social History     Socioeconomic History    Marital status:      Spouse name: Not on file    Number of children: Not on file    Years of education: Not on file    Highest education level: Not on file   Occupational History    Not on file   Tobacco Use    Smoking status: Never    Smokeless tobacco: Never   Vaping Use    Vaping Use: Never used   Substance and Sexual Activity    Alcohol use: Yes     Alcohol/week: 3.0 standard drinks     Types: 3 Shots of liquor per week     Comment: on     Drug use: No    Sexual activity: Yes     Partners: Male     Birth control/protection: Surgical   Other Topics Concern    Not on file   Social History Narrative    Not on file     Social Determinants of Health     Financial Resource Strain: Not on file   Food Insecurity: Not on file   Transportation Needs: Not on file   Physical Activity: Not on file   Stress: Not on file   Social Connections: Not on file   Intimate Partner Violence: Not on file   Housing Stability: Not on file                ALLERGIES: Sulfa (sulfonamide antibiotics) and Sulfa dyne    Review of Systems   Constitutional:  Positive for appetite change. Negative for fever. Gastrointestinal:  Positive for abdominal pain, anorexia and constipation (not moving bowel smoothly and hard stool). Negative for diarrhea, flatus, hematochezia, melena, nausea and vomiting. Genitourinary:  Negative for dysuria, frequency and hematuria. Musculoskeletal:  Negative for back pain. All other systems reviewed and are negative. Vitals:    10/17/22 1022 10/17/22 1023   BP: (!) 176/111 (!) 165/101   Pulse: 86    Resp: 18    Temp: 97.7 °F (36.5 °C)    SpO2: 99%        Physical Exam  Vitals and nursing note reviewed. Constitutional:       General: She is not in acute distress.   HENT:      Right Ear: Tympanic membrane and ear canal normal.      Left Ear: Tympanic membrane and ear canal normal.      Nose: Nose normal.      Mouth/Throat:      Pharynx: No oropharyngeal exudate or posterior oropharyngeal erythema. Eyes:      General:         Right eye: No discharge. Left eye: No discharge. Conjunctiva/sclera: Conjunctivae normal.   Pulmonary:      Effort: Pulmonary effort is normal. No respiratory distress. Breath sounds: Normal breath sounds. No wheezing, rhonchi or rales. Abdominal:      Tenderness: Tenderness: mild - diffuse- most periumbilical  and epigastric area. Comments: Diffuse fullness on deep palpation    Musculoskeletal:      Cervical back: Neck supple. Lymphadenopathy:      Cervical: No cervical adenopathy. Skin:     Findings: No rash. MDM    Procedures        ICD-10-CM ICD-9-CM    1. Periumbilical abdominal pain  R10.33 789.05 XR ABD (KUB)      AMB POC URINALYSIS DIP STICK AUTO W/O MICRO      2. Constipation, unspecified constipation type  K59.00 564.00           Use Dulcolax suppository- followed by fleets enema if no improvement  x 1-2 nights    Start Miralax powder 1 scoop daily @ bed time     Take 1 dose of Magnesium Citrate 1 bottle-   Followed by   Senakot 2 tb Bid up to 5 days if no improvement         No orders of the defined types were placed in this encounter. Results for orders placed or performed in visit on 10/17/22   XR ABD (KUB)    Narrative    Clinical indication: Abdominal pain. KUB obtained, moderate fecal stasis, prior cholecystectomy. Phleboliths in the  pelvis. Next      Impression    Moderate constipation. The patients condition was discussed with the patient and they understand. The patient is to follow up with primary care doctor. If signs and symptoms become worse the pt is to go to the ER. The patient is to take medications as prescribed.

## 2022-10-17 NOTE — PATIENT INSTRUCTIONS
Use Dulcolax suppository- followed by fleets enema if no improvement  x 1-2 nights    Start Miralax powder 1 scoop daily @ bed time     Take 1 dose of Magnesium Citrate 1 bottle-   Followed by   Senakot 2 tb Bid up to 5 days if no improvement

## 2022-10-29 RX ORDER — METAXALONE 800 MG/1
800 TABLET ORAL 2 TIMES DAILY
Qty: 60 TABLET | Refills: 0 | Status: SHIPPED | OUTPATIENT
Start: 2022-10-29 | End: 2022-11-28

## 2022-11-28 RX ORDER — METAXALONE 800 MG/1
TABLET ORAL
Qty: 60 TABLET | Refills: 0 | Status: SHIPPED | OUTPATIENT
Start: 2022-11-28

## 2022-12-09 RX ORDER — FAMOTIDINE 40 MG/1
40 TABLET, FILM COATED ORAL DAILY
Qty: 90 TABLET | Refills: 3 | Status: SHIPPED | OUTPATIENT
Start: 2022-12-09

## 2022-12-09 NOTE — TELEPHONE ENCOUNTER
PCP: Andrew Wan MD    Last appt: 8/30/2022  Future Appointments   Date Time Provider Mariangel Pereira   3/1/2023  9:15 AM Andrew Wan MD [facility] AMB       Requested Prescriptions     Pending Prescriptions Disp Refills    famotidine (PEPCID) 40 mg tablet 90 Tablet 3     Sig: Take 1 Tablet by mouth daily.        Prior labs and Blood pressures:  BP Readings from Last 3 Encounters:   10/17/22 (!) 165/101   08/30/22 125/82   05/26/22 135/84     Lab Results   Component Value Date/Time    Sodium 138 09/13/2022 08:25 AM    Potassium 4.2 09/13/2022 08:25 AM    Chloride 105 09/13/2022 08:25 AM    CO2 26 09/13/2022 08:25 AM    Anion gap 7 09/13/2022 08:25 AM    Glucose 79 09/13/2022 08:25 AM    BUN 13 09/13/2022 08:25 AM    Creatinine 1.00 09/13/2022 08:25 AM    BUN/Creatinine ratio 13 09/13/2022 08:25 AM    GFR est AA >60 09/13/2022 08:25 AM    GFR est non-AA 58 (L) 09/13/2022 08:25 AM    Calcium 9.4 09/13/2022 08:25 AM     Lab Results   Component Value Date/Time    Hemoglobin A1c 5.3 03/02/2021 10:43 AM     Lab Results   Component Value Date/Time    Cholesterol, total 225 (H) 08/30/2022 11:57 AM    HDL Cholesterol 63 08/30/2022 11:57 AM    LDL, calculated 134.8 (H) 08/30/2022 11:57 AM    VLDL, calculated 27.2 08/30/2022 11:57 AM    Triglyceride 136 08/30/2022 11:57 AM    CHOL/HDL Ratio 3.6 08/30/2022 11:57 AM     Lab Results   Component Value Date/Time    VITAMIN D, 25-HYDROXY 57 03/02/2021 10:42 AM       Lab Results   Component Value Date/Time    TSH 1.39 11/09/2021 10:23 AM    TSH, 3rd generation 1.51 03/02/2021 10:42 AM

## 2022-12-19 DIAGNOSIS — G89.4 CHRONIC PAIN SYNDROME: ICD-10-CM

## 2022-12-19 RX ORDER — PREGABALIN 100 MG/1
CAPSULE ORAL
Qty: 60 CAPSULE | Refills: 0 | Status: SHIPPED | OUTPATIENT
Start: 2022-12-19

## 2023-01-06 DIAGNOSIS — J45.20 MILD INTERMITTENT ASTHMA WITHOUT COMPLICATION: ICD-10-CM

## 2023-01-06 RX ORDER — ALBUTEROL SULFATE 90 UG/1
AEROSOL, METERED RESPIRATORY (INHALATION)
Qty: 6.7 EACH | Refills: 5 | Status: SHIPPED | OUTPATIENT
Start: 2023-01-06

## 2023-01-21 DIAGNOSIS — G89.4 CHRONIC PAIN SYNDROME: ICD-10-CM

## 2023-01-23 RX ORDER — PREGABALIN 100 MG/1
CAPSULE ORAL
Qty: 60 CAPSULE | Refills: 0 | Status: SHIPPED | OUTPATIENT
Start: 2023-01-23

## 2023-02-26 DIAGNOSIS — G89.4 CHRONIC PAIN SYNDROME: ICD-10-CM

## 2023-02-26 DIAGNOSIS — I10 ESSENTIAL HYPERTENSION: ICD-10-CM

## 2023-02-26 DIAGNOSIS — K21.9 GASTROESOPHAGEAL REFLUX DISEASE WITHOUT ESOPHAGITIS: ICD-10-CM

## 2023-02-27 RX ORDER — FAMOTIDINE 40 MG/1
40 TABLET, FILM COATED ORAL DAILY
Qty: 90 TABLET | Refills: 3 | Status: SHIPPED | OUTPATIENT
Start: 2023-02-27

## 2023-02-27 RX ORDER — BUPROPION HYDROCHLORIDE 300 MG/1
300 TABLET ORAL DAILY
Qty: 90 TABLET | Refills: 3 | Status: SHIPPED | OUTPATIENT
Start: 2023-02-27

## 2023-02-27 RX ORDER — PREGABALIN 100 MG/1
100 CAPSULE ORAL 2 TIMES DAILY
Qty: 60 CAPSULE | Refills: 5 | Status: SHIPPED | OUTPATIENT
Start: 2023-02-27

## 2023-02-27 RX ORDER — OMEPRAZOLE 40 MG/1
40 CAPSULE, DELAYED RELEASE ORAL DAILY
Qty: 30 CAPSULE | Refills: 11 | Status: SHIPPED | OUTPATIENT
Start: 2023-02-27 | End: 2024-02-22

## 2023-02-27 RX ORDER — SPIRONOLACTONE 50 MG/1
50 TABLET, FILM COATED ORAL DAILY
Qty: 90 TABLET | Refills: 3 | Status: SHIPPED | OUTPATIENT
Start: 2023-02-27

## 2023-02-27 RX ORDER — CHOLECALCIFEROL TAB 125 MCG (5000 UNIT) 125 MCG
5000 TAB ORAL DAILY
Qty: 30 TABLET | Refills: 11 | Status: SHIPPED | OUTPATIENT
Start: 2023-02-27 | End: 2024-02-22

## 2023-02-27 RX ORDER — METAXALONE 800 MG/1
800 TABLET ORAL 2 TIMES DAILY
Qty: 60 TABLET | Refills: 5 | Status: SHIPPED | OUTPATIENT
Start: 2023-02-27

## 2023-02-27 RX ORDER — LISINOPRIL 2.5 MG/1
2.5 TABLET ORAL DAILY
Qty: 90 TABLET | Refills: 3 | Status: SHIPPED | OUTPATIENT
Start: 2023-02-27

## 2023-02-27 NOTE — PROGRESS NOTES
ICD-10-CM ICD-9-CM    1. Routine adult health maintenance  Z00.00 V70.0       2. Essential hypertension  I10 401.9       3. Class 3 severe obesity due to excess calories with serious comorbidity and body mass index (BMI) of 40.0 to 44.9 in adult ([facility])  E66.01 278.01     Z68.41 V85.41       4. Elevated alkaline phosphatase level  K05.5 343.5 METABOLIC PANEL, COMPREHENSIVE      5. Primary osteoarthritis involving multiple joints  M15.9 715.98 XR HAND LT AP/LAT      XR HAND RT AP/LAT      XR KNEES BI STAND      6. CKD stage G3a/A1, GFR 45-59 and albumin creatinine ratio <30 mg/g ([facility])  N18.31 585.3 MICROALBUMIN, UR, RAND W/ MICROALB/CREAT RATIO               Subjective:     Chief Complaint   Patient presents with    Follow-up       Henry Henderson is a 48 y.o. F.  she  has a past medical history of Asthma, Chronic pain, CKD stage G3a/A1, GFR 45-59 and albumin creatinine ratio <30 mg/g ([facility]), Class 3 severe obesity due to excess calories with serious comorbidity and body mass index (BMI) of 40.0 to 44.9 in adult [facility]), Depression, GERD (gastroesophageal reflux disease), History of acute sinusitis (04/2022), History of bronchitis (04/2022), History of palpitations, Hypercholesterolemia (08/2022), Hypertension, Primary osteoarthritis involving multiple joints, Vitamin D deficiency, and Vitiligo.     She has no past medical history of Atrial fibrillation ([address] CAD (coronary artery disease), Cancer ([address] Carotid artery disease ([address] Chronic kidney disease, Chronic obstructive pulmonary disease ([address] Clotting disorder ([address] Congestive heart failure ([address] Diabetes ([address] Glaucoma, Hepatitis, HIV (human immunodeficiency virus infection) ([address] Hyperlipidemia, ICD (implantable cardioverter-defibrillator) in place, Long term current use of anticoagulant therapy, Murmur, Myocardial infarction ([address] Pacemaker, Pulmonary embolism ([address] Rheumatic fever, Seizures ([address] Stroke ([address] Syncope, Thyroid disease, or Valvular heart disease. her last appointment in this clinic was 8/30/2022 . I reviewed and updated the medical record. At this patient's most recent appointment with me in late August 2022, the patient had reported feeling generally well and did not have any acute somatic complaints. Her insurance company was not [employer], and she had not gone to nutrition as we had previously discussed. Physical examination at the time was notable for severe obesity with a BMI of 44 kg/m². Routine laboratory studies were ordered and she was referred for routine screening mammography. She was once again advised to follow-up with nutritional medicine to help her with her weight loss. More recently, the patient was seen in urgent care with a complaint of epigastric abdominal discomfort. This was felt by the patient to be 5/10 in severity. Physical examination had been notable for mild, diffuse periumbilical and epigastric tenderness to palpation and abdominal discomfort. KUB was ordered and this was unremarkable. She was felt to be likely constipated and was treated symptomatically. Today, the patient comes in for routine follow-up. She reports feeling great overall today and her only complaint relates to some joint pain in her hands and knees that have been going on for the past several years. HTN: She continues on a combination of lisinopril as well as spironolactone for this. She reports no lightheadedness or dizziness. No recent muscle cramping or other problems. She has no personal history of cardiovascular or cerebrovascular disease. She does not check her blood pressure readings regularly at home but otherwise has felt well in this regard. No recent chest pain, shortness breath, or any further cardiopulmonary concerns. OBESITY: She is on naltrexone at the direction of a different provider. With this medication, she has lost about 5 pounds since her last visit.   She continues to try to exercise and monitor her diet as much as possible, and has made significant changes since her last visit overall. She feels that she is doing overall well with the medication. OA: She complains of aches and pains in the bilateral hands and knees. She denies having had any significant joint swelling during this time, and says that while she does have morning stiffness, and occasional gelling phenomena, these are typically transient, and last for only a few minutes at a time. No recent fevers or chills, changes in energy level, or other constitutional systemic complaints. She says that she was evaluated for similar symptoms in her feet by her orthopedic surgeon, and was advised on conservative measures mainly. Review of systems is otherwise negative. Routine Healthcare Maintenance issues are reviewed and discussed with the patient as noted below. Orders to update gaps in healthcare maintenance were placed as noted below in the Assessment and Plan, where applicable. 10-year Cardiovascular Risk Velannette Sinha, 2013):   The 10-year ASCVD risk score (Wayne MILLAN, et al., 2019) is: 1.9%    Values used to calculate the score:      Age: 48 years      Sex: Female      Is Non- : No      Diabetic: No      Tobacco smoker: No      Systolic Blood Pressure: 440 mmHg      Is BP treated: Yes      HDL Cholesterol: 63 MG/DL      Total Cholesterol: 225 MG/DL     Past Medical History:  Past Medical History:   Diagnosis Date    Asthma     SYMPTOM-FREE SINCE 2007    Chronic pain     lower back    CKD stage G3a/A1, GFR 45-59 and albumin creatinine ratio <30 mg/g ([facility])     Class 3 severe obesity due to excess calories with serious comorbidity and body mass index (BMI) of 40.0 to 44.9 in adult [facility])     Depression     TYPE A DEPRESSION    GERD (gastroesophageal reflux disease)     History of acute sinusitis 04/2022    History of bronchitis 04/2022    History of palpitations     Holter 11/21 - NSR, PVCs (occ), PACs (rare); Cards f/u 1/22 no tx req'd    Hypercholesterolemia 08/2022    Hypertension     Primary osteoarthritis involving multiple joints     Vitamin D deficiency     Vitiligo     VITILIGO HANDS,FACE, JOINTS       Past Surgical Histor:  Past Surgical History:   Procedure Laterality Date    HX CHOLECYSTECTOMY      HX GYN  7/09    NOVASURE ABLATION    HX GYN      D & C    HX HYSTERECTOMY      2012    HX TONSILLECTOMY  AGE 7       Allergies: Allergies   Allergen Reactions    Sulfa (Sulfonamide Antibiotics) Hives    Sulfa Dyne Hives and Itching       Medications:  Current Outpatient Medications   Medication Sig Dispense Refill    naltrexone (DEPADE) 50 mg tablet naltrexone 50 mg tablet   1/4 tablet po BID for carb craving, can increase to 1/2 tablet BID      spironolactone (ALDACTONE) 50 mg tablet Take 1 Tablet by mouth daily. 90 Tablet 3    buPROPion XL (WELLBUTRIN XL) 300 mg XL tablet Take 1 Tablet by mouth daily. 90 Tablet 3    cholecalciferol (VITAMIN D3) (5000 Units/125 mcg) tab tablet Take 1 Tablet by mouth daily for 360 days. 30 Tablet 11    lisinopriL (PRINIVIL, ZESTRIL) 2.5 mg tablet Take 1 Tablet by mouth daily. 90 Tablet 3    omeprazole (PRILOSEC) 40 mg capsule Take 1 Capsule by mouth daily for 360 days. 30 Capsule 11    metaxalone (SKELAXIN) 800 mg tablet Take 1 Tablet by mouth two (2) times a day. 60 Tablet 5    famotidine (PEPCID) 40 mg tablet Take 1 Tablet by mouth daily. 90 Tablet 3    pregabalin (LYRICA) 100 mg capsule Take 1 Capsule by mouth two (2) times a day. Max Daily Amount: 200 mg. 60 Capsule 5    albuterol (PROVENTIL HFA, VENTOLIN HFA, PROAIR HFA) 90 mcg/actuation inhaler INHALE 2 PUFFS EVERY 6 HOURS AS NEEDED FOR WHEEZE 6.7 Each 5    estradioL (ESTRACE) 1 mg tablet estradiol 1 mg tablet   Take 1 tablet every day by oral route. levocetirizine (XYZAL) 5 mg tablet Take  by mouth. YR-EP-LL-Fe-Min-Lycopen-Lutein 0.4-162-18 mg tab Take  by mouth daily.       augmented betamethasone dipropionate (DIPROLENE-AF) 0.05 % topical cream Apply  to affected area two (2) times a day. (Patient not taking: No sig reported) 15 g 0       Social History:  Social History     Socioeconomic History    Marital status:    Tobacco Use    Smoking status: Never    Smokeless tobacco: Never   Vaping Use    Vaping Use: Never used   Substance and Sexual Activity    Alcohol use: Yes     Alcohol/week: 3.0 standard drinks     Types: 3 Shots of liquor per week     Comment: on     Drug use: No    Sexual activity: Yes     Partners: Male     Birth control/protection: Surgical       Family History:  Family History   Problem Relation Age of Onset    Diabetes Mother     Thyroid Disease Mother     Other Mother         MVP    Hypertension Mother     Heart Disease Mother     Cancer Father         MULTIPLE SKIN, SARCOMA    Hypertension Father     Heart Attack Maternal Grandfather     Cancer Paternal Grandfather          OF LUNG CA    Ulcerative Colitis Son        Immunizations:  Immunization History   Administered Date(s) Administered    COVID-19, MODERNA Booster BLUE border, (age 18y+), IM, 50mcg/0.25mL 2021, 2021, 10/28/2021    Influenza Vaccine 10/28/2021, 2022    Pneumococcal Conjugate PCV20, PF (Prevnar 20) 2022    Tdap 2016    Zoster Recombinant 2022        Healthcare Maintenance:  Health Maintenance   Topic Date Due    COVID-19 Vaccine (1) 1970    Shingles Vaccine (2 of 2) 2022    Depression Monitoring  2023    Colorectal Cancer Screening Combo  2023    Breast Cancer Screen Mammogram  2023    DTaP/Tdap/Td series (2 - Td or Tdap) 2026    Lipid Screen  2027    Hepatitis C Screening  Completed    Flu Vaccine  Completed    Pneumococcal 0-64 years  Completed        Review of Systems:  ROS:  Review of Systems   Constitutional: Negative. HENT: Negative. Eyes: Negative. Respiratory: Negative. Cardiovascular: Negative. Gastrointestinal: Negative. Genitourinary: Negative. Musculoskeletal:  Positive for joint pain. Skin: Negative. Neurological: Negative. Endo/Heme/Allergies: Negative. Psychiatric/Behavioral: Negative. ROS otherwise negative      Objective:     Vital Signs:  Visit Vitals  /80 (BP 1 Location: Left upper arm, BP Patient Position: Sitting, BP Cuff Size: Large adult)   Pulse 75   Resp 18   Ht 5' (1.524 m)   Wt 221 lb 1.6 oz (100.3 kg)   LMP 11/01/2012   SpO2 99%   BMI 43.18 kg/m²       BMI:  Body mass index is 43.18 kg/m². Physical Examination:  Physical Exam  Constitutional:       Appearance: Normal appearance. She is obese. HENT:      Head: Normocephalic and atraumatic. Right Ear: External ear normal.      Left Ear: External ear normal.      Nose: Nose normal.      Mouth/Throat:      Mouth: Mucous membranes are moist.      Pharynx: Oropharynx is clear. No oropharyngeal exudate or posterior oropharyngeal erythema. Cardiovascular:      Rate and Rhythm: Normal rate and regular rhythm. Pulses: Normal pulses. Heart sounds: Normal heart sounds. No murmur heard. No friction rub. No gallop. Pulmonary:      Effort: Pulmonary effort is normal. No respiratory distress. Breath sounds: Normal breath sounds. No wheezing, rhonchi or rales. Abdominal:      General: Abdomen is flat. Bowel sounds are normal. There is no distension. Palpations: Abdomen is soft. Tenderness: There is no abdominal tenderness. There is no guarding. Musculoskeletal:         General: No swelling, tenderness or deformity. Normal range of motion. Cervical back: Normal range of motion and neck supple. No rigidity or tenderness. Skin:     General: Skin is warm and dry. Findings: No erythema, lesion or rash. Neurological:      General: No focal deficit present. Mental Status: She is alert and oriented to person, place, and time. Mental status is at baseline.       Sensory: No sensory deficit. Motor: No weakness. Gait: Gait normal.      Deep Tendon Reflexes: Reflexes normal.   Psychiatric:         Mood and Affect: Mood normal.         Behavior: Behavior normal.         Judgment: Judgment normal.        Physical exam otherwise negative    Diagnostic Testing:    Laboratory Studies:  Lab Only on 09/13/2022   Component Date Value Ref Range Status    Sodium 09/13/2022 138  136 - 145 mmol/L Final    Potassium 09/13/2022 4.2  3.5 - 5.1 mmol/L Final    Chloride 09/13/2022 105  97 - 108 mmol/L Final    CO2 09/13/2022 26  21 - 32 mmol/L Final    Anion gap 09/13/2022 7  5 - 15 mmol/L Final    Glucose 09/13/2022 79  65 - 100 mg/dL Final    BUN 09/13/2022 13  6 - 20 MG/DL Final    Creatinine 09/13/2022 1.00  0.55 - 1.02 MG/DL Final    BUN/Creatinine ratio 09/13/2022 13  12 - 20   Final    GFR est AA 09/13/2022 >60  >60 ml/min/1.73m2 Final    GFR est non-AA 09/13/2022 58 (A)  >60 ml/min/1.73m2 Final    Estimated GFR is calculated using the IDMS-traceable Modification of Diet in Renal Disease (MDRD) Study equation, reported for both  Americans (GFRAA) and non- Americans (GFRNA), and normalized to 1.73m2 body surface area. The physician must decide which value applies to the patient. Calcium 09/13/2022 9.4  8.5 - 10.1 MG/DL Final    Bilirubin, total 09/13/2022 0.4  0.2 - 1.0 MG/DL Final    ALT (SGPT) 09/13/2022 29  12 - 78 U/L Final    AST (SGOT) 09/13/2022 16  15 - 37 U/L Final    Alk. phosphatase 09/13/2022 156 (A)  45 - 117 U/L Final    Protein, total 09/13/2022 7.1  6.4 - 8.2 g/dL Final    Albumin 09/13/2022 4.2  3.5 - 5.0 g/dL Final    Globulin 09/13/2022 2.9  2.0 - 4.0 g/dL Final    A-G Ratio 09/13/2022 1.4  1.1 - 2.2   Final         Radiographic Studies:  XR Results (most recent):  Results from Appointment encounter on 10/17/22    XR ABD (KUB)    Narrative  Clinical indication: Abdominal pain. KUB obtained, moderate fecal stasis, prior cholecystectomy.  Phleboliths in the  pelvis. Next    Impression  Moderate constipation. [facility] Results (most recent):  Results from Abstract encounter on 12/23/22    [facility] 3D STEFAN W MAMMO BI SCREENING INCL CAD     CT Results (most recent):  Results from Hospital Encounter encounter on 04/27/21    CT ABD PELV W CONT    Narrative  EXAM: CT ABD PELV W CONT    INDICATION: Diarrhea x9 days    COMPARISON: None    CONTRAST: 100 mL of Isovue-370. TECHNIQUE:  Following the uneventful intravenous administration of contrast, thin axial  images were obtained through the abdomen and pelvis. Coronal and sagittal  reconstructions were generated. Oral contrast was not administered. CT dose  reduction was achieved through use of a standardized protocol tailored for this  examination and automatic exposure control for dose modulation. FINDINGS:  LOWER THORAX: No significant abnormality in the incidentally imaged lower chest.  LIVER: No mass. BILIARY TREE: Prior cholecystectomy. CBD is not dilated. SPLEEN: within normal limits. PANCREAS: No mass or ductal dilatation. ADRENALS: Unremarkable. KIDNEYS: No mass, calculus, or hydronephrosis. STOMACH: Mild thickening of the distal stomach. SMALL BOWEL: No dilatation or wall thickening. COLON: No dilatation or wall thickening. APPENDIX: Normal on coronal image 42  PERITONEUM: No ascites or pneumoperitoneum. RETROPERITONEUM: No lymphadenopathy or aortic aneurysm. REPRODUCTIVE ORGANS: Prior hysterectomy  URINARY BLADDER: No mass or calculus. BONES: No destructive bone lesion. Erosive changes at the L5-S1 levels  bilaterally. 5.6 mm anterolisthesis of L5 relative to the sacrum. ABDOMINAL WALL: No mass or hernia. ADDITIONAL COMMENTS: N/A    Impression  Mild gastritis  No focal bowel process identified  Grade 1 anterolisthesis of L4 on a degenerative basis     DEXA Results (most recent):  No results found for this or any previous visit.      MRI Results (most recent):  Results from [facility] encounter on 12/18/20    MRI LUMB SPINE WO CONT    Narrative  EXAM: MRI LUMB SPINE WO CONT    INDICATION: Low back pain, hysterectomy. COMPARISON: None    TECHNIQUE: MR imaging of the lumbar spine was performed using the following  sequences: sagittal T1, T2, STIR;  axial T1, T2.    CONTRAST:  None. FINDINGS:    5 mm grade 1 anterolisthesis of L5-S1. Facet arthrosis, no spondylolysis. Vertebral body heights are maintained. Marrow signal is normal. Mild disc  desiccation at L5-S1. The conus medullaris terminates at L1-L2. Signal and caliber of the distal  spinal cord are within normal limits. The paraspinal soft tissues are within normal limits. Lower thoracic spine: No herniation or stenosis. L1-L2: Diffuse disc bulge. No stenosis. L2-L3: No herniation or stenosis. L3-L4: Diffuse disc bulge, facet arthrosis, and thickened ligamentum flavum. Mild central spinal canal stenosis. L4-L5: Diffuse disc bulge, facet arthrosis, and thickened ligamentum flavum. No  stenosis. L5-S1: Uncovered disc. Facet arthrosis. No stenosis. Impression  IMPRESSION:    1. L3-L4 mild central spinal canal stenosis. 2. L5-S1 grade 1 anterolisthesis due to facet arthrosis. Assessment/Plan:       ICD-10-CM ICD-9-CM    1. Routine adult health maintenance  Z00.00 V70.0       2. Essential hypertension  I10 401.9       3. Class 3 severe obesity due to excess calories with serious comorbidity and body mass index (BMI) of 40.0 to 44.9 in adult ([facility])  E66.01 278.01     Z68.41 V85.41       4. Elevated alkaline phosphatase level  V76.7 302.3 METABOLIC PANEL, COMPREHENSIVE      5. Primary osteoarthritis involving multiple joints  M15.9 715.98 XR HAND LT AP/LAT      XR HAND RT AP/LAT      XR KNEES BI STAND      6.  CKD stage G3a/A1, GFR 45-59 and albumin creatinine ratio <30 mg/g ([facility])  N18.31 585.3 MICROALBUMIN, UR, RAND W/ MICROALB/CREAT RATIO             Osteoarthritis of multiple sites:  - Suspected based upon history; doubt rheumatoid arthritis  - Conservative measures advised, note ongoing use of spironolactone and lisinopril, avoiding systemic NSAIDs  - History of sulfa allergy, unable to use topical diclofenac  - Consider Cymbalta  - X-rays ordered for evaluation  - If periarticular erosions are noted, then consider evaluation for rheumatoid arthritis, given history of vitiligo and other autoimmune disease, holding off for now    Follow-up and Dispositions    Return in about 6 months (around 9/1/2023). Healthcare Maintenance:  - Preventive measures are reviewed as per above  - Up to date on routine interventions except as noted above  - Orders placed to update gaps as noted  - Notes: Repeat laboratory studies ordered      Hyperlipidemia/Dyslipidemia:   - Summary of Cardiovascular Risks and Goals:     LDL goal is under 100  hypertension  hyperlipidemia  obese  Morris 10-year risk <10%   -   Lab Results   Component Value Date/Time    LDL, calculated 134.8 (H) 08/30/2022 11:57 AM    HDL Cholesterol 63 08/30/2022 11:57 AM       - Relevant Cholesterol Meds:  Key Antihyperlipidemia Meds       The patient is on no antihyperlipidemia meds. - Cholesterol at target: no   - Does patient meet USPSTF and ACC/AHA indications for pharmacotherapy (e.g., statin): no   - GI symptoms with meds: N/A   - Muscle aches with meds: N/A   - Other Adverse effects with meds: N/A   - Medication Plan:  defer   - Notes: CCM, continue TLC      Essential Hypertension/Blood Pressure Management:   - Home BP Readings: not doing   - Current Control: optimal   - Target BP: <130/80 mmHg   - Relevant BP Meds:  Key CAD CHF Meds               spironolactone (ALDACTONE) 50 mg tablet (Taking) Take 1 Tablet by mouth daily. lisinopriL (PRINIVIL, ZESTRIL) 2.5 mg tablet (Taking) Take 1 Tablet by mouth daily. spironolactone (ALDACTONE) 50 mg tablet (Discontinued) Take 1 Tablet by mouth daily.     lisinopriL (PRINIVIL, ZESTRIL) 2.5 mg tablet (Discontinued) Take 1 Tablet by mouth daily.           - Plan: continue current meds, dietary sodium restriction, regular aerobic exercise, weight loss   - Notes: repeat labs pending      Chronic Kidney Disease:   - Last Serum Creatinine:   Lab Results   Component Value Date/Time    Creatinine (POC) 1.1 01/15/2015 01:00 PM    Creatinine 1.00 09/13/2022 08:25 AM       - Last GFR:   Lab Results   Component Value Date/Time    GFR est AA >60 09/13/2022 08:25 AM    GFR est non-AA 58 (L) 09/13/2022 08:25 AM      - Current Stage by eGFR: G3A   - Proteinuria: A2   -   Key CKD Meds               spironolactone (ALDACTONE) 50 mg tablet (Taking) Take 1 Tablet by mouth daily. cholecalciferol (VITAMIN D3) (5000 Units/125 mcg) tab tablet (Taking) Take 1 Tablet by mouth daily for 360 days. lisinopriL (PRINIVIL, ZESTRIL) 2.5 mg tablet (Taking) Take 1 Tablet by mouth daily. spironolactone (ALDACTONE) 50 mg tablet (Discontinued) Take 1 Tablet by mouth daily. cholecalciferol (VITAMIN D3) (5000 Units/125 mcg) tab tablet (Discontinued) Take 1 Tablet by mouth daily for 360 days. lisinopriL (PRINIVIL, ZESTRIL) 2.5 mg tablet (Discontinued) Take 1 Tablet by mouth daily.           - Blood Pressure Target: See Hypertension/Blood Pressure Management Section   - Advised avoidance of NSAIDs and other nephrotoxins wherever possible   - Advised renal dosing of medications where necessary   - Acid/Base Management: ---   - Phosphorus Management: ---   - Nephrology Consultation: defer   - Notes: repeat labs pending            I have reviewed the patient's medical history in detail and updated the computerized patient record. We had a prolonged discussion about these complex clinical issues and went over the various important aspects to consider. All questions were answered.       Advised the patient to call back or return to office if symptoms do not improve, change in nature, or persist.     The patient was given an after visit summary or informed of [employer] which includes patient instructions, diagnoses, current medications, & vitals. she expressed understanding with the diagnosis and plan. Doug Vanessa MD    Please note that this dictation was completed with [employer], the computer voice recognition software. Quite often unanticipated grammatical, syntax, homophones, and other interpretive errors are inadvertently transcribed by the computer software. Please disregard these errors. Please excuse any errors that have escaped final proofreading.

## 2023-02-27 NOTE — TELEPHONE ENCOUNTER
Requested Prescriptions     Pending Prescriptions Disp Refills    spironolactone (ALDACTONE) 50 mg tablet 90 Tablet 3     Sig: Take 1 Tablet by mouth daily. buPROPion XL (WELLBUTRIN XL) 300 mg XL tablet 90 Tablet 3     Sig: Take 1 Tablet by mouth daily. cholecalciferol (VITAMIN D3) (5000 Units/125 mcg) tab tablet 30 Tablet 11     Sig: Take 1 Tablet by mouth daily for 360 days. lisinopriL (PRINIVIL, ZESTRIL) 2.5 mg tablet 90 Tablet 3     Sig: Take 1 Tablet by mouth daily. omeprazole (PRILOSEC) 40 mg capsule 30 Capsule 11     Sig: Take 1 Capsule by mouth daily for 360 days. metaxalone (SKELAXIN) 800 mg tablet 60 Tablet 5     Sig: Take 1 Tablet by mouth two (2) times a day. famotidine (PEPCID) 40 mg tablet 90 Tablet 3     Sig: Take 1 Tablet by mouth daily. pregabalin (LYRICA) 100 mg capsule 60 Capsule 5     Sig: Take 1 Capsule by mouth two (2) times a day. Max Daily Amount: 200 mg. RX refill request from the patient/pharmacy. Patient last seen 8/30/22 with labs, and next appt. scheduled for 3/1/23.

## 2023-03-01 ENCOUNTER — OFFICE VISIT (OUTPATIENT)
Dept: INTERNAL MEDICINE CLINIC | Age: 53
End: 2023-03-01

## 2023-03-01 VITALS
SYSTOLIC BLOOD PRESSURE: 120 MMHG | HEIGHT: 60 IN | WEIGHT: 221.1 LBS | HEART RATE: 75 BPM | OXYGEN SATURATION: 99 % | BODY MASS INDEX: 43.41 KG/M2 | DIASTOLIC BLOOD PRESSURE: 80 MMHG | RESPIRATION RATE: 18 BRPM

## 2023-03-01 DIAGNOSIS — M15.9 PRIMARY OSTEOARTHRITIS INVOLVING MULTIPLE JOINTS: ICD-10-CM

## 2023-03-01 DIAGNOSIS — I10 ESSENTIAL HYPERTENSION: ICD-10-CM

## 2023-03-01 DIAGNOSIS — N18.31 CKD STAGE G3A/A1, GFR 45-59 AND ALBUMIN CREATININE RATIO <30 MG/G (HCC): ICD-10-CM

## 2023-03-01 DIAGNOSIS — Z00.00 ROUTINE ADULT HEALTH MAINTENANCE: Primary | ICD-10-CM

## 2023-03-01 DIAGNOSIS — R74.8 ELEVATED ALKALINE PHOSPHATASE LEVEL: ICD-10-CM

## 2023-03-01 DIAGNOSIS — E66.01 CLASS 3 SEVERE OBESITY DUE TO EXCESS CALORIES WITH SERIOUS COMORBIDITY AND BODY MASS INDEX (BMI) OF 40.0 TO 44.9 IN ADULT (HCC): ICD-10-CM

## 2023-03-01 RX ORDER — NALTREXONE HYDROCHLORIDE 50 MG/1
TABLET, FILM COATED ORAL
COMMUNITY

## 2023-03-01 NOTE — PATIENT INSTRUCTIONS
Chronic Kidney Disease: Care Instructions  Overview     Chronic kidney disease happens when your kidneys don't work as well as they should. Your kidneys have a few important jobs. They remove waste from your blood. This waste leaves your body in your urine. They also balance your body's fluids and chemicals. When your kidneys don't work well, extra waste and fluid can build up. This can poison the body and sometimes cause death. The most common causes of this disease are diabetes and high blood pressure. In some cases, the disease develops in 2 to 3 months. But it usually develops over many years. If you take medicine and make healthy changes to your lifestyle, you may be able to prevent the disease from getting worse. But if your kidney damage gets worse, you may need dialysis or a kidney transplant. Dialysis uses a machine to filter waste from the blood. A transplant is surgery to give you a healthy kidney from another person. Follow-up care is a key part of your treatment and safety. Be sure to make and go to all appointments, and call your doctor if you are having problems. It's also a good idea to know your test results and keep a list of the medicines you take. How can you care for yourself at home? Treatments and appointments    Be safe with medicines. Take your medicines exactly as prescribed. Call your doctor if you have any problems with your medicine. You also may take medicine to control your blood pressure or to treat diabetes. Many people who have diabetes take blood pressure medicine. If you have diabetes, do your best to keep your blood sugar in your target range. You may do this by eating healthy food and exercising. You may also take medicines. Go to your dialysis appointments if you have this treatment. Do not take ibuprofen, naproxen, or similar medicines, unless your doctor tells you to. These may make the disease worse.      Do not take any vitamins, over-the-counter medicines, or herbal products without talking to your doctor first.     Jimmy Alvarez not smoke or use other tobacco products. Smoking can reduce blood flow to the kidneys. If you need help quitting, talk to your doctor about stop-smoking programs and medicines. These can increase your chances of quitting for good. Limit your use of alcohol and avoid illegal drugs. Talk to your doctor about an exercise plan. Exercise helps lower your blood pressure. It also makes you feel better. If you have an advance directive, let your doctor know. It may include a living will and a durable power of  for health care. If you don't have one, you may want to prepare one. It lets your doctor and loved ones know your health care wishes if you become unable to speak for yourself. Diet    Talk to a registered dietitian. They can help you make a meal plan that is right for you. Most people with kidney disease need to limit salt (sodium), fluids, and protein. Some also have to limit potassium and phosphorus. You may have to give up many foods you like. But try to focus on the fact that this will help you stay healthy for as long as possible. If you have a hard time eating enough, talk to your doctor or dietitian about ways to add calories to your diet. Your diet may change as your disease changes. See your doctor for regular testing. And work with a dietitian to change your diet as needed. When should you call for help? Call 911 anytime you think you may need emergency care. For example, call if:    You passed out (lost consciousness). Call your doctor now or seek immediate medical care if:    You have less urine than normal or no urine. You have trouble urinating or can urinate only very small amounts. You are confused or have trouble thinking clearly. You feel weaker or more tired than usual.     You are very thirsty, lightheaded, or dizzy. You have nausea and vomiting.      You have new swelling of your arms or feet, or your swelling is worse. You have blood in your urine. You have new or worse trouble breathing. Watch closely for changes in your health, and be sure to contact your doctor if:    You have any problems with your medicine or other treatment. Where can you learn more? Go to http://www.gray.com/  Enter N276 in the search box to learn more about \"Chronic Kidney Disease: Care Instructions. \"  Current as of: May 4, 2022               Content Version: 13.4  © 2006-2022 [employer] Care instructions adapted under license by [employer] (which disclaims liability or warranty for this information). If you have questions about a medical condition or this instruction, always ask your healthcare professional. [address] any warranty or liability for your use of this information. Medicines to Avoid With Kidney Disease: Care Instructions  Overview     Kidney disease means that your kidneys are not able to get rid of waste from the blood. So they can't keep your body's fluids and chemicals in balance. Usually, the kidneys get rid of waste from the blood through the urine. And they balance the fluids in the body. When your kidneys don't work as they should, you have to be careful about some medicines. They may harm your kidneys. Your doctor may tell you not to take them or may change the dose. Medicines for pain and swelling, such as ibuprofen (Advil or Motrin) or naproxen (Aleve), can cause harm. So can some antibiotics and antacids. And you need to be careful about some drugs that treat cancer, lower blood pressure, or get rid of water from the body. Some herbal products could cause harm too. Follow-up care is a key part of your treatment and safety. Be sure to make and go to all appointments, and call your doctor if you are having problems.  It's also a good idea to know your test results and keep a list of the medicines you take. How can you care for yourself at home? Tell your doctor all the prescription, herbal, or over-the-counter medicines you take. Do not take any new ones unless you talk to your doctor first.  Preston Bean not take anti-inflammatory medicines. These include ibuprofen (Advil, Motrin) and naproxen (Aleve). You can use acetaminophen (Tylenol) for pain. Do not take two or more pain medicines at the same time unless the doctor told you to. Many pain medicines have acetaminophen, which is Tylenol. Too much acetaminophen (Tylenol) can be harmful. Tell all doctors and others who work with your health care that you have kidney disease. Wear medical alert jewelry that lists your health problem. You can buy this at most drugstores. Where can you learn more? Go to http://www.gray.com/  Enter M780 in the search box to learn more about \"Medicines to Avoid With Kidney Disease: Care Instructions. \"  Current as of: May 4, 2022               Content Version: 13.4  © 2006-2022 [employer]. Care instructions adapted under license by [employer] (which disclaims liability or warranty for this information). If you have questions about a medical condition or this instruction, always ask your healthcare professional. [address] any warranty or liability for your use of this information.

## 2023-03-01 NOTE — PROGRESS NOTES
Chief Complaint   Patient presents with    Follow-up     Visit Vitals  /80 (BP 1 Location: Left upper arm, BP Patient Position: Sitting, BP Cuff Size: Large adult)   Pulse 75   Resp 18   Ht 5' (1.524 m)   Wt 221 lb 1.6 oz (100.3 kg)   LMP 11/01/2012   SpO2 99%   BMI 43.18 kg/m²         1. \"Have you been to the ER, urgent care clinic since your last visit? Hospitalized since your last visit? \" No    2. \"Have you seen or consulted any other health care providers outside of the [address] [place] [address] since your last visit? \" No     3. For patients aged 39-70: Has the patient had a colonoscopy / FIT/ Cologuard? No      If the patient is female:    4. For patients aged 41-77: Has the patient had a mammogram within the past 2 years? No      5. For patients aged 21-65: Has the patient had a pap smear?  No

## 2023-03-02 LAB
ALBUMIN SERPL-MCNC: 4.3 G/DL (ref 3.5–5)
ALBUMIN/GLOB SERPL: 1.5 (ref 1.1–2.2)
ALP SERPL-CCNC: 136 U/L (ref 45–117)
ALT SERPL-CCNC: 29 U/L (ref 12–78)
ANION GAP SERPL CALC-SCNC: 4 MMOL/L (ref 5–15)
AST SERPL-CCNC: 16 U/L (ref 15–37)
BILIRUB SERPL-MCNC: 0.5 MG/DL (ref 0.2–1)
BUN SERPL-MCNC: 13 MG/DL (ref 6–20)
BUN/CREAT SERPL: 12 (ref 12–20)
CALCIUM SERPL-MCNC: 9.4 MG/DL (ref 8.5–10.1)
CHLORIDE SERPL-SCNC: 107 MMOL/L (ref 97–108)
CO2 SERPL-SCNC: 28 MMOL/L (ref 21–32)
CREAT SERPL-MCNC: 1.08 MG/DL (ref 0.55–1.02)
CREAT UR-MCNC: 14.9 MG/DL
GLOBULIN SER CALC-MCNC: 2.9 G/DL (ref 2–4)
GLUCOSE SERPL-MCNC: 82 MG/DL (ref 65–100)
MICROALBUMIN UR-MCNC: <0.5 MG/DL
MICROALBUMIN/CREAT UR-RTO: <34 MG/G (ref 0–30)
POTASSIUM SERPL-SCNC: 4.3 MMOL/L (ref 3.5–5.1)
PROT SERPL-MCNC: 7.2 G/DL (ref 6.4–8.2)
SODIUM SERPL-SCNC: 139 MMOL/L (ref 136–145)

## 2023-03-02 NOTE — PROGRESS NOTES
Please call the patient regarding her diagnostic evaluation.   Renal function stable, stable mild elevation of alkaline phosphatase, no microalbuminuria, continue with current care

## 2023-04-21 DIAGNOSIS — M54.2 CERVICALGIA: Primary | ICD-10-CM

## 2023-09-26 DIAGNOSIS — G89.29 OTHER CHRONIC PAIN: Primary | ICD-10-CM

## 2023-09-26 RX ORDER — METAXALONE 800 MG/1
800 TABLET ORAL 2 TIMES DAILY
Qty: 60 TABLET | Refills: 0 | Status: SHIPPED | OUTPATIENT
Start: 2023-09-26

## 2023-09-26 RX ORDER — PREGABALIN 100 MG/1
100 CAPSULE ORAL 2 TIMES DAILY
Qty: 60 CAPSULE | Refills: 0 | Status: SHIPPED | OUTPATIENT
Start: 2023-09-26 | End: 2023-10-26

## 2023-09-26 NOTE — TELEPHONE ENCOUNTER
metaxalone (SKELAXIN) 800 mg tablet 60 Tablet 0 2/27/2023    Sig - Route: Take 1 Tablet by mouth two (2) ti    Disp Refills Start End   pregabalin (LYRICA) 100 mg capsule 60 Capsule 0 2/27/2023    Sig - Route: Take 1 Capsule by mouth two (2) times a day. Max Daily Amount: 200 mg. - Oralmes a day.  - Oral    Last visit:  3/1/23 Dr. Wing Ritchie  Future appt:  12/12/23 NP [address]:  [address] [facility]

## 2023-10-24 RX ORDER — METAXALONE 800 MG/1
800 TABLET ORAL 2 TIMES DAILY
Qty: 60 TABLET | Refills: 0 | Status: SHIPPED | OUTPATIENT
Start: 2023-10-24

## 2023-10-24 NOTE — TELEPHONE ENCOUNTER
Last Refill: 9-26-23  Last Visit: 3-1-23 Rome Billeric  Next Visit: 12/12/2023 Janie Farley    Requested Prescriptions     Pending Prescriptions Disp Refills    metaxalone (SKELAXIN) 800 MG tablet [Pharmacy Med Name: METAXALONE 800 MG TABLET] 60 tablet 0     Sig: TAKE 1 TABLET BY MOUTH TWICE A DAY

## 2023-10-26 DIAGNOSIS — G89.29 OTHER CHRONIC PAIN: ICD-10-CM

## 2023-10-26 RX ORDER — PREGABALIN 100 MG/1
100 CAPSULE ORAL 2 TIMES DAILY
Qty: 60 CAPSULE | Refills: 0 | Status: CANCELLED | OUTPATIENT
Start: 2023-10-26 | End: 2023-11-25

## 2023-10-27 NOTE — TELEPHONE ENCOUNTER
Last Refill: 9-26-23  Last Visit: 3-1-23 Ernestine Riedel  Next Visit: 10/26/2023 Sergio Jerez    Requested Prescriptions     Pending Prescriptions Disp Refills    pregabalin (LYRICA) 100 MG capsule [Pharmacy Med Name: PREGABALIN 100 MG CAPSULE] 60 capsule 0     Sig: Take 1 capsule by mouth 2 times daily for 30 days.  Max Daily Amount: 200 mg

## 2023-10-30 RX ORDER — PREGABALIN 100 MG/1
100 CAPSULE ORAL 2 TIMES DAILY
Qty: 60 CAPSULE | Refills: 0 | Status: SHIPPED | OUTPATIENT
Start: 2023-10-30 | End: 2023-11-29

## 2023-11-27 NOTE — TELEPHONE ENCOUNTER
PCP: Amy Lugo MD    Last appt: 3/1/2023    Future Appointments   Date Time Provider [address]   12/12/2023  1:30 PM CAROL ANN Pleitez - NP [facility]       Requested Prescriptions     Pending Prescriptions Disp Refills    metaxalone (SKELAXIN) 800 MG tablet [Pharmacy Med Name: METAXALONE 800 MG TABLET] 60 tablet 0     Sig: TAKE 1 TABLET BY MOUTH TWICE A DAY

## 2023-11-28 DIAGNOSIS — G89.29 OTHER CHRONIC PAIN: ICD-10-CM

## 2023-11-28 RX ORDER — METAXALONE 800 MG/1
800 TABLET ORAL 2 TIMES DAILY
Qty: 60 TABLET | Refills: 0 | Status: SHIPPED | OUTPATIENT
Start: 2023-11-28

## 2023-11-29 RX ORDER — PREGABALIN 100 MG/1
100 CAPSULE ORAL 2 TIMES DAILY
Qty: 60 CAPSULE | Refills: 0 | Status: SHIPPED | OUTPATIENT
Start: 2023-11-29 | End: 2023-12-29

## 2023-11-29 NOTE — TELEPHONE ENCOUNTER
Requested Prescriptions     Pending Prescriptions Disp Refills    pregabalin (LYRICA) 100 MG capsule [Pharmacy Med Name: PREGABALIN 100 MG CAPSULE] 60 capsule 0     Sig: Take 1 capsule by mouth 2 times daily for 30 days. Max Daily Amount: 200 mg       RX refill request from the patient/pharmacy. Patient last seen 3/1/23 with labs, and next appt. scheduled for 12/12/23.

## 2023-12-11 SDOH — HEALTH STABILITY: PHYSICAL HEALTH: ON AVERAGE, HOW MANY DAYS PER WEEK DO YOU ENGAGE IN MODERATE TO STRENUOUS EXERCISE (LIKE A BRISK WALK)?: 0 DAYS

## 2023-12-12 ENCOUNTER — OFFICE VISIT (OUTPATIENT)
Facility: CLINIC | Age: 53
End: 2023-12-12
Payer: COMMERCIAL

## 2023-12-12 VITALS
HEART RATE: 81 BPM | RESPIRATION RATE: 18 BRPM | SYSTOLIC BLOOD PRESSURE: 130 MMHG | TEMPERATURE: 98.1 F | OXYGEN SATURATION: 97 % | DIASTOLIC BLOOD PRESSURE: 70 MMHG | BODY MASS INDEX: 43.8 KG/M2 | WEIGHT: 223.1 LBS | HEIGHT: 60 IN

## 2023-12-12 DIAGNOSIS — E78.2 MIXED HYPERLIPIDEMIA: ICD-10-CM

## 2023-12-12 DIAGNOSIS — F32.1 MODERATE MAJOR DEPRESSION (HCC): ICD-10-CM

## 2023-12-12 DIAGNOSIS — Z11.4 ENCOUNTER FOR SCREENING FOR HIV: ICD-10-CM

## 2023-12-12 DIAGNOSIS — J45.20 MILD INTERMITTENT ASTHMA WITHOUT COMPLICATION: ICD-10-CM

## 2023-12-12 DIAGNOSIS — Z86.19 HX OF LYME DISEASE: ICD-10-CM

## 2023-12-12 DIAGNOSIS — L80 VITILIGO: ICD-10-CM

## 2023-12-12 DIAGNOSIS — K21.00 GASTROESOPHAGEAL REFLUX DISEASE WITH ESOPHAGITIS WITHOUT HEMORRHAGE: ICD-10-CM

## 2023-12-12 DIAGNOSIS — I10 PRIMARY HYPERTENSION: ICD-10-CM

## 2023-12-12 DIAGNOSIS — Z23 ENCOUNTER FOR IMMUNIZATION: Primary | ICD-10-CM

## 2023-12-12 DIAGNOSIS — L40.9 PSORIASIS: ICD-10-CM

## 2023-12-12 DIAGNOSIS — R49.0 RASPY VOICE: ICD-10-CM

## 2023-12-12 PROCEDURE — 3075F SYST BP GE 130 - 139MM HG: CPT | Performed by: NURSE PRACTITIONER

## 2023-12-12 PROCEDURE — 90471 IMMUNIZATION ADMIN: CPT | Performed by: NURSE PRACTITIONER

## 2023-12-12 PROCEDURE — 99213 OFFICE O/P EST LOW 20 MIN: CPT | Performed by: NURSE PRACTITIONER

## 2023-12-12 PROCEDURE — 90750 HZV VACC RECOMBINANT IM: CPT | Performed by: NURSE PRACTITIONER

## 2023-12-12 PROCEDURE — 3078F DIAST BP <80 MM HG: CPT | Performed by: NURSE PRACTITIONER

## 2023-12-12 RX ORDER — CELECOXIB 100 MG/1
100 CAPSULE ORAL DAILY
COMMUNITY
Start: 2023-12-01

## 2023-12-12 ASSESSMENT — ENCOUNTER SYMPTOMS
COUGH: 0
SHORTNESS OF BREATH: 0
PHOTOPHOBIA: 0
ABDOMINAL PAIN: 0
CONSTIPATION: 0
CHOKING: 0
COLOR CHANGE: 0
WHEEZING: 0
RECTAL PAIN: 0
SORE THROAT: 0
NAUSEA: 0
VOICE CHANGE: 1
VOMITING: 0
FACIAL SWELLING: 0
DIARRHEA: 0
APNEA: 0
TROUBLE SWALLOWING: 0
EYE PAIN: 0
BLOOD IN STOOL: 0
BACK PAIN: 0
SINUS PRESSURE: 0
EYE REDNESS: 0
SINUS PAIN: 0
ANAL BLEEDING: 0
EYE DISCHARGE: 0

## 2023-12-12 NOTE — PROGRESS NOTES
Chris Campbell (: 1970) is a 48 y.o. female here for evaluation of the following chief complaint(s):  Established New Doctor (New to provider establish care )         SUBJECTIVE/OBJECTIVE:  HPI    Ms. Da Silva, 47 yo female, here today to establish with new provider. She was last seen in  by previous provider. PMH includes asthma, Chronic pain, CKD stage G3a/A1, GFR 45-59 and albumin creatinine ratio <30 mg/g (HCC), Class 3 severe obesity due to excess calories with serious comorbidity and body mass index (BMI) of 40.0 to 44.9 in [facility]), Depression, GERD (gastroesophageal reflux disease), History of acute sinusitis (2022), History of bronchitis (2022), History of palpitations, Hypercholesterolemia (2022), Hypertension, Primary osteoarthritis involving multiple joints, Vitamin D deficiency, and Vitiligo. She stopped seeing pain management about a year ago-  PCP has been prescribing skelexin and lyrica. Recent addition of celebrex by way of telehealth doc after experiencing increased joint pain during and after gi. Was prescribed in interim until she sees a rheumatologist.      Today she complains of increased raspy voice over past couple days. Rheumatologist-  Dr. Simin Pulliam- new patient appt upcoming  Gynecologist:  Dr. Lauri Bernal  Gastroenterologist:  Dr. Lynette Bonilla    Allergies   Allergen Reactions    Sulfa Antibiotics Hives and Itching       Current Outpatient Medications   Medication Sig Dispense Refill    celecoxib (CELEBREX) 100 MG capsule Take 1 capsule by mouth daily      pregabalin (LYRICA) 100 MG capsule Take 1 capsule by mouth 2 times daily for 30 days.  Max Daily Amount: 200 mg 60 capsule 0    metaxalone (SKELAXIN) 800 MG tablet TAKE 1 TABLET BY MOUTH TWICE A DAY 60 tablet 0    albuterol sulfate HFA (PROVENTIL;VENTOLIN;PROAIR) 108 (90 Base) MCG/ACT inhaler INHALE 2 PUFFS EVERY 6 HOURS AS NEEDED FOR WHEEZE      augmented betamethasone dipropionate (DIPROLENE-AF)

## 2023-12-26 RX ORDER — METAXALONE 800 MG/1
800 TABLET ORAL 2 TIMES DAILY
Qty: 60 TABLET | Refills: 3 | Status: SHIPPED | OUTPATIENT
Start: 2023-12-26

## 2023-12-26 NOTE — TELEPHONE ENCOUNTER
PCP: CAROL ANN Corral NP    Last appt: 12/12/2023    Future Appointments   Date Time Provider [address]   6/12/2024  8:00 AM LAB ONLY ERICKA HOFFMAN   6/17/2024 10:30 AM CAROL ANN Corral NP       Requested Prescriptions     Pending Prescriptions Disp Refills    metaxalone (SKELAXIN) 800 MG tablet [Pharmacy Med Name: METAXALONE 800 MG TABLET] 60 tablet 0     Sig: TAKE 1 TABLET BY MOUTH TWICE A DAY

## 2024-01-01 DIAGNOSIS — G89.29 OTHER CHRONIC PAIN: ICD-10-CM

## 2024-01-02 DIAGNOSIS — G89.29 OTHER CHRONIC PAIN: ICD-10-CM

## 2024-01-03 RX ORDER — PREGABALIN 100 MG/1
100 CAPSULE ORAL 2 TIMES DAILY
Qty: 60 CAPSULE | Refills: 3 | Status: SHIPPED | OUTPATIENT
Start: 2024-01-03 | End: 2024-05-02

## 2024-01-03 RX ORDER — PREGABALIN 100 MG/1
100 CAPSULE ORAL 2 TIMES DAILY
Qty: 60 CAPSULE | Refills: 0 | OUTPATIENT
Start: 2024-01-03 | End: 2024-02-02

## 2024-01-03 NOTE — TELEPHONE ENCOUNTER
PCP: Martínez Quintero APRN - NP    Last appt: Visit date not found  Future Appointments   Date Time Provider Department Center   6/12/2024  8:00 AM LAB ONLY [facility]   6/17/2024 10:30 AM Martínez Quintero APRN - NP [facility]       Requested Prescriptions     Pending Prescriptions Disp Refills    pregabalin (LYRICA) 100 MG capsule 60 capsule 0     Sig: Take 1 capsule by mouth 2 times daily for 30 days. Max Daily Amount: 200 mg       Prior labs and Blood pressures:  BP Readings from Last 3 Encounters:   12/12/23 130/70   03/01/23 120/80   10/17/22 (!) 165/101     Lab Results   Component Value Date/Time     03/01/2023 11:52 AM    K 4.3 03/01/2023 11:52 AM     03/01/2023 11:52 AM    CO2 28 03/01/2023 11:52 AM    BUN 13 03/01/2023 11:52 AM    GFRAA >60 09/13/2022 08:25 AM     No results found for: \"HBA1C\", \"[id number]\"  Lab Results   Component Value Date/Time    CHOL 225 08/30/2022 11:57 AM    HDL 63 08/30/2022 11:57 AM    VLDL 17 03/02/2021 10:42 AM     No results found for: \"VITD3\", \"VD3RIA\"        Lab Results   Component Value Date/Time    TSH 1.39 11/09/2021 10:23 AM

## 2024-01-12 NOTE — TELEPHONE ENCOUNTER
PCP: Martínez Quintero APRN - NP    Last appt: 12/12/2023  Future Appointments   Date Time Provider Department Center   6/12/2024  8:00 AM LAB ONLY [facility]   6/17/2024 10:30 AM Martínez Quintero APRN - NP [facility]       Requested Prescriptions     Pending Prescriptions Disp Refills    vitamin D3 (CHOLECALCIFEROL) 125 MCG (5000 UT) TABS tablet 30 tablet 11     Sig: Take 1 tablet by mouth daily       Prior labs and Blood pressures:  BP Readings from Last 3 Encounters:   12/12/23 130/70   03/01/23 120/80   10/17/22 (!) 165/101     Lab Results   Component Value Date/Time     03/01/2023 11:52 AM    K 4.3 03/01/2023 11:52 AM     03/01/2023 11:52 AM    CO2 28 03/01/2023 11:52 AM    BUN 13 03/01/2023 11:52 AM    GFRAA >60 09/13/2022 08:25 AM     No results found for: \"HBA1C\", \"[id number]\"  Lab Results   Component Value Date/Time    CHOL 225 08/30/2022 11:57 AM    HDL 63 08/30/2022 11:57 AM    VLDL 17 03/02/2021 10:42 AM     No results found for: \"VITD3\", \"VD3RIA\"        Lab Results   Component Value Date/Time    TSH 1.39 11/09/2021 10:23 AM

## 2024-02-26 RX ORDER — ALBUTEROL SULFATE 90 UG/1
AEROSOL, METERED RESPIRATORY (INHALATION)
Qty: 18 G | Refills: 1 | Status: SHIPPED | OUTPATIENT
Start: 2024-02-26

## 2024-02-26 NOTE — TELEPHONE ENCOUNTER
PCP: No primary care provider on file.    Last appt:12/12/2023    Future Appointments   Date Time Provider Department Center   6/12/2024  8:00 AM LAB ONLY [facility]   6/17/2024 10:30 AM Martínez Quintero APRN - NP [facility]       Requested Prescriptions     Pending Prescriptions Disp Refills    albuterol sulfate HFA (PROVENTIL;VENTOLIN;PROAIR) 108 (90 Base) MCG/ACT inhaler 18 g 1     Sig: INHALE 2 PUFFS EVERY 6 HOURS AS NEEDED FOR WHEEZE

## 2024-03-19 RX ORDER — LISINOPRIL 2.5 MG/1
2.5 TABLET ORAL DAILY
Qty: 90 TABLET | Refills: 1 | Status: SHIPPED | OUTPATIENT
Start: 2024-03-19

## 2024-03-19 NOTE — TELEPHONE ENCOUNTER
PCP: No primary care provider on file.    Last appt: Visit date not found  Future Appointments   Date Time Provider Department Center   6/12/2024  8:00 AM LAB ONLY [facility]   6/17/2024 10:30 AM Martínez Quintero APRN - NP [facility]       Requested Prescriptions     Pending Prescriptions Disp Refills    lisinopril (PRINIVIL;ZESTRIL) 2.5 MG tablet 90 tablet 1     Sig: Take 1 tablet by mouth daily       Prior labs and Blood pressures:  BP Readings from Last 3 Encounters:   12/12/23 130/70   03/01/23 120/80   10/17/22 (!) 165/101     Lab Results   Component Value Date/Time     03/01/2023 11:52 AM    K 4.3 03/01/2023 11:52 AM     03/01/2023 11:52 AM    CO2 28 03/01/2023 11:52 AM    BUN 13 03/01/2023 11:52 AM    GFRAA >60 09/13/2022 08:25 AM     No results found for: \"HBA1C\", \"[id number]\"  Lab Results   Component Value Date/Time    CHOL 225 08/30/2022 11:57 AM    HDL 63 08/30/2022 11:57 AM    VLDL 17 03/02/2021 10:42 AM     No results found for: \"VITD3\", \"VD3RIA\"        Lab Results   Component Value Date/Time    TSH 1.39 11/09/2021 10:23 AM

## 2024-03-22 RX ORDER — SPIRONOLACTONE 50 MG/1
50 TABLET, FILM COATED ORAL DAILY
Qty: 30 TABLET | Refills: 2 | Status: SHIPPED | OUTPATIENT
Start: 2024-03-22

## 2024-03-22 RX ORDER — OMEPRAZOLE 40 MG/1
40 CAPSULE, DELAYED RELEASE ORAL DAILY
Qty: 30 CAPSULE | Refills: 2 | Status: SHIPPED | OUTPATIENT
Start: 2024-03-22 | End: 2024-06-20

## 2024-03-22 RX ORDER — BUPROPION HYDROCHLORIDE 300 MG/1
300 TABLET ORAL DAILY
Qty: 90 TABLET | Refills: 0 | Status: SHIPPED | OUTPATIENT
Start: 2024-03-22

## 2024-03-22 NOTE — TELEPHONE ENCOUNTER
PCP: No primary care provider on file.    Last appt: Visit date not found  No future appointments.    Requested Prescriptions     Pending Prescriptions Disp Refills    spironolactone (ALDACTONE) 50 MG tablet 30 tablet 2     Sig: Take 1 tablet by mouth daily    buPROPion (WELLBUTRIN XL) 300 MG extended release tablet 90 tablet 0     Sig: Take 1 tablet by mouth daily    omeprazole (PRILOSEC) 40 MG delayed release capsule 30 capsule 2     Sig: Take 1 capsule by mouth daily       Prior labs and Blood pressures:  BP Readings from Last 3 Encounters:   12/12/23 130/70   03/01/23 120/80   10/17/22 (!) 165/101     Lab Results   Component Value Date/Time     03/01/2023 11:52 AM    K 4.3 03/01/2023 11:52 AM     03/01/2023 11:52 AM    CO2 28 03/01/2023 11:52 AM    BUN 13 03/01/2023 11:52 AM    GFRAA >60 09/13/2022 08:25 AM     No results found for: \"HBA1C\", \"[id number]\"  Lab Results   Component Value Date/Time    CHOL 225 08/30/2022 11:57 AM    HDL 63 08/30/2022 11:57 AM    VLDL 17 03/02/2021 10:42 AM     No results found for: \"VITD3\", \"VD3RIA\"        Lab Results   Component Value Date/Time    TSH 1.39 11/09/2021 10:23 AM

## 2024-04-29 RX ORDER — METAXALONE 800 MG/1
800 TABLET ORAL 2 TIMES DAILY
Qty: 60 TABLET | Refills: 0 | Status: SHIPPED | OUTPATIENT
Start: 2024-04-29

## 2024-04-29 NOTE — TELEPHONE ENCOUNTER
RX refill request from the patient/pharmacy. Patient last seen 12- with labs, and does not have a f/up with a PCP.  Requested Prescriptions     Pending Prescriptions Disp Refills    metaxalone (SKELAXIN) 800 MG tablet 60 tablet 0     Sig: Take 1 tablet by mouth 2 times daily

## 2024-05-03 DIAGNOSIS — G89.29 OTHER CHRONIC PAIN: ICD-10-CM

## 2024-05-03 RX ORDER — PREGABALIN 100 MG/1
100 CAPSULE ORAL 2 TIMES DAILY
Qty: 60 CAPSULE | Refills: 0 | Status: SHIPPED | OUTPATIENT
Start: 2024-05-03 | End: 2024-08-31

## 2024-05-03 NOTE — TELEPHONE ENCOUNTER
PCP: No primary care provider on file.    Last appt: PATIENT IS ON THE LIST TO SEE DR. MADDOX    No future appointments.    Requested Prescriptions     Pending Prescriptions Disp Refills    pregabalin (LYRICA) 100 MG capsule 60 capsule 0     Sig: Take 1 capsule by mouth 2 times daily for 120 days. Max Daily Amount: 200 mg

## 2024-05-06 RX ORDER — SPIRONOLACTONE 50 MG/1
50 TABLET, FILM COATED ORAL DAILY
Qty: 90 TABLET | Refills: 1 | Status: SHIPPED | OUTPATIENT
Start: 2024-05-06

## 2024-05-06 RX ORDER — OMEPRAZOLE 40 MG/1
CAPSULE, DELAYED RELEASE ORAL DAILY
Qty: 90 CAPSULE | Refills: 1 | Status: SHIPPED | OUTPATIENT
Start: 2024-05-06

## 2024-05-06 NOTE — TELEPHONE ENCOUNTER
PCP: No primary care provider on file.    Last appt: 12/12/2023    No future appointments.    Requested Prescriptions     Pending Prescriptions Disp Refills    spironolactone (ALDACTONE) 50 MG tablet [Pharmacy Med Name: SPIRONOLACTONE 50 MG TABLET] 90 tablet 1     Sig: TAKE 1 TABLET BY MOUTH EVERY DAY    omeprazole (PRILOSEC) 40 MG delayed release capsule [Pharmacy Med Name: OMEPRAZOLE DR 40 MG CAPSULE] 90 capsule 1     Sig: TAKE 1 CAPSULE BY MOUTH EVERY DAY

## 2024-05-29 ENCOUNTER — OFFICE VISIT (OUTPATIENT)
Age: 54
End: 2024-05-29

## 2024-05-29 VITALS
RESPIRATION RATE: 18 BRPM | WEIGHT: 218 LBS | TEMPERATURE: 98.6 F | OXYGEN SATURATION: 98 % | BODY MASS INDEX: 42.58 KG/M2 | DIASTOLIC BLOOD PRESSURE: 92 MMHG | HEART RATE: 92 BPM | SYSTOLIC BLOOD PRESSURE: 141 MMHG

## 2024-05-29 DIAGNOSIS — J18.9 COMMUNITY ACQUIRED PNEUMONIA OF RIGHT LUNG, UNSPECIFIED PART OF LUNG: Primary | ICD-10-CM

## 2024-05-29 RX ORDER — METAXALONE 800 MG/1
800 TABLET ORAL 2 TIMES DAILY
Qty: 60 TABLET | Refills: 0 | OUTPATIENT
Start: 2024-05-29

## 2024-05-29 RX ORDER — METHOTREXATE 25 MG/ML
INJECTION INTRA-ARTERIAL; INTRAMUSCULAR; INTRATHECAL; INTRAVENOUS
COMMUNITY
Start: 2024-05-07

## 2024-05-29 RX ORDER — METHYLPREDNISOLONE 4 MG/1
TABLET ORAL
Qty: 21 KIT | Refills: 0 | Status: SHIPPED | OUTPATIENT
Start: 2024-05-29

## 2024-05-29 RX ORDER — FOLIC ACID 1 MG/1
TABLET ORAL
COMMUNITY
Start: 2024-04-16

## 2024-05-29 RX ORDER — ALBUTEROL SULFATE 90 UG/1
AEROSOL, METERED RESPIRATORY (INHALATION)
Qty: 18 G | Refills: 1 | Status: SHIPPED | OUTPATIENT
Start: 2024-05-29

## 2024-05-29 RX ORDER — DOXYCYCLINE HYCLATE 100 MG
100 TABLET ORAL 2 TIMES DAILY
Qty: 14 TABLET | Refills: 0 | Status: SHIPPED | OUTPATIENT
Start: 2024-05-29 | End: 2024-06-05

## 2024-05-29 ASSESSMENT — ENCOUNTER SYMPTOMS
COUGH: 1
SHORTNESS OF BREATH: 0

## 2024-05-29 NOTE — PATIENT INSTRUCTIONS
Thank you for visiting [facility] today.    Nasal Congestion:  Flonase (over the counter) nasal spray, once a day  Saline irrigation kits help wash out sinuses 1-2 times a day  Normal saline nasal spray  Afrin nasal spray for no more than 3-5 days    Cough:  Throat lozenges, hot tea, and honey may help  Vicks VapoRub at night to help with cough and relieve muscles aches and pain  If not prescribed a cough medication, Delsym or Robitussin are options.  It is an over the counter cough medication containing dextromethorphan to help suppress cough at night  If you have high blood pressure, take Coricidin HBP (or the generic form) instead.  Follow instructions on the box.  For thick mucus, take Mucinex (with Guafenesin only) to help thin the mucus.  Follow instructions on the box.  You will need to drink plenty of water with this medication.    Sore Throat:  Lozenges, as needed. Cepacol lozenges will help numb the throat  Chloraseptic spray also helps to numb throat pain  Salt water gargles to soothe throat pain    Sinus pain/pressure:  Warm, wet towel on face to help with facial sinus pain/pressure    Headache/Pain Fever/Body Aches:  If you can take NSAIDs, take Ibuprofen 400-800mg every 8 hours as needed  If you cannot take NSAIDs, take Tylenol 325-500mg every 6 hours as needed    Miscellanous:  Zyrtec/Xyzal/Allegra/Claritin during the day or Benadryl at night may help with allergies.  These medications also come in decongestant forms and may be used if you are having nasal/sinus congestion.  Simple foods like chicken noodle soup, smoothies, hot tea with lemon and honey may also provide some relief.  Cool mist humidifier  Stay hydrated  Vitamin C 75-90 mg daily  Zinc 40 mg daily    Please follow up with your primary care provider within 2-5 days if your signs and symptoms have not resolved or worsened.  Please be mindful of features that warrant immediate attention:  new onset fever, difficulty breathing,

## 2024-05-29 NOTE — PROGRESS NOTES
(low risk Wells score), pneumothorax.  Patient is nontoxic appearing and not in need of emergent medical intervention.    The patient is a good candidate for outpatient therapy based on normal PO intake, reassuring exam with clear lungs on auscultation, normal oxygen saturations, and lack of respiratory distress upon discharge.    Discharge decision based on the following:  clinical impression is consistent with outpatient treatment, patient's exam is stable, and patient's condition is stable.    -Provided reassurance and reassessment  -Discussed over-the-counter medications for symptomatic relief  -Provided educational material and patient instructions  -Discharged patient with instructions to follow up with PCP  -Advised to immediately to the ED for worsening or persistent symptoms    X-ray at clinic unavailable until 1300.  She will return for chest x-ray to rule out pna.    I have discussed the results, diagnosis and treatment plan with the patient.  The patient also understands that early in the process of an illness, an urgent care workup can be falsely reassuring.  Routine discharge counseling and specific return precautions discussed with patient and the patient understands that worsening, changing or persistent symptoms should prompt an immediate return to the urgent care or emergency department.  Patient/Guardian expressed understanding and agrees with the discharge plan.  No further questions at time of discharge.    Rachel Vazquez, APRN - CNP

## 2024-06-06 DIAGNOSIS — G89.29 OTHER CHRONIC PAIN: ICD-10-CM

## 2024-06-06 RX ORDER — OMEPRAZOLE 40 MG/1
CAPSULE, DELAYED RELEASE ORAL DAILY
Qty: 90 CAPSULE | Refills: 1 | Status: CANCELLED | OUTPATIENT
Start: 2024-06-06

## 2024-06-06 RX ORDER — PREGABALIN 100 MG/1
100 CAPSULE ORAL 2 TIMES DAILY
Qty: 60 CAPSULE | Refills: 0 | Status: CANCELLED | OUTPATIENT
Start: 2024-06-06 | End: 2024-10-04

## 2024-06-06 RX ORDER — SPIRONOLACTONE 50 MG/1
50 TABLET, FILM COATED ORAL DAILY
Qty: 90 TABLET | Refills: 1 | Status: CANCELLED | OUTPATIENT
Start: 2024-06-06

## 2024-06-06 NOTE — TELEPHONE ENCOUNTER
PCP: No, Pcp    Last appt: Visit date not found    No future appointments.    Requested Prescriptions     Pending Prescriptions Disp Refills    lisinopril (PRINIVIL;ZESTRIL) 2.5 MG tablet 30 tablet 0     Sig: Take 1 tablet by mouth daily    buPROPion (WELLBUTRIN XL) 300 MG extended release tablet 30 tablet 0     Sig: Take 1 tablet by mouth daily    metaxalone (SKELAXIN) 800 MG tablet 30 tablet 0     Sig: Take 1 tablet by mouth 2 times daily

## 2024-06-07 DIAGNOSIS — G89.29 OTHER CHRONIC PAIN: ICD-10-CM

## 2024-06-07 RX ORDER — METAXALONE 800 MG/1
800 TABLET ORAL 2 TIMES DAILY
Qty: 30 TABLET | Refills: 0 | Status: SHIPPED | OUTPATIENT
Start: 2024-06-07

## 2024-06-07 RX ORDER — LISINOPRIL 2.5 MG/1
2.5 TABLET ORAL DAILY
Qty: 30 TABLET | Refills: 0 | Status: SHIPPED | OUTPATIENT
Start: 2024-06-07

## 2024-06-07 RX ORDER — PREGABALIN 100 MG/1
CAPSULE ORAL
Qty: 60 CAPSULE | Refills: 0 | Status: SHIPPED | OUTPATIENT
Start: 2024-06-07 | End: 2024-07-07

## 2024-06-07 RX ORDER — FAMOTIDINE 40 MG/1
40 TABLET, FILM COATED ORAL DAILY
Qty: 30 TABLET | Refills: 0 | Status: SHIPPED | OUTPATIENT
Start: 2024-06-07

## 2024-06-07 RX ORDER — BUPROPION HYDROCHLORIDE 300 MG/1
300 TABLET ORAL DAILY
Qty: 30 TABLET | Refills: 0 | Status: SHIPPED | OUTPATIENT
Start: 2024-06-07

## 2024-06-07 RX ORDER — PREGABALIN 100 MG/1
100 CAPSULE ORAL 2 TIMES DAILY
Qty: 60 CAPSULE | Refills: 0 | Status: SHIPPED | OUTPATIENT
Start: 2024-06-07 | End: 2024-10-05

## 2024-06-07 NOTE — TELEPHONE ENCOUNTER
RX refill request from the patient/pharmacy. Patient last seen 12- with labs, and does not have a f/up appointment.  Requested Prescriptions     Pending Prescriptions Disp Refills    buPROPion (WELLBUTRIN XL) 300 MG extended release tablet [Pharmacy Med Name: BUPROPION HCL  MG TABLET] 30 tablet 0     Sig: TAKE 1 TABLET BY MOUTH EVERY DAY

## 2024-06-07 NOTE — TELEPHONE ENCOUNTER
Patient scheduled      
Pharmacy: [facility] [address] [address]   Patient's last visit on 12/12/24.  Current active lab orders.  Patient is on the list for Dr. Lee.      famotidine (PEPCID) 40 MG tablet [[id number]]    Order Details  Dose: 40 mg Route: Oral Frequency: DAILY   Dispense Quantity: -- Refills: --          Sig: Take 1 tablet by mouth daily     
6

## 2024-06-07 NOTE — TELEPHONE ENCOUNTER
RX refill request from the patient/pharmacy. Patient last seen 12- with labs, and does not have a f /up appointment.  Requested Prescriptions     Pending Prescriptions Disp Refills    pregabalin (LYRICA) 100 MG capsule [Pharmacy Med Name: PREGABALIN 100 MG CAPSULE] 60 capsule 0     Sig: TAKE 1 CAPSULE BY MOUTH 2 TIMES DAILY MAX DAILY AMOUNT: 200 MG

## 2024-06-07 NOTE — TELEPHONE ENCOUNTER
pregabalin (LYRICA) 100 MG capsule 60 capsule 0 5/3/2024 8/31/2024    Sig - Route: Take 1 capsule by mouth 2 times daily for 120 days. Max Daily Amount: 200 mg - Oral      Last visit 12/12/23 NORBERT Quintero  Patient on list to see Dr. Lee    Pharmacy [facility] [address]

## 2024-06-10 SDOH — HEALTH STABILITY: PHYSICAL HEALTH: ON AVERAGE, HOW MANY MINUTES DO YOU ENGAGE IN EXERCISE AT THIS LEVEL?: 0 MIN

## 2024-06-10 SDOH — HEALTH STABILITY: PHYSICAL HEALTH: ON AVERAGE, HOW MANY DAYS PER WEEK DO YOU ENGAGE IN MODERATE TO STRENUOUS EXERCISE (LIKE A BRISK WALK)?: 0 DAYS

## 2024-06-12 ENCOUNTER — OFFICE VISIT (OUTPATIENT)
Facility: CLINIC | Age: 54
End: 2024-06-12
Payer: COMMERCIAL

## 2024-06-12 VITALS
BODY MASS INDEX: 43.15 KG/M2 | SYSTOLIC BLOOD PRESSURE: 130 MMHG | DIASTOLIC BLOOD PRESSURE: 78 MMHG | HEART RATE: 72 BPM | RESPIRATION RATE: 16 BRPM | WEIGHT: 219.8 LBS | TEMPERATURE: 98.3 F | HEIGHT: 60 IN | OXYGEN SATURATION: 100 %

## 2024-06-12 DIAGNOSIS — E66.01 CLASS 3 SEVERE OBESITY DUE TO EXCESS CALORIES WITH SERIOUS COMORBIDITY AND BODY MASS INDEX (BMI) OF 40.0 TO 44.9 IN ADULT (HCC): ICD-10-CM

## 2024-06-12 DIAGNOSIS — I10 ESSENTIAL HYPERTENSION: ICD-10-CM

## 2024-06-12 DIAGNOSIS — Z00.00 ROUTINE PHYSICAL EXAMINATION: Primary | ICD-10-CM

## 2024-06-12 DIAGNOSIS — L80 VITILIGO: ICD-10-CM

## 2024-06-12 DIAGNOSIS — E78.2 MIXED HYPERLIPIDEMIA: ICD-10-CM

## 2024-06-12 DIAGNOSIS — F33.42 RECURRENT MAJOR DEPRESSIVE DISORDER, IN FULL REMISSION (HCC): ICD-10-CM

## 2024-06-12 DIAGNOSIS — K21.00 GASTROESOPHAGEAL REFLUX DISEASE WITH ESOPHAGITIS WITHOUT HEMORRHAGE: ICD-10-CM

## 2024-06-12 DIAGNOSIS — L40.50 PSORIATIC ARTHRITIS (HCC): ICD-10-CM

## 2024-06-12 PROCEDURE — 3078F DIAST BP <80 MM HG: CPT | Performed by: NURSE PRACTITIONER

## 2024-06-12 PROCEDURE — 99396 PREV VISIT EST AGE 40-64: CPT | Performed by: NURSE PRACTITIONER

## 2024-06-12 PROCEDURE — 3075F SYST BP GE 130 - 139MM HG: CPT | Performed by: NURSE PRACTITIONER

## 2024-06-12 SDOH — ECONOMIC STABILITY: HOUSING INSECURITY
IN THE LAST 12 MONTHS, WAS THERE A TIME WHEN YOU DID NOT HAVE A STEADY PLACE TO SLEEP OR SLEPT IN A SHELTER (INCLUDING NOW)?: NO

## 2024-06-12 SDOH — ECONOMIC STABILITY: INCOME INSECURITY: HOW HARD IS IT FOR YOU TO PAY FOR THE VERY BASICS LIKE FOOD, HOUSING, MEDICAL CARE, AND HEATING?: NOT HARD AT ALL

## 2024-06-12 SDOH — ECONOMIC STABILITY: FOOD INSECURITY: WITHIN THE PAST 12 MONTHS, YOU WORRIED THAT YOUR FOOD WOULD RUN OUT BEFORE YOU GOT MONEY TO BUY MORE.: NEVER TRUE

## 2024-06-12 SDOH — ECONOMIC STABILITY: FOOD INSECURITY: WITHIN THE PAST 12 MONTHS, THE FOOD YOU BOUGHT JUST DIDN'T LAST AND YOU DIDN'T HAVE MONEY TO GET MORE.: NEVER TRUE

## 2024-06-12 ASSESSMENT — PATIENT HEALTH QUESTIONNAIRE - PHQ9
5. POOR APPETITE OR OVEREATING: NOT AT ALL
10. IF YOU CHECKED OFF ANY PROBLEMS, HOW DIFFICULT HAVE THESE PROBLEMS MADE IT FOR YOU TO DO YOUR WORK, TAKE CARE OF THINGS AT HOME, OR GET ALONG WITH OTHER PEOPLE: NOT DIFFICULT AT ALL
SUM OF ALL RESPONSES TO PHQ QUESTIONS 1-9: 0
SUM OF ALL RESPONSES TO PHQ QUESTIONS 1-9: 0
8. MOVING OR SPEAKING SO SLOWLY THAT OTHER PEOPLE COULD HAVE NOTICED. OR THE OPPOSITE, BEING SO FIGETY OR RESTLESS THAT YOU HAVE BEEN MOVING AROUND A LOT MORE THAN USUAL: NOT AT ALL
SUM OF ALL RESPONSES TO PHQ QUESTIONS 1-9: 0
7. TROUBLE CONCENTRATING ON THINGS, SUCH AS READING THE NEWSPAPER OR WATCHING TELEVISION: NOT AT ALL
9. THOUGHTS THAT YOU WOULD BE BETTER OFF DEAD, OR OF HURTING YOURSELF: NOT AT ALL
SUM OF ALL RESPONSES TO PHQ QUESTIONS 1-9: 0
6. FEELING BAD ABOUT YOURSELF - OR THAT YOU ARE A FAILURE OR HAVE LET YOURSELF OR YOUR FAMILY DOWN: NOT AT ALL
1. LITTLE INTEREST OR PLEASURE IN DOING THINGS: NOT AT ALL
4. FEELING TIRED OR HAVING LITTLE ENERGY: NOT AT ALL
2. FEELING DOWN, DEPRESSED OR HOPELESS: NOT AT ALL
SUM OF ALL RESPONSES TO PHQ9 QUESTIONS 1 & 2: 0
3. TROUBLE FALLING OR STAYING ASLEEP: NOT AT ALL

## 2024-06-12 NOTE — PROGRESS NOTES
Chief Complaint   Patient presents with    Established New Doctor       SUBJECTIVE:    Frannie Larios is a 54 y.o. female who is new to me, here today for a routine physical examination as well as follow up appointment regarding current medical conditions including: Psoriatic arthritis, MDD, HTN, mixed hyperlipidemia, GERD, vitiligo, and severe obesity.  She states she is feeling relatively well overall and denies any new or acute complaints at this time.    Chiefly, the patient is happy with her methotrexate for management of her psoriatic arthritis.  She states the medication has been quite effective and helpful overall and states a significant improvement in her pain in general.  She is also on pregabalin, diclofenac gel, celecoxib, and Skelaxin.  She feels these have significantly reduced her pain overall.  She denies any adverse side effects to the medication.    She has a longstanding history of major depressive disorder and is presently taking Wellbutrin  mg daily for this.  She has concerns about continuation for long-term, as she has been on this medication for many years now.  However, she feels that the medication has been effective and feels that her symptoms continue to be in a remission state.  She denies any adverse side effects of the medication.    She has a history of mild hypertension and is currently taking a combination of lisinopril and spironolactone.  Her blood pressure has remained stable and in good control overall.  She denies any adverse side effects of the medication.  She has a history of mixed hyperlipidemia on prior labs, but does not currently take medication for this.  She denies any recent episodes of chest pain, chest pressure, shortness of breath, headaches, dizziness, blurred vision, palpitations, or syncope episodes.    She continues to take famotidine daily for management of GERD.  Her symptoms have remained stable and in good control.    Current Outpatient

## 2024-06-12 NOTE — PROGRESS NOTES
Frannie Larios is a 54 y.o. female     Chief Complaint   Patient presents with    Established New Doctor       LMP 11/01/2012     Health Maintenance Due   Topic Date Due    Hepatitis B vaccine (1 of 3 - 3-dose series) Never done    HIV screen  Never done    Colorectal Cancer Screen  Never done    COVID-19 Vaccine (4 - 2023-24 season) 09/01/2023    Depression Monitoring  03/01/2024         \"Have you been to the ER, urgent care clinic since your last visit?  Hospitalized since your last visit?\"    YES - When: approximately 3  weeks ago.  Where and Why:  [facility]    “Have you seen or consulted any other health care providers outside of [facility] since your last visit?”    NO    “Have you had a colorectal cancer screening such as a colonoscopy/FIT/Cologuard?    YES - Type: Colonoscopy - Where: [facility] Nurse/CMA to request most recent records if not in the chart     No colonoscopy on file  No cologuard on file  No FIT/FOBT on file   No flexible sigmoidoscopy on file

## 2024-06-13 LAB
ALBUMIN SERPL-MCNC: 4.2 G/DL (ref 3.5–5)
ALBUMIN/GLOB SERPL: 1.5 (ref 1.1–2.2)
ALP SERPL-CCNC: 133 U/L (ref 45–117)
ALT SERPL-CCNC: 32 U/L (ref 12–78)
ANION GAP SERPL CALC-SCNC: 4 MMOL/L (ref 5–15)
APPEARANCE UR: CLEAR
AST SERPL-CCNC: 22 U/L (ref 15–37)
BASOPHILS # BLD: 0.1 K/UL (ref 0–0.1)
BASOPHILS NFR BLD: 1 % (ref 0–1)
BILIRUB SERPL-MCNC: 0.6 MG/DL (ref 0.2–1)
BILIRUB UR QL: NEGATIVE
BUN SERPL-MCNC: 12 MG/DL (ref 6–20)
BUN/CREAT SERPL: 12 (ref 12–20)
CALCIUM SERPL-MCNC: 9.5 MG/DL (ref 8.5–10.1)
CHLORIDE SERPL-SCNC: 107 MMOL/L (ref 97–108)
CHOLEST SERPL-MCNC: 181 MG/DL
CO2 SERPL-SCNC: 29 MMOL/L (ref 21–32)
COLOR UR: NORMAL
CREAT SERPL-MCNC: 1.04 MG/DL (ref 0.55–1.02)
DIFFERENTIAL METHOD BLD: NORMAL
EOSINOPHIL # BLD: 0.3 K/UL (ref 0–0.4)
EOSINOPHIL NFR BLD: 6 % (ref 0–7)
ERYTHROCYTE [DISTWIDTH] IN BLOOD BY AUTOMATED COUNT: 13.2 % (ref 11.5–14.5)
GLOBULIN SER CALC-MCNC: 2.8 G/DL (ref 2–4)
GLUCOSE SERPL-MCNC: 77 MG/DL (ref 65–100)
GLUCOSE UR STRIP.AUTO-MCNC: NEGATIVE MG/DL
HCT VFR BLD AUTO: 41.9 % (ref 35–47)
HDLC SERPL-MCNC: 65 MG/DL
HDLC SERPL: 2.8 (ref 0–5)
HGB BLD-MCNC: 13.7 G/DL (ref 11.5–16)
HGB UR QL STRIP: NEGATIVE
IMM GRANULOCYTES # BLD AUTO: 0 K/UL (ref 0–0.04)
IMM GRANULOCYTES NFR BLD AUTO: 0 % (ref 0–0.5)
KETONES UR QL STRIP.AUTO: NEGATIVE MG/DL
LDLC SERPL CALC-MCNC: 100 MG/DL (ref 0–100)
LEUKOCYTE ESTERASE UR QL STRIP.AUTO: NEGATIVE
LYMPHOCYTES # BLD: 1.8 K/UL (ref 0.8–3.5)
LYMPHOCYTES NFR BLD: 31 % (ref 12–49)
MCH RBC QN AUTO: 31 PG (ref 26–34)
MCHC RBC AUTO-ENTMCNC: 32.7 G/DL (ref 30–36.5)
MCV RBC AUTO: 94.8 FL (ref 80–99)
MONOCYTES # BLD: 0.5 K/UL (ref 0–1)
MONOCYTES NFR BLD: 8 % (ref 5–13)
NEUTS SEG # BLD: 3.1 K/UL (ref 1.8–8)
NEUTS SEG NFR BLD: 54 % (ref 32–75)
NITRITE UR QL STRIP.AUTO: NEGATIVE
NRBC # BLD: 0 K/UL (ref 0–0.01)
NRBC BLD-RTO: 0 PER 100 WBC
PH UR STRIP: 6.5 (ref 5–8)
PLATELET # BLD AUTO: 256 K/UL (ref 150–400)
PMV BLD AUTO: 10.1 FL (ref 8.9–12.9)
POTASSIUM SERPL-SCNC: 4.2 MMOL/L (ref 3.5–5.1)
PROT SERPL-MCNC: 7 G/DL (ref 6.4–8.2)
PROT UR STRIP-MCNC: NEGATIVE MG/DL
RBC # BLD AUTO: 4.42 M/UL (ref 3.8–5.2)
SODIUM SERPL-SCNC: 140 MMOL/L (ref 136–145)
SP GR UR REFRACTOMETRY: <1.005 (ref 1–1.03)
TRIGL SERPL-MCNC: 80 MG/DL
TSH SERPL DL<=0.05 MIU/L-ACNC: 1.04 UIU/ML (ref 0.36–3.74)
UROBILINOGEN UR QL STRIP.AUTO: 0.2 EU/DL (ref 0.2–1)
VLDLC SERPL CALC-MCNC: 16 MG/DL
WBC # BLD AUTO: 5.7 K/UL (ref 3.6–11)

## 2024-06-18 RX ORDER — METAXALONE 800 MG/1
800 TABLET ORAL 2 TIMES DAILY
Qty: 180 TABLET | Refills: 1 | Status: SHIPPED | OUTPATIENT
Start: 2024-06-18

## 2024-06-18 RX ORDER — FAMOTIDINE 40 MG/1
40 TABLET, FILM COATED ORAL DAILY
Qty: 90 TABLET | Refills: 1 | Status: SHIPPED | OUTPATIENT
Start: 2024-06-18

## 2024-06-18 NOTE — TELEPHONE ENCOUNTER
PCP: Uriel Vásquez APRN - NP    Last appt: 6/12/2024    Future Appointments   Date Time Provider Department Center   12/18/2024  8:30 AM Uriel Vásquez APRN - NP [facility]       Requested Prescriptions     Pending Prescriptions Disp Refills    famotidine (PEPCID) 40 MG tablet [Pharmacy Med Name: FAMOTIDINE 40 MG TABLET] 90 tablet 1     Sig: TAKE 1 TABLET BY MOUTH EVERY DAY    metaxalone (SKELAXIN) 800 MG tablet [Pharmacy Med Name: METAXALONE 800 MG TABLET] 180 tablet 1     Sig: TAKE 1 TABLET BY MOUTH TWICE A DAY

## 2024-06-24 LAB — MAMMOGRAPHY, EXTERNAL: NORMAL

## 2024-07-02 DIAGNOSIS — G89.29 OTHER CHRONIC PAIN: ICD-10-CM

## 2024-07-02 RX ORDER — BUPROPION HYDROCHLORIDE 300 MG/1
300 TABLET ORAL DAILY
Qty: 90 TABLET | Refills: 1 | Status: SHIPPED | OUTPATIENT
Start: 2024-07-02

## 2024-07-02 RX ORDER — PREGABALIN 100 MG/1
CAPSULE ORAL
Qty: 180 CAPSULE | Refills: 1 | Status: SHIPPED | OUTPATIENT
Start: 2024-07-02 | End: 2024-08-01

## 2024-07-02 NOTE — TELEPHONE ENCOUNTER
PCP: Uriel Vásquez APRN - NP    Last appt: 6/12/2024    Future Appointments   Date Time Provider Department Center   12/18/2024  8:30 AM Uriel Vásquez APRN - NP [facility]       Requested Prescriptions     Pending Prescriptions Disp Refills    buPROPion (WELLBUTRIN XL) 300 MG extended release tablet 90 tablet 1     Sig: Take 1 tablet by mouth daily

## 2024-07-02 NOTE — TELEPHONE ENCOUNTER
PCP: Uriel Vásquez APRN - NP    Last appt: Visit date not found    Future Appointments   Date Time Provider Department Center   12/18/2024  8:30 AM Uriel Vásquez APRN - NP [facility]       Requested Prescriptions     Pending Prescriptions Disp Refills    pregabalin (LYRICA) 100 MG capsule 60 capsule 0     Sig: TAKE 1 CAPSULE BY MOUTH 2 TIMES DAILY MAX DAILY AMOUNT: 200 MG

## 2024-08-13 NOTE — TELEPHONE ENCOUNTER
Last Refill: 11-29-23  Last Visit: 12-12-23   Next Visit: 6/12/2024     Requested Prescriptions     Pending Prescriptions Disp Refills    pregabalin (LYRICA) 100 MG capsule [Pharmacy Med Name: PREGABALIN 100 MG CAPSULE] 60 capsule 0     Sig: Take 1 capsule by mouth 2 times daily for 30 days. Max Daily Amount: 200 mg       Spoke to pt to confirm his appt w/ Areli Rodriguez in [facility] on 8/22/24 at 1. Advised pt to arrive 15 minutes prior.

## 2024-10-07 RX ORDER — FAMOTIDINE 40 MG/1
40 TABLET, FILM COATED ORAL DAILY
Qty: 90 TABLET | Refills: 1 | Status: SHIPPED | OUTPATIENT
Start: 2024-10-07

## 2024-10-07 RX ORDER — BUPROPION HYDROCHLORIDE 300 MG/1
300 TABLET ORAL DAILY
Qty: 90 TABLET | Refills: 1 | Status: SHIPPED | OUTPATIENT
Start: 2024-10-07

## 2024-10-07 NOTE — TELEPHONE ENCOUNTER
PCP: Uriel Vásquez APRN - NP    Last appt: 6/12/2024    Future Appointments   Date Time Provider Department Center   12/18/2024  8:30 AM Uriel Vásquez APRN - NP [facility]       Requested Prescriptions     Pending Prescriptions Disp Refills    famotidine (PEPCID) 40 MG tablet [Pharmacy Med Name: FAMOTIDINE 40 MG TABLET] 90 tablet 1     Sig: TAKE 1 TABLET BY MOUTH EVERY DAY    buPROPion (WELLBUTRIN XL) 300 MG extended release tablet [Pharmacy Med Name: BUPROPION HCL  MG TABLET] 90 tablet 1     Sig: TAKE 1 TABLET BY MOUTH EVERY DAY

## 2024-10-21 NOTE — TELEPHONE ENCOUNTER
Left message for patient to call back [facility]  OTOLARYNGOLOGY - HEAD & NECK SURGERY    10/20/2024     Reason for Consultation:   Chronic sinusitis    History of Present Illness:   Patient is a pleasant 89 year old female who is being seen for chronic sinus issues in the left side.  The patient states that she has had longstanding nasal pressure and nasal congestion on the left side, however over the last 2 weeks she found that her symptoms had worsened.  She was noted to be confused yesterday and brought to the ER.  She was found to have pyuria.  She is currently being treated on antibiotics and today she states she feels that her sinuses are much better than they were yesterday.  She has not had any previous sinus or nasal surgery.    Past Medical History  Past Medical History:    Arthritis    Asthma (HCC)    Back problem    multiple spinal injections    Cataract    bilateral cataract surgery    Cataracts, bilateral    cataracts, PC IOL 1997 OD Dr. Tate,  PC IOL Dr. Concepcion in [place], OS    Colon polyp    small polyps    Disorder of thyroid    Essential hypertension    Hearing impairment    High blood pressure    Osteoarthritis    Other and unspecified hyperlipidemia    Other ill-defined conditions(799.89)    Left wrist trauma, surgical repair    Posterior capsule opacification    OS, YAG laser 2014    Ptosis of right eyelid    Ptosis RUL    Shortness of breath    Visual impairment       Past Surgical History  Past Surgical History:   Procedure Laterality Date    Cataract      cataract extraction    Cataract Left 1997    Phaco w/PC IOL    Cataract Right 1998    Phaco w/ PC IOL    Cataract extraction w/  intraocular lens implant Left 1997    PC IOL/ Dr. Pichardo    Cataract extraction w/  intraocular lens implant Right 1998    PC IOL / Dr. Concepcion    Cholecystectomy      Colonoscopy      Ercp,diagnostic  10/16/2018    Choledocholithiasis, dilated intrahepatic and extrahepatic biliary tree    Hip replacement surgery  97/0 per NG    Hysterectomy       Partial    Laparoscopic cholecystectomy  05/27/2016    Other surgical history      Left wrist trauma, surgical repair    Other surgical history  05/27/2016    Laparoscopy with bilateral salpingo-oophorectomy    Synvisc, intra-articular Bilateral 2013    Synvisc injection bilateral knees    Total hip replacement      bilateral hip replacements    Yag capsulotomy - os - left eye Left 1998    Dr. Concepcion       Family History  Family History   Problem Relation Age of Onset    Ear Problems Mother         hearing loss    Other (Other) Father         emphysema    Diabetes Neg     Glaucoma Neg     Macular degeneration Neg        Social History  Pediatric History   Patient Parents    Not on file     Other Topics Concern     Service Not Asked    Blood Transfusions Not Asked    Caffeine Concern Yes     Comment: coffee, 1 cup daily    Occupational Exposure Not Asked    Hobby Hazards Not Asked    Sleep Concern Not Asked    Stress Concern Not Asked    Weight Concern Not Asked    Special Diet Not Asked    Back Care Not Asked    Exercise Not Asked    Bike Helmet Not Asked    Seat Belt Not Asked    Self-Exams Not Asked    Grew up on a farm No    History of tanning No    Outdoor occupation No    Pt has a pacemaker No    Pt has a defibrillator No    Breast feeding No    Reaction to local anesthetic No    Left Handed Not Asked    Right Handed Not Asked    Currently spends a great deal of time in the sun Not Asked    Past Sunlamp Treatments for Acne Not Asked    Hx of Spending Great Deal of Time in Sun Not Asked    Bad sunburns in the past Not Asked    Tanning Salons in the Past Not Asked    Hx of Radiation Treatments Not Asked    Regular use of sun block Not Asked   Social History Narrative    Not on file           Current Medications:  No current outpatient medications on file.       Allergies  Allergies[1]    Review of Systems:   A comprehensive 10 point review of systems was completed.  Pertinent positives and negatives  noted in the the HPI.    Physical Exam:   Blood pressure 128/66, pulse 75, temperature 97.5 °F (36.4 °C), temperature source Oral, resp. rate 18, height 5' 5\" (1.651 m), weight 136 lb 3.9 oz (61.8 kg), SpO2 96%, not currently breastfeeding.    GENERAL: No acute distress, Comfortable appearing  FACE: HB 1/6, Normal Animation  HEAD: Normocephalic  EYES: EOMI, pupils equil  EARS: Bilateral Auricles Symmetric  NOSE: Nares patent, and obstructed on the left due to nasal polyps  ORAL CAVITY: Tongue mobile, Oropharynx clear, Floor of mouth clear, Posterior oropharynx normal  NECK: No palpable lymphadenopathy    Results:     Laboratory Data:  Lab Results   Component Value Date    WBC 7.8 10/21/2024    HGB 10.1 (L) 10/21/2024    HCT 31.0 (L) 10/21/2024    .0 10/21/2024    CREATSERUM 0.93 10/21/2024    BUN 27 (H) 10/21/2024     (H) 10/21/2024    K 4.7 10/21/2024     (H) 10/21/2024    CO2 22.0 10/21/2024    GLU 91 10/21/2024    CA 9.0 10/21/2024    ALB 4.5 10/20/2024    ALKPHO 124 10/20/2024    TP 7.3 10/20/2024    AST 18 10/20/2024    ALT 8 (L) 10/20/2024    PTT 33.0 04/05/2024    INR 0.96 04/05/2024    PTP 13.3 04/05/2024    TSH 1.430 06/05/2023    DDIMER 0.63 01/10/2023     (H) 06/06/2016         Imaging:  CT BRAIN OR HEAD (CPT=70450)    Result Date: 10/20/2024  PROCEDURE: CT BRAIN OR HEAD (CPT=70450)  COMPARISON: [facility], CT BRAIN OR HEAD (CPT=70450), 4/05/2024, 1:52 AM.  INDICATIONS: confusion  TECHNIQUE: CT images were obtained without contrast material.  Automated exposure control for dose reduction was used.  Dose information is transmitted to the ACR (American College of Radiology) NRDR (National Radiology Data Registry) which includes the Dose Index Registry.  Impression:  No hemorrhage or mass effect or edema  Normal midline ventricles  Chronic appearing soft tissue density again seen completely filling the left maxillary sinus and eroding through the medial wall of the  left maxillary sinus into the left nasal cavity.  There is a polypoid component of the lesion which a bulges from the left nasal cavity into the left nasopharynx and which has significantly increased in size now 2.7 x 2.6 centimeter. There is new fluid/soft tissue opacification of the left sphenoid, left ethmoid, left frontal sinuses.   Finalized by (CST): Gee Sage MD on 10/20/2024 at 4:16 PM          XR CHEST AP PORTABLE  (CPT=71045)    Result Date: 10/20/2024  PROCEDURE: XR CHEST AP PORTABLE  (CPT=71045) TIME: 1507  COMPARISON: [facility], XR CHEST AP PORTABLE (CPT=71045), 4/23/2024, 7:46 AM.  INDICATIONS: confusion  TECHNIQUE:   Single view.   Impression:  Normal heart size.  No edema  No bacterial pneumonia or significant aspiration  Mild linear atelectasis at the bases  Normal pleura  No free air Finalized by (CST): Gee Sage MD on 10/20/2024 at 3:39 PM              Impression:   Chronic sinusitis, with acute exacerbation  Nasal mass  Nasal congestion    Recommendations:  I did have a long discussion with the patient regarding options for her sinus issues.  I did discuss with her that her most recent CT head compared to her last one 6 months ago does show some significant worsening of her sinusitis.  We discussed that she does have underlying nasal polyps, and that these are usually benign, but there is a small chance that this could be an inverted papilloma or sinonasal malignancy.  My suspicion is low for any sort of malignancy, and I do think that now she is feeling better with antibiotics that we can continue conservative management given her age.  I did offer nasal endoscopy with biopsy or sinus surgery, but the patient agrees that we should be conservative.  I would like to have her follow-up with me as an outpatient for nasal endoscopy in the office.  I would recommend discharge with oral antibiotics with sinus coverage, and potentially prednisone (10mg) for 5 days.    Thank you  for allowing me to participate in the care of your patient.    Mathew Mendieta, DO   Otolaryngology/Rhinology, Sinus, and Endoscopic Skull Base Surgery  [address] [place] [id number]  Phone [phone]  Fax [phone]  10/21/2024  1:06 PM  10/20/2024          [1] No Known Allergies

## 2024-10-23 RX ORDER — LISINOPRIL 2.5 MG/1
2.5 TABLET ORAL DAILY
Qty: 90 TABLET | Refills: 1 | Status: SHIPPED | OUTPATIENT
Start: 2024-10-23

## 2024-10-23 NOTE — TELEPHONE ENCOUNTER
PCP: Uriel Vásquez APRN - NP    Last appt: Visit date not found  Future Appointments   Date Time Provider Department Center   12/18/2024  8:30 AM Uriel Vásquez APRN - NP [facility] DE[facility]       Requested Prescriptions     Pending Prescriptions Disp Refills    lisinopril (PRINIVIL;ZESTRIL) 2.5 MG tablet [Pharmacy Med Name: LISINOPRIL 2.5 MG TABLET] 90 tablet 1     Sig: TAKE 1 TABLET BY MOUTH EVERY DAY       Prior labs and Blood pressures:  BP Readings from Last 3 Encounters:   06/12/24 130/78   05/29/24 (!) 141/92   12/12/23 130/70     Lab Results   Component Value Date/Time     06/12/2024 11:31 AM    K 4.2 06/12/2024 11:31 AM     06/12/2024 11:31 AM    CO2 29 06/12/2024 11:31 AM    BUN 12 06/12/2024 11:31 AM    GFRAA >60 09/13/2022 08:25 AM     No results found for: \"HBA1C\", \"[id number]\"  Lab Results   Component Value Date/Time    CHOL 181 06/12/2024 11:31 AM    HDL 65 06/12/2024 11:31 AM     06/12/2024 11:31 AM    .8 08/30/2022 11:57 AM    VLDL 16 06/12/2024 11:31 AM    VLDL 17 03/02/2021 10:42 AM     No results found for: \"VITD3\"        Lab Results   Component Value Date/Time    TSH 1.04 06/12/2024 11:31 AM

## 2024-12-18 ENCOUNTER — OFFICE VISIT (OUTPATIENT)
Facility: CLINIC | Age: 54
End: 2024-12-18
Payer: COMMERCIAL

## 2024-12-18 VITALS
WEIGHT: 233 LBS | HEIGHT: 60 IN | OXYGEN SATURATION: 98 % | BODY MASS INDEX: 45.75 KG/M2 | DIASTOLIC BLOOD PRESSURE: 83 MMHG | RESPIRATION RATE: 20 BRPM | SYSTOLIC BLOOD PRESSURE: 134 MMHG | HEART RATE: 66 BPM | TEMPERATURE: 98.1 F

## 2024-12-18 DIAGNOSIS — F41.0 EPISODIC PAROXYSMAL ANXIETY DISORDER: ICD-10-CM

## 2024-12-18 DIAGNOSIS — E78.2 MIXED HYPERLIPIDEMIA: ICD-10-CM

## 2024-12-18 DIAGNOSIS — I10 ESSENTIAL HYPERTENSION: ICD-10-CM

## 2024-12-18 DIAGNOSIS — E66.813 CLASS 3 SEVERE OBESITY DUE TO EXCESS CALORIES WITH SERIOUS COMORBIDITY AND BODY MASS INDEX (BMI) OF 40.0 TO 44.9 IN ADULT: ICD-10-CM

## 2024-12-18 DIAGNOSIS — F33.42 RECURRENT MAJOR DEPRESSIVE DISORDER, IN FULL REMISSION (HCC): Primary | ICD-10-CM

## 2024-12-18 DIAGNOSIS — E55.9 VITAMIN D DEFICIENCY: ICD-10-CM

## 2024-12-18 DIAGNOSIS — M62.838 MUSCLE SPASM: ICD-10-CM

## 2024-12-18 DIAGNOSIS — E66.01 CLASS 3 SEVERE OBESITY DUE TO EXCESS CALORIES WITH SERIOUS COMORBIDITY AND BODY MASS INDEX (BMI) OF 40.0 TO 44.9 IN ADULT: ICD-10-CM

## 2024-12-18 DIAGNOSIS — J01.00 ACUTE NON-RECURRENT MAXILLARY SINUSITIS: ICD-10-CM

## 2024-12-18 LAB
25(OH)D3 SERPL-MCNC: 48.9 NG/ML (ref 30–100)
ALBUMIN SERPL-MCNC: 4.3 G/DL (ref 3.5–5)
ALBUMIN/GLOB SERPL: 1.7 (ref 1.1–2.2)
ALP SERPL-CCNC: 151 U/L (ref 45–117)
ALT SERPL-CCNC: 23 U/L (ref 12–78)
ANION GAP SERPL CALC-SCNC: 2 MMOL/L (ref 2–12)
AST SERPL-CCNC: 13 U/L (ref 15–37)
BILIRUB SERPL-MCNC: 0.5 MG/DL (ref 0.2–1)
BUN SERPL-MCNC: 15 MG/DL (ref 6–20)
BUN/CREAT SERPL: 15 (ref 12–20)
CALCIUM SERPL-MCNC: 9.4 MG/DL (ref 8.5–10.1)
CHLORIDE SERPL-SCNC: 107 MMOL/L (ref 97–108)
CHOLEST SERPL-MCNC: 203 MG/DL
CO2 SERPL-SCNC: 29 MMOL/L (ref 21–32)
CREAT SERPL-MCNC: 0.98 MG/DL (ref 0.55–1.02)
ERYTHROCYTE [DISTWIDTH] IN BLOOD BY AUTOMATED COUNT: 13.4 % (ref 11.5–14.5)
GLOBULIN SER CALC-MCNC: 2.5 G/DL (ref 2–4)
GLUCOSE SERPL-MCNC: 86 MG/DL (ref 65–100)
HCT VFR BLD AUTO: 41.4 % (ref 35–47)
HDLC SERPL-MCNC: 58 MG/DL
HDLC SERPL: 3.5 (ref 0–5)
HGB BLD-MCNC: 13.8 G/DL (ref 11.5–16)
LDLC SERPL CALC-MCNC: 127.8 MG/DL (ref 0–100)
MCH RBC QN AUTO: 31 PG (ref 26–34)
MCHC RBC AUTO-ENTMCNC: 33.3 G/DL (ref 30–36.5)
MCV RBC AUTO: 93 FL (ref 80–99)
NRBC # BLD: 0 K/UL (ref 0–0.01)
NRBC BLD-RTO: 0 PER 100 WBC
PLATELET # BLD AUTO: 291 K/UL (ref 150–400)
PMV BLD AUTO: 10.3 FL (ref 8.9–12.9)
POTASSIUM SERPL-SCNC: 4.2 MMOL/L (ref 3.5–5.1)
PROT SERPL-MCNC: 6.8 G/DL (ref 6.4–8.2)
RBC # BLD AUTO: 4.45 M/UL (ref 3.8–5.2)
SODIUM SERPL-SCNC: 138 MMOL/L (ref 136–145)
TRIGL SERPL-MCNC: 86 MG/DL
VLDLC SERPL CALC-MCNC: 17.2 MG/DL
WBC # BLD AUTO: 6.5 K/UL (ref 3.6–11)

## 2024-12-18 PROCEDURE — 99214 OFFICE O/P EST MOD 30 MIN: CPT | Performed by: NURSE PRACTITIONER

## 2024-12-18 PROCEDURE — 3079F DIAST BP 80-89 MM HG: CPT | Performed by: NURSE PRACTITIONER

## 2024-12-18 PROCEDURE — 3075F SYST BP GE 130 - 139MM HG: CPT | Performed by: NURSE PRACTITIONER

## 2024-12-18 RX ORDER — CEFUROXIME AXETIL 500 MG/1
500 TABLET ORAL 2 TIMES DAILY
Qty: 14 TABLET | Refills: 0 | Status: SHIPPED | OUTPATIENT
Start: 2024-12-18 | End: 2024-12-25

## 2024-12-18 RX ORDER — METAXALONE 800 MG/1
800 TABLET ORAL 3 TIMES DAILY PRN
Qty: 270 TABLET | Refills: 1 | Status: SHIPPED | OUTPATIENT
Start: 2024-12-18

## 2024-12-18 RX ORDER — FLUTICASONE PROPIONATE 50 MCG
2 SPRAY, SUSPENSION (ML) NASAL DAILY
Qty: 16 G | Refills: 0 | Status: SHIPPED | OUTPATIENT
Start: 2024-12-18

## 2024-12-18 RX ORDER — BUPROPION HYDROCHLORIDE 150 MG/1
150 TABLET ORAL EVERY MORNING
Qty: 90 TABLET | Refills: 1 | Status: SHIPPED | OUTPATIENT
Start: 2024-12-18

## 2024-12-18 NOTE — PROGRESS NOTES
reduce patient's Wellbutrin XL to 150 mg daily.  Symptoms remain fairly stable.  3: Patient advised to focus on breathing exercises to florecita episodes of anxiety.  4: Continue current medications for management of hypertension.  Blood pressure appears stable and well-controlled.  5: Continue to focus on healthy lifestyle management with appropriate dietary intake.  Low-fat/low-cholesterol diet recommended with no added salt.  Maintain adequate amounts of fiber and water.  Continue regular patterns of exercise and reduce caloric intake for weight loss.  6: Continue vitamin D supplementation daily due to history of deficiency.  We will recheck this again today.  7: Patient shows to have symptoms of acute sinusitis.  Patient will be given prescription for Ceftin 500 mg twice daily x 7 days and fluticasone nasal spray daily as prescribed.  8: Patient to continue metaxalone up to 3 times daily as needed for muscle spasms and pain.  9: Continue all other medications as prescribed.  10: Patient to follow-up with me for physical exam and fasting labs in approximately 6 months, or sooner if necessary.      ATTENTION:   This medical record was transcribed using an electronic medical records system.  Although proofread, it may and can contain electronic and spelling errors.  Other human spelling and other errors may be present.  Corrections may be executed at a later time.  Please feel free to contact us for any clarifications as needed.    Signed,  Uriel Vásquez, MSN APRN FNP-BC

## 2024-12-24 RX ORDER — SPIRONOLACTONE 50 MG/1
50 TABLET, FILM COATED ORAL DAILY
Qty: 90 TABLET | Refills: 1 | Status: SHIPPED | OUTPATIENT
Start: 2024-12-24

## 2024-12-24 RX ORDER — OMEPRAZOLE 40 MG/1
CAPSULE, DELAYED RELEASE ORAL DAILY
Qty: 90 CAPSULE | Refills: 1 | Status: SHIPPED | OUTPATIENT
Start: 2024-12-24

## 2024-12-24 NOTE — TELEPHONE ENCOUNTER
PCP: Uriel Vásquez APRN - NP    Last appt: Visit date not found  Future Appointments   Date Time Provider Department Center   6/20/2025  8:30 AM Uriel Vásquez APRN - NP [facility] DE[facility]       Requested Prescriptions     Pending Prescriptions Disp Refills    omeprazole (PRILOSEC) 40 MG delayed release capsule [Pharmacy Med Name: OMEPRAZOLE DR 40 MG CAPSULE] 90 capsule 1     Sig: TAKE 1 CAPSULE BY MOUTH EVERY DAY    spironolactone (ALDACTONE) 50 MG tablet [Pharmacy Med Name: SPIRONOLACTONE 50 MG TABLET] 90 tablet 1     Sig: TAKE 1 TABLET BY MOUTH EVERY DAY       Prior labs and Blood pressures:  BP Readings from Last 3 Encounters:   12/18/24 134/83   06/12/24 130/78   05/29/24 (!) 141/92     Lab Results   Component Value Date/Time     12/18/2024 09:25 AM    K 4.2 12/18/2024 09:25 AM     12/18/2024 09:25 AM    CO2 29 12/18/2024 09:25 AM    BUN 15 12/18/2024 09:25 AM    GFRAA >60 09/13/2022 08:25 AM     No results found for: \"HBA1C\", \"PQT2WRHY\"  Lab Results   Component Value Date/Time    CHOL 203 12/18/2024 09:25 AM    HDL 58 12/18/2024 09:25 AM    .8 12/18/2024 09:25 AM    .8 08/30/2022 11:57 AM    VLDL 17.2 12/18/2024 09:25 AM    VLDL 17 03/02/2021 10:42 AM     No results found for: \"VITD3\"        Lab Results   Component Value Date/Time    TSH 1.04 06/12/2024 11:31 AM

## 2025-01-07 DIAGNOSIS — G89.29 OTHER CHRONIC PAIN: ICD-10-CM

## 2025-01-07 RX ORDER — PREGABALIN 100 MG/1
CAPSULE ORAL
Qty: 180 CAPSULE | Refills: 1 | Status: SHIPPED | OUTPATIENT
Start: 2025-01-07 | End: 2025-07-07

## 2025-01-07 NOTE — TELEPHONE ENCOUNTER
PCP: Uriel Vásquez APRN - NP    Last appt: 12/18/2024    Future Appointments   Date Time Provider Department Center   6/20/2025  8:30 AM Uriel Vásquez APRN - NP [facility] DEP       Requested Prescriptions     Pending Prescriptions Disp Refills    pregabalin (LYRICA) 100 MG capsule [Pharmacy Med Name: PREGABALIN 100 MG CAPSULE] 180 capsule      Sig: TAKE 1 CAPSULE BY MOUTH 2 TIMES DAILY MAX DAILY AMOUNT: 200 MG

## 2025-01-12 DIAGNOSIS — J01.00 ACUTE NON-RECURRENT MAXILLARY SINUSITIS: ICD-10-CM

## 2025-01-13 RX ORDER — FLUTICASONE PROPIONATE 50 MCG
2 SPRAY, SUSPENSION (ML) NASAL DAILY
Qty: 48 G | Refills: 1 | Status: SHIPPED | OUTPATIENT
Start: 2025-01-13

## 2025-01-13 NOTE — TELEPHONE ENCOUNTER
PCP: Uriel Vásquez APRN - NP    Last appt: 12/18/2024    Future Appointments   Date Time Provider Department Center   6/20/2025  8:30 AM Uriel Vásquez APRN - NP [facility] DEP       Requested Prescriptions     Pending Prescriptions Disp Refills    fluticasone (FLONASE) 50 MCG/ACT nasal spray [Pharmacy Med Name: FLUTICASONE PROP 50 MCG SPRAY]  1     Sig: SPRAY 2 SPRAYS INTO EACH NOSTRIL EVERY DAY

## 2025-02-16 ENCOUNTER — OFFICE VISIT (OUTPATIENT)
Age: 55
End: 2025-02-16

## 2025-02-16 VITALS
BODY MASS INDEX: 44.39 KG/M2 | OXYGEN SATURATION: 96 % | TEMPERATURE: 100 F | SYSTOLIC BLOOD PRESSURE: 150 MMHG | HEART RATE: 104 BPM | DIASTOLIC BLOOD PRESSURE: 94 MMHG | RESPIRATION RATE: 16 BRPM | WEIGHT: 227.3 LBS

## 2025-02-16 DIAGNOSIS — J18.9 PNEUMONIA OF LEFT LOWER LOBE DUE TO INFECTIOUS ORGANISM: ICD-10-CM

## 2025-02-16 DIAGNOSIS — B96.89 ACUTE BACTERIAL SINUSITIS: Primary | ICD-10-CM

## 2025-02-16 DIAGNOSIS — R05.1 ACUTE COUGH: ICD-10-CM

## 2025-02-16 DIAGNOSIS — J01.90 ACUTE BACTERIAL SINUSITIS: Primary | ICD-10-CM

## 2025-02-16 LAB
INFLUENZA A ANTIGEN, POC: NEGATIVE
INFLUENZA B ANTIGEN, POC: NEGATIVE

## 2025-02-16 RX ORDER — ALBUTEROL SULFATE 90 UG/1
INHALANT RESPIRATORY (INHALATION)
Qty: 18 G | Refills: 1 | Status: SHIPPED | OUTPATIENT
Start: 2025-02-16

## 2025-02-16 RX ORDER — BENZONATATE 200 MG/1
200 CAPSULE ORAL 3 TIMES DAILY PRN
Qty: 30 CAPSULE | Refills: 0 | Status: SHIPPED | OUTPATIENT
Start: 2025-02-16 | End: 2025-02-26

## 2025-02-16 RX ORDER — ESTRADIOL 0.05 MG/D
PATCH, EXTENDED RELEASE TRANSDERMAL
COMMUNITY
Start: 2024-06-24

## 2025-02-16 RX ORDER — AZITHROMYCIN 250 MG/1
TABLET, FILM COATED ORAL
Qty: 6 TABLET | Refills: 0 | Status: SHIPPED | OUTPATIENT
Start: 2025-02-16 | End: 2025-02-26

## 2025-02-24 SDOH — ECONOMIC STABILITY: FOOD INSECURITY: WITHIN THE PAST 12 MONTHS, YOU WORRIED THAT YOUR FOOD WOULD RUN OUT BEFORE YOU GOT MONEY TO BUY MORE.: NEVER TRUE

## 2025-02-24 SDOH — ECONOMIC STABILITY: TRANSPORTATION INSECURITY
IN THE PAST 12 MONTHS, HAS LACK OF TRANSPORTATION KEPT YOU FROM MEETINGS, WORK, OR FROM GETTING THINGS NEEDED FOR DAILY LIVING?: NO

## 2025-02-24 SDOH — ECONOMIC STABILITY: INCOME INSECURITY: IN THE LAST 12 MONTHS, WAS THERE A TIME WHEN YOU WERE NOT ABLE TO PAY THE MORTGAGE OR RENT ON TIME?: NO

## 2025-02-24 SDOH — ECONOMIC STABILITY: FOOD INSECURITY: WITHIN THE PAST 12 MONTHS, THE FOOD YOU BOUGHT JUST DIDN'T LAST AND YOU DIDN'T HAVE MONEY TO GET MORE.: NEVER TRUE

## 2025-02-24 SDOH — ECONOMIC STABILITY: TRANSPORTATION INSECURITY
IN THE PAST 12 MONTHS, HAS THE LACK OF TRANSPORTATION KEPT YOU FROM MEDICAL APPOINTMENTS OR FROM GETTING MEDICATIONS?: NO

## 2025-02-25 ENCOUNTER — OFFICE VISIT (OUTPATIENT)
Facility: CLINIC | Age: 55
End: 2025-02-25
Payer: COMMERCIAL

## 2025-02-25 VITALS
SYSTOLIC BLOOD PRESSURE: 126 MMHG | RESPIRATION RATE: 18 BRPM | OXYGEN SATURATION: 99 % | HEIGHT: 60 IN | BODY MASS INDEX: 44.68 KG/M2 | HEART RATE: 72 BPM | TEMPERATURE: 98.2 F | DIASTOLIC BLOOD PRESSURE: 82 MMHG | WEIGHT: 227.6 LBS

## 2025-02-25 DIAGNOSIS — R06.2 WHEEZING: ICD-10-CM

## 2025-02-25 DIAGNOSIS — R06.02 SHORTNESS OF BREATH: ICD-10-CM

## 2025-02-25 DIAGNOSIS — J18.9 PNEUMONIA DUE TO INFECTIOUS ORGANISM, UNSPECIFIED LATERALITY, UNSPECIFIED PART OF LUNG: Primary | ICD-10-CM

## 2025-02-25 PROCEDURE — 99213 OFFICE O/P EST LOW 20 MIN: CPT | Performed by: NURSE PRACTITIONER

## 2025-02-25 PROCEDURE — 3079F DIAST BP 80-89 MM HG: CPT | Performed by: NURSE PRACTITIONER

## 2025-02-25 PROCEDURE — 3074F SYST BP LT 130 MM HG: CPT | Performed by: NURSE PRACTITIONER

## 2025-02-25 RX ORDER — PREDNISONE 10 MG/1
10 TABLET ORAL SEE ADMIN INSTRUCTIONS
Qty: 1 EACH | Refills: 0 | Status: SHIPPED | OUTPATIENT
Start: 2025-02-25

## 2025-02-25 ASSESSMENT — PATIENT HEALTH QUESTIONNAIRE - PHQ9
8. MOVING OR SPEAKING SO SLOWLY THAT OTHER PEOPLE COULD HAVE NOTICED. OR THE OPPOSITE, BEING SO FIGETY OR RESTLESS THAT YOU HAVE BEEN MOVING AROUND A LOT MORE THAN USUAL: NOT AT ALL
9. THOUGHTS THAT YOU WOULD BE BETTER OFF DEAD, OR OF HURTING YOURSELF: NOT AT ALL
2. FEELING DOWN, DEPRESSED OR HOPELESS: NOT AT ALL
3. TROUBLE FALLING OR STAYING ASLEEP: NOT AT ALL
SUM OF ALL RESPONSES TO PHQ QUESTIONS 1-9: 0
10. IF YOU CHECKED OFF ANY PROBLEMS, HOW DIFFICULT HAVE THESE PROBLEMS MADE IT FOR YOU TO DO YOUR WORK, TAKE CARE OF THINGS AT HOME, OR GET ALONG WITH OTHER PEOPLE: NOT DIFFICULT AT ALL
SUM OF ALL RESPONSES TO PHQ QUESTIONS 1-9: 0
SUM OF ALL RESPONSES TO PHQ9 QUESTIONS 1 & 2: 0
1. LITTLE INTEREST OR PLEASURE IN DOING THINGS: NOT AT ALL
SUM OF ALL RESPONSES TO PHQ QUESTIONS 1-9: 0
SUM OF ALL RESPONSES TO PHQ QUESTIONS 1-9: 0
6. FEELING BAD ABOUT YOURSELF - OR THAT YOU ARE A FAILURE OR HAVE LET YOURSELF OR YOUR FAMILY DOWN: NOT AT ALL
5. POOR APPETITE OR OVEREATING: NOT AT ALL
4. FEELING TIRED OR HAVING LITTLE ENERGY: NOT AT ALL
7. TROUBLE CONCENTRATING ON THINGS, SUCH AS READING THE NEWSPAPER OR WATCHING TELEVISION: NOT AT ALL

## 2025-02-25 NOTE — PROGRESS NOTES
Frannie Larios is a 55 y.o. female     Chief Complaint   Patient presents with    Pneumonia     Seen at Urgent care        /82 (Site: Left Upper Arm, Position: Sitting, Cuff Size: Medium Adult)   Pulse 72   Temp 98.2 °F (36.8 °C) (Oral)   Resp 18   Ht 1.524 m (5')   Wt 103.2 kg (227 lb 9.6 oz)   LMP 11/01/2012   SpO2 99%   BMI 44.45 kg/m²     Health Maintenance Due   Topic Date Due    Hepatitis B vaccine (1 of 3 - 19+ 3-dose series) Never done    Flu vaccine (1) 08/01/2024    COVID-19 Vaccine (4 - 2024-25 season) 09/01/2024         \"Have you been to the ER, urgent care clinic since your last visit?  Hospitalized since your last visit?\"    YES - When: approximately 9 days ago.  Where and Why: Urgent Care for Pneumonia.    “Have you seen or consulted any other health care providers outside of [facility] since your last visit?”    NO

## 2025-02-25 NOTE — PROGRESS NOTES
Chief Complaint   Patient presents with    Pneumonia     Seen at Urgent care        SUBJECTIVE:    Frannie Larios is a 55 y.o. female who is here today with complaints of ongoing cough, wheezing, and shortness of breath after recently being treated for sinusitis and \"pneumonia.\"    The patient was seen at urgent care on 02/16/25 with complaints of sinus congestion with pressure, nasal discharge, cough, and fever.  At that time, she was given a prescription for Augmentin x 7 days, Tessalon Perles, and an albuterol inhaler.  She had a chest x-ray performed while at urgent care which apparently revealed \"pneumonia\" per radiology.  She was then sent an additional Z-Edward as well.  She states she has completed both antibiotics, but continues to have a cough, wheezing, and shortness of breath.  She denies any further fever.  She had previously been on Mucinex, but stopped taking this because her sinus symptoms appear to have resolved.  She states her cough is not productive, but seems to settle a bit when she uses her albuterol inhaler.    Current Outpatient Medications   Medication Sig Dispense Refill    predniSONE 10 MG (21) TBPK Take 10 mg by mouth See Admin Instructions 1 each 0    estradiol (VIVELLE) 0.05 MG/24HR Apply 1 patch twice a week by transdermal route.      benzonatate (TESSALON) 200 MG capsule Take 1 capsule by mouth 3 times daily as needed for Cough 30 capsule 0    albuterol sulfate HFA (PROVENTIL;VENTOLIN;PROAIR) 108 (90 Base) MCG/ACT inhaler INHALE 2 PUFFS EVERY 6 HOURS AS NEEDED FOR WHEEZE 18 g 1    fluticasone (FLONASE) 50 MCG/ACT nasal spray SPRAY 2 SPRAYS INTO EACH NOSTRIL EVERY DAY 48 g 1    pregabalin (LYRICA) 100 MG capsule TAKE 1 CAPSULE BY MOUTH 2 TIMES DAILY MAX DAILY AMOUNT: 200  capsule 1    omeprazole (PRILOSEC) 40 MG delayed release capsule TAKE 1 CAPSULE BY MOUTH EVERY DAY 90 capsule 1    spironolactone (ALDACTONE) 50 MG tablet TAKE 1 TABLET BY MOUTH EVERY DAY 90 tablet 1

## 2025-04-03 RX ORDER — LISINOPRIL 2.5 MG/1
2.5 TABLET ORAL DAILY
Qty: 90 TABLET | Refills: 1 | Status: SHIPPED | OUTPATIENT
Start: 2025-04-03

## 2025-06-20 ENCOUNTER — OFFICE VISIT (OUTPATIENT)
Facility: CLINIC | Age: 55
End: 2025-06-20
Payer: COMMERCIAL

## 2025-06-20 VITALS
SYSTOLIC BLOOD PRESSURE: 126 MMHG | BODY MASS INDEX: 45 KG/M2 | DIASTOLIC BLOOD PRESSURE: 78 MMHG | RESPIRATION RATE: 16 BRPM | TEMPERATURE: 97.4 F | HEIGHT: 60 IN | WEIGHT: 229.2 LBS | HEART RATE: 65 BPM | OXYGEN SATURATION: 100 %

## 2025-06-20 DIAGNOSIS — K21.9 GASTROESOPHAGEAL REFLUX DISEASE WITHOUT ESOPHAGITIS: ICD-10-CM

## 2025-06-20 DIAGNOSIS — E78.2 MIXED HYPERLIPIDEMIA: ICD-10-CM

## 2025-06-20 DIAGNOSIS — Z00.00 ROUTINE PHYSICAL EXAMINATION: Primary | ICD-10-CM

## 2025-06-20 DIAGNOSIS — E66.813 CLASS 3 SEVERE OBESITY DUE TO EXCESS CALORIES WITH SERIOUS COMORBIDITY AND BODY MASS INDEX (BMI) OF 40.0 TO 44.9 IN ADULT (HCC): ICD-10-CM

## 2025-06-20 DIAGNOSIS — I10 ESSENTIAL HYPERTENSION: ICD-10-CM

## 2025-06-20 PROCEDURE — 3074F SYST BP LT 130 MM HG: CPT | Performed by: NURSE PRACTITIONER

## 2025-06-20 PROCEDURE — 3078F DIAST BP <80 MM HG: CPT | Performed by: NURSE PRACTITIONER

## 2025-06-20 PROCEDURE — 99396 PREV VISIT EST AGE 40-64: CPT | Performed by: NURSE PRACTITIONER

## 2025-06-20 RX ORDER — APREMILAST 30 MG/1
TABLET, FILM COATED ORAL
COMMUNITY
Start: 2025-06-04

## 2025-06-20 NOTE — PROGRESS NOTES
Frannie Larios is a 55 y.o. female     Chief Complaint   Patient presents with    Annual Exam       /78   Pulse 65   Temp 97.4 °F (36.3 °C) (Oral)   Resp 16   Ht 1.524 m (5')   Wt 104 kg (229 lb 3.2 oz)   LMP 11/01/2012   SpO2 100%   BMI 44.76 kg/m²     Health Maintenance Due   Topic Date Due    Hepatitis B vaccine (1 of 3 - 19+ 3-dose series) Never done    COVID-19 Vaccine (4 - 2024-25 season) 09/01/2024         \"Have you been to the ER, urgent care clinic since your last visit?  Hospitalized since your last visit?\"    NO    “Have you seen or consulted any other health care providers outside of [facility] since your last visit?”    NO

## 2025-06-20 NOTE — PROGRESS NOTES
Chief Complaint   Patient presents with    Annual Exam       SUBJECTIVE:    Frannie Larios is a 55 y.o. female who is here today for a routine physical exam as well as follow up appointment regarding current medical conditions including: Essential hypertension, mixed hyperlipidemia, GERD, and morbid obesity.  She is fasting today.    The patient remains on lisinopril and spironolactone for management of her blood pressure.  Her blood pressure has remained stable and in good control overall.  She denies any adverse side effects of the medication.  She has a history of mixed hyperlipidemia on prior labs, but does not currently take medication for this.  She has been told to focus on low-fat/low-cholesterol diet.  She states she has been watchful of her diet.  She denies any recent episodes of chest pain, chest pressure, shortness of breath, headaches, dizziness, blurred vision, palpitations, or syncope episodes.      She continues to take omeprazole and famotidine daily for management of her GERD.  Her symptoms have been stable and well-controlled.    She is seeing rheumatology due to her rheumatoid arthritis.  She has been using Otezla for a few weeks now and feels that her symptoms are improving with use.    Current Outpatient Medications   Medication Sig Dispense Refill    OTEZLA 30 MG TABS       lisinopril (PRINIVIL;ZESTRIL) 2.5 MG tablet TAKE 1 TABLET BY MOUTH EVERY DAY 90 tablet 1    estradiol (VIVELLE) 0.05 MG/24HR Apply 1 patch twice a week by transdermal route.      albuterol sulfate HFA (PROVENTIL;VENTOLIN;PROAIR) 108 (90 Base) MCG/ACT inhaler INHALE 2 PUFFS EVERY 6 HOURS AS NEEDED FOR WHEEZE 18 g 1    fluticasone (FLONASE) 50 MCG/ACT nasal spray SPRAY 2 SPRAYS INTO EACH NOSTRIL EVERY DAY 48 g 1    pregabalin (LYRICA) 100 MG capsule TAKE 1 CAPSULE BY MOUTH 2 TIMES DAILY MAX DAILY AMOUNT: 200  capsule 1    omeprazole (PRILOSEC) 40 MG delayed release capsule TAKE 1 CAPSULE BY MOUTH EVERY DAY 90

## 2025-06-21 LAB
ALBUMIN SERPL-MCNC: 4.3 G/DL (ref 3.5–5)
ALBUMIN/GLOB SERPL: 1.7 (ref 1.1–2.2)
ALP SERPL-CCNC: 151 U/L (ref 45–117)
ALT SERPL-CCNC: 34 U/L (ref 12–78)
ANION GAP SERPL CALC-SCNC: 2 MMOL/L (ref 2–12)
AST SERPL-CCNC: 19 U/L (ref 15–37)
BASOPHILS # BLD: 0.05 K/UL (ref 0–0.1)
BASOPHILS NFR BLD: 0.8 % (ref 0–1)
BILIRUB SERPL-MCNC: 0.6 MG/DL (ref 0.2–1)
BUN SERPL-MCNC: 13 MG/DL (ref 6–20)
BUN/CREAT SERPL: 14 (ref 12–20)
CALCIUM SERPL-MCNC: 9.3 MG/DL (ref 8.5–10.1)
CHLORIDE SERPL-SCNC: 105 MMOL/L (ref 97–108)
CHOLEST SERPL-MCNC: 184 MG/DL
CO2 SERPL-SCNC: 27 MMOL/L (ref 21–32)
CREAT SERPL-MCNC: 0.96 MG/DL (ref 0.55–1.02)
DIFFERENTIAL METHOD BLD: NORMAL
EOSINOPHIL # BLD: 0.26 K/UL (ref 0–0.4)
EOSINOPHIL NFR BLD: 4.3 % (ref 0–7)
ERYTHROCYTE [DISTWIDTH] IN BLOOD BY AUTOMATED COUNT: 13.2 % (ref 11.5–14.5)
GLOBULIN SER CALC-MCNC: 2.5 G/DL (ref 2–4)
GLUCOSE SERPL-MCNC: 82 MG/DL (ref 65–100)
HCT VFR BLD AUTO: 43 % (ref 35–47)
HDLC SERPL-MCNC: 57 MG/DL
HDLC SERPL: 3.2 (ref 0–5)
HGB BLD-MCNC: 13.4 G/DL (ref 11.5–16)
IMM GRANULOCYTES # BLD AUTO: 0.03 K/UL (ref 0–0.04)
IMM GRANULOCYTES NFR BLD AUTO: 0.5 % (ref 0–0.5)
LDLC SERPL CALC-MCNC: 106.4 MG/DL (ref 0–100)
LYMPHOCYTES # BLD: 1.83 K/UL (ref 0.8–3.5)
LYMPHOCYTES NFR BLD: 30 % (ref 12–49)
MCH RBC QN AUTO: 29.6 PG (ref 26–34)
MCHC RBC AUTO-ENTMCNC: 31.2 G/DL (ref 30–36.5)
MCV RBC AUTO: 94.9 FL (ref 80–99)
MONOCYTES # BLD: 0.55 K/UL (ref 0–1)
MONOCYTES NFR BLD: 9 % (ref 5–13)
NEUTS SEG # BLD: 3.39 K/UL (ref 1.8–8)
NEUTS SEG NFR BLD: 55.4 % (ref 32–75)
NRBC # BLD: 0 K/UL (ref 0–0.01)
NRBC BLD-RTO: 0 PER 100 WBC
PLATELET # BLD AUTO: 223 K/UL (ref 150–400)
PMV BLD AUTO: 11 FL (ref 8.9–12.9)
POTASSIUM SERPL-SCNC: 4.3 MMOL/L (ref 3.5–5.1)
PROT SERPL-MCNC: 6.8 G/DL (ref 6.4–8.2)
RBC # BLD AUTO: 4.53 M/UL (ref 3.8–5.2)
SODIUM SERPL-SCNC: 134 MMOL/L (ref 136–145)
TRIGL SERPL-MCNC: 103 MG/DL
VLDLC SERPL CALC-MCNC: 20.6 MG/DL
WBC # BLD AUTO: 6.1 K/UL (ref 3.6–11)

## 2025-06-23 ENCOUNTER — RESULTS FOLLOW-UP (OUTPATIENT)
Facility: CLINIC | Age: 55
End: 2025-06-23

## 2025-07-04 DIAGNOSIS — F33.42 RECURRENT MAJOR DEPRESSIVE DISORDER, IN FULL REMISSION: ICD-10-CM

## 2025-07-04 DIAGNOSIS — F41.0 EPISODIC PAROXYSMAL ANXIETY DISORDER: ICD-10-CM

## 2025-07-07 RX ORDER — SPIRONOLACTONE 50 MG/1
50 TABLET, FILM COATED ORAL DAILY
Qty: 90 TABLET | Refills: 1 | Status: SHIPPED | OUTPATIENT
Start: 2025-07-07

## 2025-07-07 RX ORDER — OMEPRAZOLE 40 MG/1
CAPSULE, DELAYED RELEASE ORAL DAILY
Qty: 90 CAPSULE | Refills: 1 | Status: SHIPPED | OUTPATIENT
Start: 2025-07-07

## 2025-07-07 RX ORDER — BUPROPION HYDROCHLORIDE 150 MG/1
150 TABLET ORAL EVERY MORNING
Qty: 90 TABLET | Refills: 1 | Status: SHIPPED | OUTPATIENT
Start: 2025-07-07

## 2025-07-07 RX ORDER — FAMOTIDINE 40 MG/1
40 TABLET, FILM COATED ORAL DAILY
Qty: 90 TABLET | Refills: 1 | Status: SHIPPED | OUTPATIENT
Start: 2025-07-07

## 2025-07-07 NOTE — TELEPHONE ENCOUNTER
PCP: Uriel Vásquez APRN - NP    Last appt: 6/20/2025    Future Appointments   Date Time Provider Department Center   1/8/2026  8:00 AM [facility]       Requested Prescriptions     Pending Prescriptions Disp Refills    omeprazole (PRILOSEC) 40 MG delayed release capsule [Pharmacy Med Name: OMEPRAZOLE DR 40 MG CAPSULE] 90 capsule 1     Sig: TAKE 1 CAPSULE BY MOUTH EVERY DAY    famotidine (PEPCID) 40 MG tablet [Pharmacy Med Name: FAMOTIDINE 40 MG TABLET] 90 tablet 1     Sig: TAKE 1 TABLET BY MOUTH EVERY DAY    buPROPion (WELLBUTRIN XL) 150 MG extended release tablet [Pharmacy Med Name: BUPROPION HCL  MG TABLET] 90 tablet 1     Sig: TAKE 1 TABLET BY MOUTH EVERY DAY IN THE MORNING    spironolactone (ALDACTONE) 50 MG tablet [Pharmacy Med Name: SPIRONOLACTONE 50 MG TABLET] 90 tablet 1     Sig: TAKE 1 TABLET BY MOUTH EVERY DAY

## 2025-07-08 DIAGNOSIS — G89.29 OTHER CHRONIC PAIN: ICD-10-CM

## 2025-07-08 RX ORDER — PREGABALIN 100 MG/1
CAPSULE ORAL
Qty: 180 CAPSULE | Refills: 0 | Status: SHIPPED | OUTPATIENT
Start: 2025-07-08 | End: 2025-10-08

## 2025-07-08 NOTE — TELEPHONE ENCOUNTER
PCP: Uriel Vásquez APRN - NP    Last appt: 6/20/2025    Future Appointments   Date Time Provider Department Center   1/8/2026  8:00 AM Uriel Vásquez APRN - NP [facility]       Requested Prescriptions     Pending Prescriptions Disp Refills    pregabalin (LYRICA) 100 MG capsule [Pharmacy Med Name: PREGABALIN 100 MG CAPSULE] 180 capsule 0     Sig: TAKE 1 CAPSULE BY MOUTH 2 TIMES DAILY MAX DAILY AMOUNT: 200 MG

## 2025-07-09 NOTE — TELEPHONE ENCOUNTER
PCP: rUiel Vásquez APRN - NP    Last appt: 6/20/2025    Future Appointments   Date Time Provider Department Center   1/8/2026  8:00 AM Uriel Vásquez APRN - NP [facility]       Requested Prescriptions     Pending Prescriptions Disp Refills    lisinopril (PRINIVIL;ZESTRIL) 2.5 MG tablet [Pharmacy Med Name: LISINOPRIL 2.5 MG TABLET] 90 tablet 1     Sig: TAKE 1 TABLET BY MOUTH EVERY DAY

## 2025-07-10 RX ORDER — LISINOPRIL 2.5 MG/1
2.5 TABLET ORAL DAILY
Qty: 90 TABLET | Refills: 1 | Status: SHIPPED | OUTPATIENT
Start: 2025-07-10

## 2025-07-20 DIAGNOSIS — J01.00 ACUTE NON-RECURRENT MAXILLARY SINUSITIS: ICD-10-CM

## 2025-07-21 RX ORDER — FLUTICASONE PROPIONATE 50 MCG
2 SPRAY, SUSPENSION (ML) NASAL DAILY
Qty: 48 ML | Refills: 1 | Status: SHIPPED | OUTPATIENT
Start: 2025-07-21

## 2025-07-21 NOTE — TELEPHONE ENCOUNTER
PCP: Uriel Vásquez APRN - NP    Last appt: 6/20/2025    Future Appointments   Date Time Provider Department Center   1/8/2026  8:00 AM Uriel Vásquez APRN - NP [facility] DE[facility]       Requested Prescriptions     Pending Prescriptions Disp Refills    fluticasone (FLONASE) 50 MCG/ACT nasal spray [Pharmacy Med Name: FLUTICASONE PROP 50 MCG SPRAY] 48 mL 1     Sig: SPRAY 2 SPRAYS INTO EACH NOSTRIL EVERY DAY